# Patient Record
Sex: FEMALE | Race: WHITE | NOT HISPANIC OR LATINO | Employment: OTHER | URBAN - METROPOLITAN AREA
[De-identification: names, ages, dates, MRNs, and addresses within clinical notes are randomized per-mention and may not be internally consistent; named-entity substitution may affect disease eponyms.]

---

## 2018-01-13 NOTE — RESULT NOTES
Message   Recorded as Task   Date: 01/18/2016 09:42 AM, Created By: Major Guzman   Task Name: Follow Up   Assigned To: [facility] procedure,Team   Regarding Patient: Vincent Michelle, Status:  In Progress   Comment:    Lakia Holder - 18 Jan 2016 9:42 AM     TASK CREATED  S/p LESI on 1/11/16 w/Dr Irma Patel in [place]  No f/u scheduled   Lakia Holder - 20 Jan 2016 1:07 PM     TASK EDITED  S/w pt for f/u after injection  -pain is mostly gone, very happy with results  -she did mention that her blood pressure was up a little the day of & day after, also she had some swelling in her ankles, but she states that her  also had his ankles swelling?? So, she doesn't think it is related  -no f/u scheduled but will call if pain returns or if she has questions   Carlos Mckeon - 20 Jan 2016 1:44 PM     TASK REPLIED TO: Previously Assigned To Carlos Mckeon  [place]

## 2018-01-17 NOTE — RESULT NOTES
Message   Recorded as Task   Date: 01/18/2016 09:42 AM, Created By: Luis Pittman   Task Name: Follow Up   Assigned To: [facility] procedure,Team   Regarding Patient: Richmond Gregory, Status:  In Progress   Comment:    Lakia Holder - 18 Jan 2016 9:42 AM     TASK CREATED  S/p LESI on 1/11/16 w/Dr Patricio Rodriguez in [facility]  No f/u scheduled   Lakia Holder - 20 Jan 2016 1:07 PM     TASK EDITED  S/w pt for f/u after injection  -pain is mostly gone, very happy with results  -she did mention that her blood pressure was up a little the day of & day after, also she had some swelling in her ankles, but she states that her  also had his ankles swelling?? So, she doesn't think it is related  -no f/u scheduled but will call if pain returns or if she has questions   Carlos Mckeon - 20 Jan 2016 1:44 PM     TASK REPLIED TO: Previously Assigned To Carlos Mckeon  [place]

## 2019-02-15 ENCOUNTER — TELEPHONE (OUTPATIENT)
Dept: PAIN MEDICINE | Facility: CLINIC | Age: 84
End: 2019-02-15

## 2019-02-15 NOTE — TELEPHONE ENCOUNTER
S/w pt states she is a pt of [facility] and rec'd injections in the past; has herniated discs and is in pain  Pt was last seen 1/2016 and requests to seen [facility]  Pt has not seen any pm doctors other than [facility] in 2016

## 2019-02-26 ENCOUNTER — OFFICE VISIT (OUTPATIENT)
Dept: PAIN MEDICINE | Facility: CLINIC | Age: 84
End: 2019-02-26
Payer: MEDICARE

## 2019-02-26 VITALS
WEIGHT: 169 LBS | BODY MASS INDEX: 28.16 KG/M2 | HEIGHT: 65 IN | HEART RATE: 76 BPM | DIASTOLIC BLOOD PRESSURE: 70 MMHG | SYSTOLIC BLOOD PRESSURE: 140 MMHG

## 2019-02-26 DIAGNOSIS — M48.062 SPINAL STENOSIS OF LUMBAR REGION WITH NEUROGENIC CLAUDICATION: Primary | ICD-10-CM

## 2019-02-26 DIAGNOSIS — M54.16 LUMBAR RADICULOPATHY: ICD-10-CM

## 2019-02-26 PROCEDURE — 99204 OFFICE O/P NEW MOD 45 MIN: CPT | Performed by: ANESTHESIOLOGY

## 2019-02-26 RX ORDER — IBUPROFEN 200 MG
TABLET ORAL
COMMUNITY
End: 2019-09-25 | Stop reason: HOSPADM

## 2019-02-26 RX ORDER — SIMVASTATIN 10 MG
10 TABLET ORAL
COMMUNITY

## 2019-02-26 RX ORDER — AMITRIPTYLINE HYDROCHLORIDE 25 MG/1
25 TABLET, FILM COATED ORAL
COMMUNITY

## 2019-02-26 RX ORDER — AMLODIPINE BESYLATE 2.5 MG/1
2.5 TABLET ORAL DAILY
Refills: 3 | COMMUNITY
Start: 2019-02-19

## 2019-02-26 NOTE — PROGRESS NOTES
Assessment:  1  Spinal stenosis of lumbar region with neurogenic claudication    2  Lumbar radiculopathy        Plan:  The patient's pain persists despite time, relative rest, activity modification and therapy  I believe that she would benefit from a lumbar epidural steroid injection to diminish any inflammatory component of her pain  I will initially use an translaminar approach  If the patient does not receive significant pain relief following the initial injection, I may need to repeat using a transforaminal approach that may allow for better concentrate of the steroid along the affected nerve root  In the office today, we reviewed the nature of the patient's pathology in depth using  diagrams and models  I discussed the approach I would use for the epidural steroid injection and provided literature for home review  The patient understands the risks associated with the procedure including but not limited to bleeding, infection, tissue injury, exacerbation of symptoms, allergic reaction, spinal headache, and paralysis and provided written and verbal consent  today  My impressions and treatment recommendations were discussed in detail with the patient who verbalized understanding and had no further questions  Discharge instructions were provided  I personally saw and examined the patient and I agree with the above discussed plan of care  Orders Placed This Encounter   Procedures    [place] spine and pain procedure     Standing Status:   Future     Standing Expiration Date:   2/26/2023     Order Specific Question:   Reason for Exam:     Answer:   LESI #1     Order Specific Question:   Anticoagulant hold needed? Answer:   no    [place] spine and pain procedure     Standing Status:   Future     Standing Expiration Date:   2/26/2023     Order Specific Question:   Reason for Exam:     Answer:   LESI #2     Order Specific Question:   Anticoagulant hold needed?      Answer:   no     New Medications Ordered This Visit   Medications    ibuprofen (ADVIL) 200 mg tablet     Sig: Take by mouth    amLODIPine (NORVASC) 2 5 mg tablet     Refill:  3    simvastatin (ZOCOR) 10 mg tablet     Sig: Take 10 mg by mouth daily at bedtime    amitriptyline (ELAVIL) 25 mg tablet     Sig: Take 25 mg by mouth daily at bedtime       History of Present Illness:    Justin Bernabe is a 80 y o  female muscle over three years ago  At that point she underwent a lumbar epidural steroid injection with significant relief her pain returned approximately six months ago which described as moderate rates as 5/10 on the visual analog scale but significant interferes with daily living activities  Her pain is nearly constant worse in the morning describes dull achy noting that standing and walking aggravate her symptoms rest relaxation relieves them  She has undergone chiropractic treatment utilize heat nice with moderate relief  I have personally reviewed and/or updated the patient's past medical history, past surgical history, family history, social history, current medications, allergies, and vital signs today  Review of Systems:    Review of Systems   Constitutional: Negative for fever and unexpected weight change  HENT: Negative for trouble swallowing  Eyes: Negative for visual disturbance  Respiratory: Negative for shortness of breath and wheezing  Cardiovascular: Positive for palpitations  Negative for chest pain  Gastrointestinal: Positive for constipation  Negative for diarrhea, nausea and vomiting  Endocrine: Negative for cold intolerance, heat intolerance and polydipsia  Genitourinary: Negative for difficulty urinating and frequency  Musculoskeletal: Positive for gait problem (difficulty walking, Decreased ROM) and joint swelling (Joint stiffness)  Negative for arthralgias and myalgias  Skin: Positive for rash  Neurological: Positive for dizziness and weakness  Negative for seizures, syncope and headaches  Hematological: Bruises/bleeds easily  Psychiatric/Behavioral: Negative for dysphoric mood  All other systems reviewed and are negative  Patient Active Problem List   Diagnosis    Chronic low back pain    Chronic lumbar radiculopathy    Idiopathic peripheral neuropathy    Lumbar herniated disc    PTTD (posterior tibial tendon dysfunction)    Spinal stenosis of lumbar region with neurogenic claudication       Past Medical History:   Diagnosis Date    Asthma     Atrial fibrillation (HCC)     Fibromyalgia     GERD (gastroesophageal reflux disease)     H/O emphysema     Hypercholesterolemia        Past Surgical History:   Procedure Laterality Date    CHOLECYSTECTOMY      HYSTERECTOMY         Family History   Problem Relation Age of Onset    Neuropathy Other        Social History     Occupational History    Not on file   Tobacco Use    Smoking status: Never Smoker    Smokeless tobacco: Never Used   Substance and Sexual Activity    Alcohol use: Not on file    Drug use: Not on file    Sexual activity: Not on file     Physical Exam:    /70   Pulse 76   Ht 5' 5" (1 651 m)   Wt 76 7 kg (169 lb)   BMI 28 12 kg/m²     Constitutional: normal, well developed, well nourished, alert, in no distress and non-toxic and no overt pain behavior    Eyes: anicteric  HEENT: grossly intact  Neck: supple, symmetric, trachea midline and no masses   Pulmonary:even and unlabored  Cardiovascular:No edema or pitting edema present  Skin:Normal without rashes or lesions and well hydrated  Psychiatric:Mood and affect appropriate  Neurologic:Cranial Nerves II-XII grossly intact  Musculoskeletal:normal, difficulty going from sitting to standing sitting position no obvious skin lesions or erythema lumbar sacral spine no tenderness to palpation lumbar sacral spine spinous process sacroiliac joint or greater trochanter bilateral, deep tendon reflexes diminished but symmetrical bilateral patella Achilles no focal motor deficit appreciated lower limbs no sensory deficit appreciated lower limbs    Imaging  CT LUMBAR SP W/O IV CONTRAST Date of Service: 01/23/14   Category: Cat Scan     REPORT STATUS: Signed       CT lumbar spine     History: Sciatica     Multi-helical imaging in the axial plane with coronal and sagittal reformations  Compared with   April 17, 2008 plain film examination  There is chronically stable mild superior endplate compression deformity of T12  There are no destructive bony lesions  L1-L2 reveals minimal disc bulge and is otherwise unremarkable  No canal stenosis  No focal disc   herniation  L2-L3 reveals moderate disc bulge with suggested small superimposed broad-based right lateral   herniation with no significant mass effect  There is mild facet and ligamentum hypertrophy  Overall   canal dimensions are preserved without canal stenosis  L3-L4 reveals mild disc bulge with suggested small broad-based right lateral herniation without   significant mass effect  No canal stenosis  L4-L5 reveals mild disc bulge facet and ligamentum hypertrophy with mild central canal stenosis  There is suggested small right lateral broad-based herniation without significant mass effect  L5-S1 reveals no disc bulge or herniation  No canal stenosis  Impression:   1  Small broad-based right lateral disc herniations are suggested at L2-L3 and L3-L4 and L4-L5   without significant mass effect  2  No canal stenosis  No significant foraminal stenosis  3  Chronic stable mild T12 compression  I have personally reviewed pertinent films in PACS

## 2019-02-28 ENCOUNTER — HOSPITAL ENCOUNTER (OUTPATIENT)
Dept: RADIOLOGY | Facility: CLINIC | Age: 84
Discharge: HOME | End: 2019-02-28
Attending: ANESTHESIOLOGY | Admitting: ANESTHESIOLOGY
Payer: MEDICARE

## 2019-02-28 VITALS
DIASTOLIC BLOOD PRESSURE: 63 MMHG | RESPIRATION RATE: 18 BRPM | HEART RATE: 60 BPM | SYSTOLIC BLOOD PRESSURE: 175 MMHG | TEMPERATURE: 98.5 F | OXYGEN SATURATION: 96 %

## 2019-02-28 DIAGNOSIS — M48.062 SPINAL STENOSIS OF LUMBAR REGION WITH NEUROGENIC CLAUDICATION: ICD-10-CM

## 2019-02-28 DIAGNOSIS — M54.16 LUMBAR RADICULOPATHY: ICD-10-CM

## 2019-02-28 PROCEDURE — 62323 NJX INTERLAMINAR LMBR/SAC: CPT | Performed by: ANESTHESIOLOGY

## 2019-02-28 RX ORDER — METHYLPREDNISOLONE ACETATE 80 MG/ML
160 INJECTION, SUSPENSION INTRA-ARTICULAR; INTRALESIONAL; INTRAMUSCULAR; PARENTERAL; SOFT TISSUE ONCE
Status: COMPLETED | OUTPATIENT
Start: 2019-02-28 | End: 2019-02-28

## 2019-02-28 RX ORDER — LIDOCAINE HYDROCHLORIDE 10 MG/ML
5 INJECTION, SOLUTION EPIDURAL; INFILTRATION; INTRACAUDAL; PERINEURAL ONCE
Status: COMPLETED | OUTPATIENT
Start: 2019-02-28 | End: 2019-02-28

## 2019-02-28 RX ADMIN — LIDOCAINE HYDROCHLORIDE 3 ML: 10 INJECTION, SOLUTION EPIDURAL; INFILTRATION; INTRACAUDAL; PERINEURAL at 10:20

## 2019-02-28 RX ADMIN — METHYLPREDNISOLONE ACETATE 160 MG: 80 INJECTION, SUSPENSION INTRA-ARTICULAR; INTRALESIONAL; INTRAMUSCULAR; SOFT TISSUE at 10:20

## 2019-02-28 RX ADMIN — IOHEXOL 1 ML: 300 INJECTION, SOLUTION INTRAVENOUS at 10:20

## 2019-02-28 NOTE — DISCHARGE INSTRUCTIONS
Epidural Steroid Injection   WHAT YOU NEED TO KNOW:   An epidural steroid injection (MELANI) is a procedure to inject steroid medicine into the epidural space  The epidural space is between your spinal cord and vertebrae  Steroids reduce inflammation and fluid buildup in your spine that may be causing pain  You may be given pain medicine along with the steroids  ACTIVITY  · Do not drive or operate machinery today  · No strenuous activity today - bending, lifting, etc   · You may resume normal activites starting tomorrow - start slowly and as tolerated  · You may shower today, but no tub baths or hot tubs  · You may have numbness for several hours from the local anesthetic  Please use caution and common sense, especially with weight-bearing activities  CARE OF THE INJECTION SITE  · If you have soreness or pain, apply ice to the area today (20 minutes on/20 minutes off)  · Starting tomorrow, you may use warm, moist heat or ice if needed  · You may have an increase or change in your discomfort for 36-48 hours after your treatment  · Apply ice and continue with any pain medication you have been prescribed  · Notify [facility] if you have any of the following: redness, drainage, swelling, headache, stiff neck or fever above 100°F     SPECIAL INSTRUCTIONS  · Our office will contact you in approximately 7 days for a progress report  MEDICATIONS  · Continue to take all routine medications  · Our office may have instructed you to hold some medications  · Ok to resume ibuprofen    If you have a problem specifically related to your procedure, please call our office at [phone]  Problems not related to your procedure should be directed to your primary care physician

## 2019-02-28 NOTE — H&P
History of Present Illness: The patient is a 80 y o  female who presents with complaints of low back and lower extremity pain  Patient Active Problem List   Diagnosis    Chronic low back pain    Chronic lumbar radiculopathy    Idiopathic peripheral neuropathy    Lumbar herniated disc    PTTD (posterior tibial tendon dysfunction)    Spinal stenosis of lumbar region with neurogenic claudication       Past Medical History:   Diagnosis Date    Asthma     Atrial fibrillation (HCC)     Fibromyalgia     GERD (gastroesophageal reflux disease)     H/O emphysema     Hypercholesterolemia        Past Surgical History:   Procedure Laterality Date    CHOLECYSTECTOMY      HYSTERECTOMY           Current Outpatient Medications:     amitriptyline (ELAVIL) 25 mg tablet, Take 25 mg by mouth daily at bedtime, Disp: , Rfl:     amLODIPine (NORVASC) 2 5 mg tablet, , Disp: , Rfl: 3    ibuprofen (ADVIL) 200 mg tablet, Take by mouth, Disp: , Rfl:     simvastatin (ZOCOR) 10 mg tablet, Take 10 mg by mouth daily at bedtime, Disp: , Rfl:     Current Facility-Administered Medications:     iohexol (OMNIPAQUE) 300 mg/mL injection 50 mL, 50 mL, Epidural, Once, Carlos Mckeon DO    lidocaine (PF) (XYLOCAINE-MPF) 1 % injection 5 mL, 5 mL, Other, Once, Carlos Mckeon DO    methylPREDNISolone acetate (DEPO-MEDROL) injection 160 mg, 160 mg, Epidural, Once, Darol Medin, DO    Allergies   Allergen Reactions    Ciprofloxacin     Penicillins      Other reaction(s): Itching       Physical Exam:   Vitals:    02/28/19 1011   BP: 163/83   Pulse: 63   Resp: 18   Temp: 98 5 °F (36 9 °C)   SpO2: 95%     General: Awake, Alert, Oriented x 3, Mood and affect appropriate  Respiratory: Respirations even and unlabored  Cardiovascular: Peripheral pulses intact; no edema  Musculoskeletal Exam:  Decreased range of motion lumbar spine    ASA Score: II    Patient/Chart Verification  Patient ID Verified: Verbal  ID Band Applied: No  Consents Confirmed:  To be obtained in the Pre-Procedure area  H&P( within 30 days) Verified: To be obtained in the Pre-Procedure area  Interval H&P(within 24 hr) Complete (required for Outpatients and Surgery Admit only): To be obtained in the Pre-Procedure area  Allergies Reviewed: Yes  Anticoag/NSAID held?: Yes  Currently on antibiotics?: No  Pregnancy denied?: NA  Beta Blocker given : N/A    Assessment:   1  Spinal stenosis of lumbar region with neurogenic claudication    2   Lumbar radiculopathy        Plan: PENELOPE #1

## 2019-03-07 ENCOUNTER — TELEPHONE (OUTPATIENT)
Dept: PAIN MEDICINE | Facility: CLINIC | Age: 84
End: 2019-03-07

## 2019-03-14 ENCOUNTER — TELEPHONE (OUTPATIENT)
Dept: PAIN MEDICINE | Facility: MEDICAL CENTER | Age: 84
End: 2019-03-14

## 2019-09-19 ENCOUNTER — APPOINTMENT (OUTPATIENT)
Dept: RADIOLOGY | Facility: HOSPITAL | Age: 84
DRG: 023 | End: 2019-09-19
Payer: MEDICARE

## 2019-09-19 ENCOUNTER — APPOINTMENT (INPATIENT)
Dept: NON INVASIVE DIAGNOSTICS | Facility: HOSPITAL | Age: 84
DRG: 023 | End: 2019-09-19
Payer: MEDICARE

## 2019-09-19 ENCOUNTER — HOSPITAL ENCOUNTER (OUTPATIENT)
Dept: RADIOLOGY | Facility: HOSPITAL | Age: 84
Discharge: HOME | End: 2019-09-19

## 2019-09-19 ENCOUNTER — DOCUMENTATION (OUTPATIENT)
Dept: OTHER | Facility: HOSPITAL | Age: 84
End: 2019-09-19

## 2019-09-19 ENCOUNTER — ANESTHESIA EVENT (OUTPATIENT)
Dept: RADIOLOGY | Facility: HOSPITAL | Age: 84
DRG: 023 | End: 2019-09-19
Payer: MEDICARE

## 2019-09-19 ENCOUNTER — ANESTHESIA (OUTPATIENT)
Dept: RADIOLOGY | Facility: HOSPITAL | Age: 84
DRG: 023 | End: 2019-09-19
Payer: MEDICARE

## 2019-09-19 ENCOUNTER — HOSPITAL ENCOUNTER (INPATIENT)
Dept: RADIOLOGY | Facility: HOSPITAL | Age: 84
LOS: 6 days | DRG: 023 | End: 2019-09-25
Attending: RADIOLOGY | Admitting: INTERNAL MEDICINE
Payer: MEDICARE

## 2019-09-19 DIAGNOSIS — K59.00 CONSTIPATION: ICD-10-CM

## 2019-09-19 DIAGNOSIS — I48.91 ATRIAL FIBRILLATION (HCC): Chronic | ICD-10-CM

## 2019-09-19 DIAGNOSIS — B37.9 CANDIDIASIS: ICD-10-CM

## 2019-09-19 DIAGNOSIS — M54.16 CHRONIC LUMBAR RADICULOPATHY: ICD-10-CM

## 2019-09-19 DIAGNOSIS — I63.429 CEREBROVASCULAR ACCIDENT (CVA) DUE TO EMBOLISM OF ANTERIOR CEREBRAL ARTERY, UNSPECIFIED BLOOD VESSEL LATERALITY (HCC): ICD-10-CM

## 2019-09-19 DIAGNOSIS — I48.91 ATRIAL FIBRILLATION WITH RVR (HCC): Primary | ICD-10-CM

## 2019-09-19 DIAGNOSIS — I63.531 ACUTE RIGHT PCA STROKE (HCC): ICD-10-CM

## 2019-09-19 LAB
ANION GAP SERPL CALCULATED.3IONS-SCNC: 6 MMOL/L (ref 4–13)
BASE EXCESS BLDA CALC-SCNC: -8 MMOL/L (ref -2–3)
BASOPHILS # BLD AUTO: 0.05 THOUSANDS/ΜL (ref 0–0.1)
BASOPHILS NFR BLD AUTO: 0 % (ref 0–1)
BUN SERPL-MCNC: 17 MG/DL (ref 5–25)
CA-I BLD-SCNC: 1.01 MMOL/L (ref 1.12–1.32)
CA-I BLD-SCNC: 1.06 MMOL/L (ref 1.12–1.32)
CALCIUM SERPL-MCNC: 7.1 MG/DL (ref 8.3–10.1)
CHLORIDE SERPL-SCNC: 114 MMOL/L (ref 100–108)
CO2 SERPL-SCNC: 19 MMOL/L (ref 21–32)
CREAT SERPL-MCNC: 0.61 MG/DL (ref 0.6–1.3)
EOSINOPHIL # BLD AUTO: 0 THOUSAND/ΜL (ref 0–0.61)
EOSINOPHIL NFR BLD AUTO: 0 % (ref 0–6)
ERYTHROCYTE [DISTWIDTH] IN BLOOD BY AUTOMATED COUNT: 14.5 % (ref 11.6–15.1)
GFR SERPL CREATININE-BSD FRML MDRD: 83 ML/MIN/1.73SQ M
GLUCOSE SERPL-MCNC: 124 MG/DL (ref 65–140)
GLUCOSE SERPL-MCNC: 136 MG/DL (ref 65–140)
HCO3 BLDA-SCNC: 18.2 MMOL/L (ref 22–28)
HCT VFR BLD AUTO: 36 % (ref 34.8–46.1)
HCT VFR BLD CALC: 32 % (ref 34.8–46.1)
HGB BLD-MCNC: 11.4 G/DL (ref 11.5–15.4)
HGB BLDA-MCNC: 10.9 G/DL (ref 11.5–15.4)
IMM GRANULOCYTES # BLD AUTO: 0.07 THOUSAND/UL (ref 0–0.2)
IMM GRANULOCYTES NFR BLD AUTO: 1 % (ref 0–2)
LYMPHOCYTES # BLD AUTO: 1.22 THOUSANDS/ΜL (ref 0.6–4.47)
LYMPHOCYTES NFR BLD AUTO: 9 % (ref 14–44)
MAGNESIUM SERPL-MCNC: 1.8 MG/DL (ref 1.6–2.6)
MCH RBC QN AUTO: 27.4 PG (ref 26.8–34.3)
MCHC RBC AUTO-ENTMCNC: 31.7 G/DL (ref 31.4–37.4)
MCV RBC AUTO: 87 FL (ref 82–98)
MONOCYTES # BLD AUTO: 1.01 THOUSAND/ΜL (ref 0.17–1.22)
MONOCYTES NFR BLD AUTO: 8 % (ref 4–12)
NEUTROPHILS # BLD AUTO: 11.07 THOUSANDS/ΜL (ref 1.85–7.62)
NEUTS SEG NFR BLD AUTO: 82 % (ref 43–75)
NRBC BLD AUTO-RTO: 0 /100 WBCS
PCO2 BLD: 19 MMOL/L (ref 21–32)
PCO2 BLD: 37.8 MM HG (ref 36–44)
PH BLD: 7.29 [PH] (ref 7.35–7.45)
PHOSPHATE SERPL-MCNC: 2.6 MG/DL (ref 2.3–4.1)
PLATELET # BLD AUTO: 340 THOUSANDS/UL (ref 149–390)
PMV BLD AUTO: 10.6 FL (ref 8.9–12.7)
PO2 BLD: 366 MM HG (ref 75–129)
POTASSIUM BLD-SCNC: 3.5 MMOL/L (ref 3.5–5.3)
POTASSIUM SERPL-SCNC: 3.4 MMOL/L (ref 3.5–5.3)
RBC # BLD AUTO: 4.16 MILLION/UL (ref 3.81–5.12)
SAO2 % BLD FROM PO2: 100 % (ref 95–98)
SODIUM BLD-SCNC: 140 MMOL/L (ref 136–145)
SODIUM SERPL-SCNC: 139 MMOL/L (ref 136–145)
SPECIMEN SOURCE: ABNORMAL
WBC # BLD AUTO: 13.42 THOUSAND/UL (ref 4.31–10.16)

## 2019-09-19 PROCEDURE — 03CG3Z7 EXTIRPATION OF MATTER FROM INTRACRANIAL ARTERY USING STENT RETRIEVER, PERCUTANEOUS APPROACH: ICD-10-PCS | Performed by: RADIOLOGY

## 2019-09-19 PROCEDURE — C1769 GUIDE WIRE: HCPCS

## 2019-09-19 PROCEDURE — C1894 INTRO/SHEATH, NON-LASER: HCPCS

## 2019-09-19 PROCEDURE — 94760 N-INVAS EAR/PLS OXIMETRY 1: CPT

## 2019-09-19 PROCEDURE — 85014 HEMATOCRIT: CPT

## 2019-09-19 PROCEDURE — 0042T HB CT PERFUSION W/CONTRAST CBF: CPT

## 2019-09-19 PROCEDURE — 85025 COMPLETE CBC W/AUTO DIFF WBC: CPT | Performed by: FAMILY MEDICINE

## 2019-09-19 PROCEDURE — 99223 1ST HOSP IP/OBS HIGH 75: CPT | Performed by: INTERNAL MEDICINE

## 2019-09-19 PROCEDURE — 93306 TTE W/DOPPLER COMPLETE: CPT | Performed by: INTERNAL MEDICINE

## 2019-09-19 PROCEDURE — 82330 ASSAY OF CALCIUM: CPT

## 2019-09-19 PROCEDURE — 5A1935Z RESPIRATORY VENTILATION, LESS THAN 24 CONSECUTIVE HOURS: ICD-10-PCS | Performed by: INTERNAL MEDICINE

## 2019-09-19 PROCEDURE — 80048 BASIC METABOLIC PNL TOTAL CA: CPT | Performed by: FAMILY MEDICINE

## 2019-09-19 PROCEDURE — 82947 ASSAY GLUCOSE BLOOD QUANT: CPT

## 2019-09-19 PROCEDURE — 83735 ASSAY OF MAGNESIUM: CPT | Performed by: FAMILY MEDICINE

## 2019-09-19 PROCEDURE — 61645 PERQ ART M-THROMBECT &/NFS: CPT

## 2019-09-19 PROCEDURE — 99223 1ST HOSP IP/OBS HIGH 75: CPT | Performed by: RADIOLOGY

## 2019-09-19 PROCEDURE — 93005 ELECTROCARDIOGRAM TRACING: CPT

## 2019-09-19 PROCEDURE — 61645 PERQ ART M-THROMBECT &/NFS: CPT | Performed by: RADIOLOGY

## 2019-09-19 PROCEDURE — C1757 CATH, THROMBECTOMY/EMBOLECT: HCPCS

## 2019-09-19 PROCEDURE — 94002 VENT MGMT INPAT INIT DAY: CPT

## 2019-09-19 PROCEDURE — 84132 ASSAY OF SERUM POTASSIUM: CPT

## 2019-09-19 PROCEDURE — C1760 CLOSURE DEV, VASC: HCPCS

## 2019-09-19 PROCEDURE — 82803 BLOOD GASES ANY COMBINATION: CPT

## 2019-09-19 PROCEDURE — 99223 1ST HOSP IP/OBS HIGH 75: CPT | Performed by: PSYCHIATRY & NEUROLOGY

## 2019-09-19 PROCEDURE — 99024 POSTOP FOLLOW-UP VISIT: CPT | Performed by: RADIOLOGY

## 2019-09-19 PROCEDURE — 84295 ASSAY OF SERUM SODIUM: CPT

## 2019-09-19 PROCEDURE — 1123F ACP DISCUSS/DSCN MKR DOCD: CPT | Performed by: INTERNAL MEDICINE

## 2019-09-19 PROCEDURE — 93306 TTE W/DOPPLER COMPLETE: CPT

## 2019-09-19 PROCEDURE — C1887 CATHETER, GUIDING: HCPCS

## 2019-09-19 PROCEDURE — 82330 ASSAY OF CALCIUM: CPT | Performed by: FAMILY MEDICINE

## 2019-09-19 PROCEDURE — 70450 CT HEAD/BRAIN W/O DYE: CPT

## 2019-09-19 PROCEDURE — NC001 PR NO CHARGE: Performed by: RADIOLOGY

## 2019-09-19 PROCEDURE — 99223 1ST HOSP IP/OBS HIGH 75: CPT | Performed by: PHYSICAL MEDICINE & REHABILITATION

## 2019-09-19 PROCEDURE — 84100 ASSAY OF PHOSPHORUS: CPT | Performed by: FAMILY MEDICINE

## 2019-09-19 RX ORDER — ATORVASTATIN CALCIUM 40 MG/1
40 TABLET, FILM COATED ORAL DAILY
COMMUNITY
End: 2019-09-25 | Stop reason: HOSPADM

## 2019-09-19 RX ORDER — LEVOFLOXACIN 750 MG/1
750 TABLET ORAL EVERY 24 HOURS
COMMUNITY
End: 2019-09-25 | Stop reason: HOSPADM

## 2019-09-19 RX ORDER — CHLORHEXIDINE GLUCONATE 0.12 MG/ML
15 RINSE ORAL EVERY 12 HOURS SCHEDULED
Status: DISCONTINUED | OUTPATIENT
Start: 2019-09-19 | End: 2019-09-19

## 2019-09-19 RX ORDER — ALBUTEROL SULFATE 2.5 MG/3ML
2.5 SOLUTION RESPIRATORY (INHALATION) EVERY 6 HOURS PRN
Status: DISCONTINUED | OUTPATIENT
Start: 2019-09-19 | End: 2019-09-25 | Stop reason: HOSPADM

## 2019-09-19 RX ORDER — PROPOFOL 10 MG/ML
INJECTION, EMULSION INTRAVENOUS CONTINUOUS PRN
Status: DISCONTINUED | OUTPATIENT
Start: 2019-09-19 | End: 2019-09-19 | Stop reason: SURG

## 2019-09-19 RX ORDER — HEPARIN SODIUM 1000 [USP'U]/ML
INJECTION, SOLUTION INTRAVENOUS; SUBCUTANEOUS CODE/TRAUMA/SEDATION MEDICATION
Status: DISCONTINUED | OUTPATIENT
Start: 2019-09-19 | End: 2019-09-25 | Stop reason: HOSPADM

## 2019-09-19 RX ORDER — SUCCINYLCHOLINE/SOD CL,ISO/PF 100 MG/5ML
SYRINGE (ML) INTRAVENOUS AS NEEDED
Status: DISCONTINUED | OUTPATIENT
Start: 2019-09-19 | End: 2019-09-19 | Stop reason: SURG

## 2019-09-19 RX ORDER — SODIUM CHLORIDE 9 MG/ML
INJECTION, SOLUTION INTRAVENOUS CONTINUOUS PRN
Status: DISCONTINUED | OUTPATIENT
Start: 2019-09-19 | End: 2019-09-19 | Stop reason: SURG

## 2019-09-19 RX ORDER — METOPROLOL TARTRATE 5 MG/5ML
5 INJECTION INTRAVENOUS ONCE
Status: COMPLETED | OUTPATIENT
Start: 2019-09-19 | End: 2019-09-19

## 2019-09-19 RX ORDER — NITROGLYCERIN 20 MG/100ML
INJECTION INTRAVENOUS CODE/TRAUMA/SEDATION MEDICATION
Status: DISCONTINUED | OUTPATIENT
Start: 2019-09-19 | End: 2019-09-25 | Stop reason: HOSPADM

## 2019-09-19 RX ORDER — ATORVASTATIN CALCIUM 40 MG/1
40 TABLET, FILM COATED ORAL EVERY EVENING
Status: DISCONTINUED | OUTPATIENT
Start: 2019-09-19 | End: 2019-09-25 | Stop reason: HOSPADM

## 2019-09-19 RX ORDER — PROPOFOL 10 MG/ML
5-50 INJECTION, EMULSION INTRAVENOUS
Status: DISCONTINUED | OUTPATIENT
Start: 2019-09-19 | End: 2019-09-19

## 2019-09-19 RX ORDER — AMLODIPINE BESYLATE 2.5 MG/1
2.5 TABLET ORAL DAILY
Status: DISCONTINUED | OUTPATIENT
Start: 2019-09-20 | End: 2019-09-25 | Stop reason: HOSPADM

## 2019-09-19 RX ORDER — ATORVASTATIN CALCIUM 40 MG/1
40 TABLET, FILM COATED ORAL EVERY EVENING
Status: DISCONTINUED | OUTPATIENT
Start: 2019-09-19 | End: 2019-09-19

## 2019-09-19 RX ORDER — ACETAMINOPHEN AND CODEINE PHOSPHATE 300; 30 MG/1; MG/1
1 TABLET ORAL EVERY 6 HOURS PRN
COMMUNITY
End: 2019-09-25 | Stop reason: HOSPADM

## 2019-09-19 RX ORDER — ASPIRIN 325 MG
325 TABLET ORAL DAILY
COMMUNITY
End: 2019-09-25 | Stop reason: HOSPADM

## 2019-09-19 RX ORDER — ACETAMINOPHEN 325 MG/1
650 TABLET ORAL EVERY 6 HOURS PRN
Status: DISCONTINUED | OUTPATIENT
Start: 2019-09-19 | End: 2019-09-25 | Stop reason: HOSPADM

## 2019-09-19 RX ORDER — CEPHALEXIN 500 MG/1
500 CAPSULE ORAL 3 TIMES DAILY
COMMUNITY
End: 2019-09-25 | Stop reason: HOSPADM

## 2019-09-19 RX ORDER — SODIUM CHLORIDE 9 MG/ML
50 INJECTION, SOLUTION INTRAVENOUS CONTINUOUS
Status: DISCONTINUED | OUTPATIENT
Start: 2019-09-19 | End: 2019-09-19

## 2019-09-19 RX ORDER — CLOPIDOGREL BISULFATE 75 MG/1
75 TABLET ORAL DAILY
COMMUNITY
End: 2019-09-25 | Stop reason: HOSPADM

## 2019-09-19 RX ORDER — METOPROLOL SUCCINATE 25 MG/1
25 TABLET, EXTENDED RELEASE ORAL DAILY
COMMUNITY
End: 2019-09-25 | Stop reason: HOSPADM

## 2019-09-19 RX ORDER — PAROXETINE 10 MG/1
10 TABLET, FILM COATED ORAL DAILY
COMMUNITY

## 2019-09-19 RX ORDER — PROPOFOL 10 MG/ML
INJECTION, EMULSION INTRAVENOUS AS NEEDED
Status: DISCONTINUED | OUTPATIENT
Start: 2019-09-19 | End: 2019-09-19 | Stop reason: SURG

## 2019-09-19 RX ORDER — VERAPAMIL HYDROCHLORIDE 2.5 MG/ML
INJECTION, SOLUTION INTRAVENOUS CODE/TRAUMA/SEDATION MEDICATION
Status: DISCONTINUED | OUTPATIENT
Start: 2019-09-19 | End: 2019-09-25 | Stop reason: HOSPADM

## 2019-09-19 RX ORDER — ASPIRIN 81 MG/1
81 TABLET, CHEWABLE ORAL DAILY
Status: DISCONTINUED | OUTPATIENT
Start: 2019-09-19 | End: 2019-09-23

## 2019-09-19 RX ORDER — HEPARIN SODIUM 5000 [USP'U]/ML
5000 INJECTION, SOLUTION INTRAVENOUS; SUBCUTANEOUS EVERY 8 HOURS SCHEDULED
Status: DISCONTINUED | OUTPATIENT
Start: 2019-09-19 | End: 2019-09-20

## 2019-09-19 RX ORDER — DILTIAZEM HYDROCHLORIDE 5 MG/ML
10 INJECTION INTRAVENOUS ONCE
Status: COMPLETED | OUTPATIENT
Start: 2019-09-19 | End: 2019-09-19

## 2019-09-19 RX ORDER — SODIUM CHLORIDE 9 MG/ML
50 INJECTION, SOLUTION INTRAVENOUS CONTINUOUS
Status: DISCONTINUED | OUTPATIENT
Start: 2019-09-19 | End: 2019-09-20

## 2019-09-19 RX ORDER — PAROXETINE HYDROCHLORIDE 20 MG/1
10 TABLET, FILM COATED ORAL DAILY
Status: DISCONTINUED | OUTPATIENT
Start: 2019-09-19 | End: 2019-09-25 | Stop reason: HOSPADM

## 2019-09-19 RX ORDER — METOPROLOL SUCCINATE 25 MG/1
25 TABLET, EXTENDED RELEASE ORAL DAILY
Status: DISCONTINUED | OUTPATIENT
Start: 2019-09-19 | End: 2019-09-19

## 2019-09-19 RX ADMIN — PHENYLEPHRINE HYDROCHLORIDE 200 MCG: 10 INJECTION INTRAVENOUS at 07:23

## 2019-09-19 RX ADMIN — PROPOFOL 200 MG: 10 INJECTION, EMULSION INTRAVENOUS at 07:23

## 2019-09-19 RX ADMIN — ATORVASTATIN CALCIUM 40 MG: 40 TABLET, FILM COATED ORAL at 17:47

## 2019-09-19 RX ADMIN — SODIUM CHLORIDE 5 MG/HR: 0.9 INJECTION, SOLUTION INTRAVENOUS at 09:26

## 2019-09-19 RX ADMIN — IOHEXOL 80 ML: 350 INJECTION, SOLUTION INTRAVENOUS at 07:10

## 2019-09-19 RX ADMIN — SODIUM CHLORIDE 50 ML/HR: 0.9 INJECTION, SOLUTION INTRAVENOUS at 22:50

## 2019-09-19 RX ADMIN — HEPARIN SODIUM 5000 UNITS: 5000 INJECTION INTRAVENOUS; SUBCUTANEOUS at 22:46

## 2019-09-19 RX ADMIN — NITROGLYCERIN 150 MCG: 20 INJECTION INTRAVENOUS at 07:47

## 2019-09-19 RX ADMIN — PAROXETINE 10 MG: 20 TABLET, FILM COATED ORAL at 17:46

## 2019-09-19 RX ADMIN — PROPOFOL 50 MCG/KG/MIN: 10 INJECTION, EMULSION INTRAVENOUS at 09:26

## 2019-09-19 RX ADMIN — HEPARIN SODIUM 5000 UNITS: 5000 INJECTION INTRAVENOUS; SUBCUTANEOUS at 14:57

## 2019-09-19 RX ADMIN — Medication 100 MG: at 07:23

## 2019-09-19 RX ADMIN — VERAPAMIL HYDROCHLORIDE 5 MG: 2.5 INJECTION INTRAVENOUS at 07:49

## 2019-09-19 RX ADMIN — METRONIDAZOLE 500 MG: 500 INJECTION, SOLUTION INTRAVENOUS at 12:56

## 2019-09-19 RX ADMIN — PROPOFOL 35 MCG/KG/MIN: 10 INJECTION, EMULSION INTRAVENOUS at 14:58

## 2019-09-19 RX ADMIN — ASPIRIN 81 MG 81 MG: 81 TABLET ORAL at 14:57

## 2019-09-19 RX ADMIN — CHLORHEXIDINE GLUCONATE 0.12% ORAL RINSE 15 ML: 1.2 LIQUID ORAL at 12:54

## 2019-09-19 RX ADMIN — CEFTRIAXONE 1000 MG: 1 INJECTION, POWDER, FOR SOLUTION INTRAMUSCULAR; INTRAVENOUS at 14:32

## 2019-09-19 RX ADMIN — HEPARIN SODIUM 2000 UNITS: 1000 INJECTION INTRAVENOUS; SUBCUTANEOUS at 07:49

## 2019-09-19 RX ADMIN — NOREPINEPHRINE BITARTRATE 2 MCG/MIN: 1 INJECTION INTRAVENOUS at 07:19

## 2019-09-19 RX ADMIN — NOREPINEPHRINE BITARTRATE 2 MCG/MIN: 1 INJECTION INTRAVENOUS at 07:50

## 2019-09-19 RX ADMIN — IODIXANOL 70 ML: 320 INJECTION, SOLUTION INTRAVASCULAR at 09:31

## 2019-09-19 RX ADMIN — DILTIAZEM HYDROCHLORIDE 10 MG: 5 INJECTION INTRAVENOUS at 17:47

## 2019-09-19 RX ADMIN — PHENYLEPHRINE HYDROCHLORIDE 50 MCG/MIN: 10 INJECTION INTRAVENOUS at 07:23

## 2019-09-19 RX ADMIN — METOPROLOL TARTRATE 5 MG: 5 INJECTION INTRAVENOUS at 16:58

## 2019-09-19 RX ADMIN — SODIUM CHLORIDE: 0.9 INJECTION, SOLUTION INTRAVENOUS at 07:19

## 2019-09-19 RX ADMIN — METRONIDAZOLE 500 MG: 500 INJECTION, SOLUTION INTRAVENOUS at 20:24

## 2019-09-19 RX ADMIN — SODIUM CHLORIDE 50 ML/HR: 0.9 INJECTION, SOLUTION INTRAVENOUS at 12:57

## 2019-09-19 RX ADMIN — METOPROLOL TARTRATE 25 MG: 25 TABLET, FILM COATED ORAL at 20:24

## 2019-09-19 NOTE — ANESTHESIA POSTPROCEDURE EVALUATION
Post-Op Assessment Note    CV Status:  Stable  Pain Score: 0    Pain management: adequate     Mental Status:  Unresponsive   Hydration Status:  Stable   PONV Controlled:  None   Airway Patency:  Patent  Airway: intubated    Staff: CRNA   Comments: Transported to CT for follow-up scan, ICU staff assumed care in CT          BP      Temp      Pulse     Resp      SpO2

## 2019-09-19 NOTE — PROGRESS NOTES
Pt had episode of tachycardia, stated "my heart is racing"   MD's here, EKG done, one dose metoprolol 5mg given

## 2019-09-19 NOTE — H&P
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 and AF off A/C two weeks ago due to hemoptysis who was found with L-sided hemiparesis and right gaze  Last known normal was 9:30 PM yesterday  The patient was transferred from [facility] for mechanical thrombectomy  During transfer patient aspirated 4x per transport  Patient has allergies to PCN and ciprofloxacin for which she gets a rash  History obtained from spouse and chart review  PMH:   Past Medical History:   Diagnosis Date    Asthma     Atrial fibrillation (HCC)     Fibromyalgia     GERD (gastroesophageal reflux disease)     H/O emphysema     Hypercholesterolemia        PSH:   Past Surgical History:   Procedure Laterality Date    CHOLECYSTECTOMY      HYSTERECTOMY         Family History:   Family History   Problem Relation Age of Onset    Neuropathy Other        Social History:   Social History     Tobacco Use   Smoking Status Never Smoker   Smokeless Tobacco Never Used      Social History     Substance and Sexual Activity   Alcohol Use Not on file      Marital Status: /Civil Union    ROS: 14 point ROS is unable to be obatined seconday to patient status  Patient intubated    Allergies: Allergies   Allergen Reactions    Ciprofloxacin     Penicillins      Other reaction(s): Itching       Home Medications:   Prior to Admission medications    Medication Sig Start Date End Date Taking?  Authorizing Provider   amitriptyline (ELAVIL) 25 mg tablet Take 25 mg by mouth daily at bedtime    Historical Provider, MD   amLODIPine (NORVASC) 2 5 mg tablet  2/19/19   Historical Provider, MD   ibuprofen (ADVIL) 200 mg tablet Take by mouth    Historical Provider, MD   simvastatin (ZOCOR) 10 mg tablet Take 10 mg by mouth daily at bedtime    Historical Provider, MD       Vitals:   Vitals:    19 0930 19 1055   SpO2: 100% 100%             Respiratory:  SpO2: SpO2: 100 %, SpO2 Activity:  , SpO2 Device:  , Capnography:         Temperature: No data recorded  Current:      Weights: There is no height or weight on file to calculate BMI  Physical Exam:    General Appearance:  Intubated and ill    HENT: Normocephalic and atraumatic    Neck: Right sided IJ CVC  Eyes: No scleral icterus  Cardiac: regular rate and regular rhythm, No murmur, No rub  Pulmonary: crackles heard in upper and lower lungs on left and lower right lung fields  bilious secretions  Gastrointestinal: soft, non-tender and no distention  : Shore present: yes   Musculoskeletal: No edema bilaterally  Neuro:  GCS 8t  Corneal, pupillary, and gag reflex intact    Psych: Mood and affect unable to assess    Skin: Right sided IV infiltrated and right forearm bruises  Labs:   Results from last 7 days   Lab Units 19  0812   I STAT HEMOGLOBIN g/dl 10 9*   HEMATOCRIT, ISTAT % 32*     Results from last 7 days   Lab Units 19  0812   CO2, I-STAT mmol/L 19*   GLUCOSE, ISTAT mg/dl 136        Imagin19 CXR: I have personally reviewed pertinent reports  and I have personally reviewed pertinent films in PACS       19 CT:  I have personally reviewed pertinent reports  1   New small regions of patchy hyperdensity in the right occipital lobe and posterior medial right temporal lobe, possibly related to contrast stasis and staining associated with regions of ischemia/infarction  Alternatively, small petechial   hemorrhages in this region are not excluded but felt to be less likely  Continued clinical and imaging surveillance recommended  3   Moderate, chronic microangiopathy      Micro:  Blood Culture: No results found for: BLOODCX  Urine Culture: No results found for: URINECX  Sputum Culture: No components found for: SPUTUMCX  Wound Culure: No results found for: WOUNDCULT    Impression:  Active Problems:    * No active hospital problems  *      Plan:  Admit to ICU, anticipate greater than 2 midnight stay      Neuro:   R-PCA stroke s/p thrombectomy  · TTE  · CTH tomorrow in AM  · MRI  Today  · Neurosurgery consult  · Neuroconsult  · Sedation plan: propofol at 35 mcg/min/kg  · RASS goal: -1 Drowsy and 0 Alert and Calm  · Pain controlled with: acetaminophen 650 q4h   · Regulate sleep/wake cycle  · Delirium precautions  · CAM-ICU daily  · Trend neuro exam: q1h neurp checks  · Lipid Panel:   · Atorvastatin 40mg   CV:   pAF off A/C  · Nicardipine Drip at 2 mcg/hr/kg  · Wean anti-hypertensive as able  · MAP goal > 65, systolic BP goal of [phone]  · Rhythm: NSR  · Follow rhythm on telemetry  · Will Discuss resuming AC and AP   · Daily weight  Lung:   CAP w/ acute aspiration  · AC 14 VC  at PEEP 5 on 50%  · Wean FiO2 as able  · SBT: daily   · Chlorhexidine ordered  · SpO2 goal >92%  · Pulmonary toileting with mechanical venitlation  GI:   · Stress ulcer prophylaxis: Protonix IV  · Bowel regimen: PRN  FEN:   · Fluid/Diuretic plan: 50 ml/hr NS  · Goal 24 hour fluid balance: euvolemic   · Nutrition/diet plan: NPO for emesis  · Replete electrolytes with goals: K >4 0, Mag >2 0, and Phos >3 0  :   · Indwelling Shore: yes   · Trend UOP and BUN/creat  · Strict I and O  ID:   CAP  · Trend temps and WBC count  · Maintain normothermia  · Infection source: PNA  · Abx ordered: ceftriaxone/flagyl day 1 of 7   Heme:   Hx of hemoptysis   · Trend hgb and plts  · Transfuse as needed for goal hgb >7  Endo:   · Glycemic control plan: q6h accuchecks while NPO  · Goal sugars of 140-180  MSK/Skin:   · Frequent turning and pressure off-loading  · Local wound care as needed  · PT/OT consults  · PMR consult    Disposition: ICU admission  Given critical illness, patient length of stay will require greater than two midnights  VTE Pharmacologic Prophylaxis: Holding until discussed with neurosurg  VTE Mechanical Prophylaxis: sequential compression device    Invasive lines and devices: Invasive Devices     Central Venous Catheter Line            CVC Central Lines 09/19/19 Triple less than 1 day          Peripheral Intravenous Line            Peripheral IV 09/19/19 Left Antecubital less than 1 day    Peripheral IV 09/19/19 Left Hand less than 1 day          Arterial Line            Arterial Line 09/19/19 Left Radial less than 1 day          Drain            Urethral Catheter Non-latex less than 1 day          Airway            ETT  Cuffed;Oral 7 mm less than 1 day                Code Status: Level 1 - Full Code    Counseling / Coordination of Care  Total Critical Care time spent 30 minutes excluding procedures, teaching and family updates          SIGNATURE: Emiliano Boucher  DATE: September 19, 2019  TIME: 11:28 AM

## 2019-09-19 NOTE — CONSULTS
PHYSICAL MEDICINE AND REHABILITATION CONSULT NOTE  Lulu Paulain 80 y o  female MRN: [id number]  Unit/Bed#: ICU 04 Encounter: [id number]    Requested by (Physician/Service): Oswaldo Sunshine MD  Reason for Consultation:  Assessment of rehabilitation needs    Assessment:  Rehabilitation Diagnosis: CVA      Recommendations:  Rehabilitation Plan:  Continue PT/OT while on acute care  The patient will likely require inpatient rehabilitation given deficits on exam   Will continue to follow to determine level that the patient will tolerate once medically stable  Bowel plan: monitor off of medications  Bladder plan:  Recommend d/c owusu once more mobile, likely will be done today  Follow-up in outpatient physiatry clinic - added to AVS    Medical Co-morbidities:  - Right PCA CVA s/p thrombectomy (ASA/statin)  - Left sided weakness  - Left visual field cut  - P1 occlusion s/p thrombectomy  - Afib currently on nicardipine drip and lopressor  - Hemoptysis now off of anti-coagulation    DVT ppx: SQ heparin and SCD    Thank you for this consultation  Do not hesitate to contact service with further questions  JANAK Barrios      History of Present Illness:  Lulu Baxter is a 80 y o  female with a PMH of HLD, Asthma, GERD, fibromyalgia, afib off of AC due to hemoptysis in September, and CVA in March who [facility] to [facility] on 9/19/2019 from [facility] with left sided hemiparesis, right gaze preference  She was found to have an acute right PCA P1 occlusion  She was not a tPA candidate and was transferred to [place] [address] for thrombectomy which she underwent with successful reperfusion  SBP goal is 100-140  She was admitted to ICU  The patient was seen in the ICU working with therapy  She reports pain in her neck where her central line is  She also has pain in her groin when lifting her right leg  She denies all other complaints      Review of Systems: 10 point ROS negative except for what is noted in HPI    Function:  Prior level of function and living situation:  The patient reports that she lives with her spouse in a single story home with a few steps to enter  Current level of function:  Physical Therapy:  Pending  Occupational Therapy:  Pending  Speech Therapy:  Pending      Physical Exam:  BP (!) 149/45   Pulse 77   Temp 98 2 °F (36 8 °C)   Resp 16   SpO2 99%        Intake/Output Summary (Last 24 hours) at 9/19/2019 1612  Last data filed at 9/19/2019 1501  Gross per 24 hour   Intake 473 68 ml   Output 1925 ml   Net -1451 32 ml       There is no height or weight on file to calculate BMI  Gen: No acute distress, Well-nourished, well-appearing  HEENT: Moist mucus membranes, Normocephalic/Atraumatic  Cardiovascular: Regular rate, rhythm  Heme/Extr: No edema/clubbing/cyanosis  Pulmonary: Non-labored breathing  : [place]  GI: Soft, non-tender, non-distended  MSK: ROM limited RLE  Left sided weakness  Integumentary: Skin is warm, dry  No rashes or ulcers  Neuro: The patient was oriented to person and place, she was not oriented to date or time  She has a left sided visual field cut  Motor:   RUE:  4/5 throughout  LUE:  3/5 shoulder abduction, elbow flexion and extension, 4/5 finger flexors  RLE:  Hip flexors 3/5, knee extension and flexion 4/5  LLE: 3/5 throughout  Psych: flat affect         Social History:    Social History     Socioeconomic History    Marital status: /Civil Union     Spouse name: None    Number of children: None    Years of education: None    Highest education level: None   Occupational History    None   Social Needs    Financial resource strain: None    Food insecurity:     Worry: None     Inability: None    Transportation needs:     Medical: None     Non-medical: None   Tobacco Use    Smoking status: Never Smoker    Smokeless tobacco: Never Used   Substance and Sexual Activity    Alcohol use: None    Drug use: None    Sexual activity: None   Lifestyle    Physical activity:     Days per week: None     Minutes per session: None    Stress: None   Relationships    Social connections:     Talks on phone: None     Gets together: None     Attends Rastafari service: None     Active member of club or organization: None     Attends meetings of clubs or organizations: None     Relationship status: None    Intimate partner violence:     Fear of current or ex partner: None     Emotionally abused: None     Physically abused: None     Forced sexual activity: None   Other Topics Concern    None   Social History Narrative    None        Family History:    Family History   Problem Relation Age of Onset    Neuropathy Other          Medications:     Current Facility-Administered Medications:     aspirin chewable tablet 81 mg, 81 mg, Per NG Tube, Daily, Derrell Barriga MD, 81 mg at 09/19/19 1457    atorvastatin (LIPITOR) tablet 40 mg, 40 mg, Per NG Tube, QPM, Sagar Kruger PA-C    cefTRIAXone (ROCEPHIN) 1,000 mg in dextrose 5 % 50 mL IVPB, 1,000 mg, Intravenous, Q24H, Britney Wilson DO, Stopped at 09/19/19 1501    chlorhexidine (PERIDEX) 0 12 % oral rinse 15 mL, 15 mL, Swish & Spit, Q12H [address], Sagar Kruger PA-C, 15 mL at 09/19/19 1254    heparin (porcine) injection, , , Code/Trauma/Sedation Med, Oswaldo Sunshine MD, 2,000 Units at 09/19/19 0749    heparin (porcine) subcutaneous injection 5,000 Units, 5,000 Units, Subcutaneous, Q8H [address], Derrell Barriga MD, 5,000 Units at 09/19/19 1457    metoprolol tartrate (LOPRESSOR) tablet 25 mg, 25 mg, Oral, Q12H [address], Zuleika Garcia PA-C    metroNIDAZOLE (FLAGYL) IVPB (premix) 500 mg, 500 mg, Intravenous, Q8H, Britney Wilson DO, Stopped at 09/19/19 1501    niCARdipine (CARDENE) 25 mg (STANDARD CONCENTRATION) in sodium chloride 0 9% 250 mL, 1-15 mg/hr, Intravenous, Titrated, Angelia Jeans Cross, PA-C, Last Rate: 20 mL/hr at 09/19/19 1533, 2 mg/hr at 09/19/19 1533    nitroGLYcerin (TRIDIL) 50 mg in 250 mL infusion (premix), , Intra-arterial, Code/Trauma/Sedation Med, Marcos Triplett MD, 150 mcg at 09/19/19 0747    PARoxetine (PAXIL) tablet 10 mg, 10 mg, Oral, Daily, Sagar Kruger PA-C    propofol (DIPRIVAN) 1000 mg in 100 mL infusion (premix), 5-50 mcg/kg/min, Intravenous, Titrated, Eric Kruger PA-C, Last Rate: 16 1 mL/hr at 09/19/19 1458, 35 mcg/kg/min at 09/19/19 1458    sodium chloride 0 9 % infusion, 50 mL/hr, Intravenous, Continuous, Geno Jackman DO, Last Rate: 50 mL/hr at 09/19/19 1257, 50 mL/hr at 09/19/19 1257    verapamil (ISOPTIN) injection, , Intra-arterial, Code/Trauma/Sedation Med, Marcos Triplett MD, 5 mg at 09/19/19 0749    Past Medical History:     Past Medical History:   Diagnosis Date    Asthma     Atrial fibrillation (HCC)     Fibromyalgia     GERD (gastroesophageal reflux disease)     H/O emphysema     Hypercholesterolemia         Past Surgical History:     Past Surgical History:   Procedure Laterality Date    CHOLECYSTECTOMY      HYSTERECTOMY           Allergies: Allergies   Allergen Reactions    Ciprofloxacin     Penicillins      Other reaction(s): Itching           LABORATORY RESULTS:      Lab Results   Component Value Date    HGB 11 4 (L) 09/19/2019    HCT 36 0 09/19/2019    WBC 13 42 (H) 09/19/2019     Lab Results   Component Value Date    BUN 17 09/19/2019    K 3 4 (L) 09/19/2019     (H) 09/19/2019    GLUCOSE 136 09/19/2019    CREATININE 0 61 09/19/2019     No results found for: PROTIME, INR     DIAGNOSTIC STUDIES: Reviewed  Xr Chest Portable    Result Date: 9/19/2019  Impression: Right jugular central line tip in satisfactory position  No pneumothorax  Cardiomegaly without vascular congestion  Scattered opacities in both lungs presumably atelectatic although infiltrates are not excluded by this x-ray  Follow-up as clinically appropriate  Workstation performed: [id number]     Ct Head Wo Contrast    Result Date: 9/19/2019  Impression: 1    New small regions of patchy hyperdensity in the right occipital lobe and posterior medial right temporal lobe, possibly related to contrast stasis and staining associated with regions of ischemia/infarction  Alternatively, small petechial hemorrhages in this region are not excluded but felt to be less likely  Continued clinical and imaging surveillance recommended  3   Moderate, chronic microangiopathy  Workstation performed: [id number]     Ct Cerebral Perfusion    Result Date: 9/19/2019  Impression: CT perfusion performed  Data available on PACS   Workstation performed: [id number]

## 2019-09-19 NOTE — PROGRESS NOTES
Stroke Alert Response Note    Alert Called 4:45 AM to my cell phone  Phone response: 4:45  NIHSS per Ed - 16  tPA - no, wake up stroke, out of window, unable to obtain stat MRI in [facility]  Patient with A fib, recent strokes, off A/C due to hemoptysis since early September with left sided hemiparesis, right gaze  Last normal 9:30 PM  No LVO or blood on CT per radiology at [address] [place] [address] (personally discussed)  No interventional target and unfortunately out of tPA window  SBP on low end (120's)    Recommended BP goal 140-185  Continue aspirin/plavix  Atorvastatin 80  TTE this morning  DVT chemoprophylaxis  Left my cell phone number with Dr Shona Hearn in the ER - please call with questions  Aniceto Burnette MD    Addendum - Got called back by Dr Mari Aguirre - PCA occlusion on the right - new  Spoke to night akanksha from [facility] - Dr Germaine Platt who confirmed new PCA occlusion compared to August CTA h/n on the right  Dr Mari Aguirre also confirmed that there was a left VF cut, corresponding to this lesion  Called Dr Dayron Milian who reviewed case and imaging and agreed for transfer for consideration for thrombectomy  (iRAPID and then thrombectomy if appropriate)  Called back Dr Rebecca Broussard to discuss - given LVO recommended -190, patient still in 120's despite bolus of fluids, recommended immediate pressors be started  Dr Mari Aguirre expressed understanding and will optimize her BP  Dr Dayron Milian is setting up transfer with PACS       Aniceto Burnette MD

## 2019-09-19 NOTE — CONSULTS
Consultation - Neurosurgery   Uri Garrison 80 y o  female MRN: [id number]  Unit/Bed#:  Encounter: [id number]      Assessment/Plan     Assessment:  Right PCA P1 occlusion  Plan:  Ms Rocio Dudley presents with an acute right PCA P1 occlusion  Plan is for mechanical thrombectomy  Due to her inability to provide consent and no next of kin available at this time, procedure will be performed on an emergency consent basis  History of Present Illness   HPI: Uri Garrison is a 80y o  year old female who presents with acute CVA  She went to bed at approximately 9:30 pm in her usual state of health  Awoke at approximately 3:30 am with inability to  her left side and awoke her  asking for help  Brought to [facility] where an acute right PCA P1 occlusio was found  Vomiting  Left plegia and right gaze preference  She is not a tPA candidate  NIHSS 16  History of afib not on AC  Recent pneumonia diagnosis      Consults    Review of Systems   Unable to perform ROS: Acuity of condition       Historical Information   Past Medical History:   Diagnosis Date    Asthma     Atrial fibrillation ([address] )     Fibromyalgia     GERD (gastroesophageal reflux disease)     H/O emphysema     Hypercholesterolemia      Past Surgical History:   Procedure Laterality Date    CHOLECYSTECTOMY      HYSTERECTOMY       Social History     Substance and Sexual Activity   Alcohol Use Not on file     Social History     Substance and Sexual Activity   Drug Use Not on file     Social History     Tobacco Use   Smoking Status Never Smoker   Smokeless Tobacco Never Used     Family History   Problem Relation Age of Onset    Neuropathy Other        Meds/Allergies   all current active meds have been reviewed  Allergies   Allergen Reactions    Ciprofloxacin     Penicillins      Other reaction(s): Itching       Objective   No intake or output data in the 24 hours ending 09/19/19 0720    Physical Exam   Constitutional: She appears well-developed and well-nourished  She appears distressed  HENT:   Head: Normocephalic and atraumatic  Cardiovascular: Intact distal pulses  Neurological:   Left visual field cut    4-/5 LUE and LLE  A bit somnolent but arousable  Intermittently answers questions appropriately  Skin: Skin is warm and dry  Neurologic Exam    Vitals: There were no vitals taken for this visit  ,There is no height or weight on file to calculate BMI  Lab Results: I have personally reviewed pertinent results  Imaging Studies: I have personally reviewed pertinent reports  EKG, Pathology, and Other Studies: I have personally reviewed pertinent reports        VTE Prophylaxis: Sequential compression device Stan Cohen)     Code Status: No Order  Advance Directive and Living Will:      Power of :    POLST:      Counseling / Coordination of Care  None

## 2019-09-19 NOTE — PROGRESS NOTES
POD0 from right PCA thrombectomy  TICI 3 reperfusion  Keep -140  May start DVT ppx and/or ASA   Obtain head CT in the am

## 2019-09-19 NOTE — SEDATION DOCUMENTATION
2103: Puncture - Right Radial  0846: First Pass  0853: Recan  Pre TICI -0  Post ANNA -3 Billing Type: Third-Party Bill

## 2019-09-19 NOTE — ANESTHESIA PROCEDURE NOTES
Arterial Line Insertion  Performed by: Rizwan Kenney CRNA  Authorized by: Leonor Phan MD   Consent: The procedure was performed in an emergent situation  Required items: required blood products, implants, devices, and special equipment available  Patient identity confirmed: arm band  Time out: Immediately prior to procedure a "time out" was called to verify the correct patient, procedure, equipment, support staff and site/side marked as required  Preparation: Patient was prepped and draped in the usual sterile fashion    Indications: multiple ABGs, respiratory failure and hemodynamic monitoring  Orientation:  Left  Location: radial artery  Procedure Details:  Tobin's test normal: yes  Needle gauge: 20  Seldinger technique: Seldinger technique used  Number of attempts: 3    Post-procedure:  Post-procedure: dressing applied  Waveform: good waveform and waveform confirmed  Post-procedure CNS: normal and unchanged  Patient tolerance: Patient tolerated the procedure well with no immediate complications  Comments: Difficult access, Dynamic ultrasound guidance used successfully

## 2019-09-19 NOTE — CONSULTS
Consultation - Neurology   Alex Marti 80 y o  female MRN: [id number]  Unit/Bed#: ICU 04 Encounter: [id number]      Physician Requesting Consult: Johanny Smith MD  Inpatient consult to Neurology  Consult performed by: [address] ordered by: Maribeth Ortega PA-C        Reason for Consult / Principal Problem: Acute R PCA Stroke     Hx and PE limited by: recent extubation, labile HR (ranging from 50's-140's)    Assessment:  Ms Cynthia Bennett is a 79 yo F w PMHx sig for afib off anticoagulation for 2 weeks due to hemoptysis, prior CVA in 3/19 with residual R sided weakness, HLD, CAP who presented with L sided hemiparesis and R gaze preference found to have R PCA P1 occlusion and is s/p thrombectomy this AM      Plan:  #Acute R PCA stroke - acute, resolving (s/p embolectomy this AM, likely cardioembolic in nature pt w known hx of afib off anticoagulation for 2 weeks 2/2 hemoptysis)  -TTE 9/19/19: EF 65%  -post op CT head: new small regions of patchy hyperdensity in the right occipital lobe and posterior medial right temporal lobe, possibly related to contrast stasis and staining associated with regions of ischemia/infarction  alternatively, small petechial hemorrhages in this region are not excluded but felt to be less likely  -f/u CT head in the AM  -obtain MRI    -SBP goal 100-140  -continue aspirin, statin   -need for long term anticoagulation plan     HPI: Alex Marti is a 80 y o  female w PMHx sig for afib off anticoagulation for 2 weeks 2/2 hemoptysis, prior CVA in 3/19 with residual R sided weakness who presented with L sided hemiparesis and fixed R gaze  Last known normal 9:30PM out of TPA window  Found to have R PCA P1 occlusion and is now s/p thrombectomy this AM  Patient also now extubated able to respond to questions and follow commands       ROS: not done     Historical Information   Past Medical History:   Diagnosis Date    Asthma     Atrial fibrillation (HCC)     Fibromyalgia     GERD (gastroesophageal reflux disease)     H/O emphysema     Hypercholesterolemia      Past Surgical History:   Procedure Laterality Date    CHOLECYSTECTOMY      HYSTERECTOMY       Social History   Social History     Tobacco Use   Smoking Status Never Smoker   Smokeless Tobacco Never Used     Social History     Substance and Sexual Activity   Alcohol Use Not on file     Social History     Substance and Sexual Activity   Drug Use Not on file       Family History: deferred     Meds/Allergies     Allergies   Allergen Reactions    Ciprofloxacin     Penicillins      Other reaction(s): Itching       all current active meds have been reviewed    Objective   Vitals:Blood pressure (!) 149/45, pulse 84, temperature 98 6 °F (37 °C), resp  rate (!) 23, SpO2 100 %  ,There is no height or weight on file to calculate BMI      Intake/Output Summary (Last 24 hours) at 9/19/2019 1718  Last data filed at 9/19/2019 1601  Gross per 24 hour   Intake 473 68 ml   Output 2125 ml   Net -1651 32 ml       Physical Exam:   Physical Exam General appearance: alert, appears stated age and cooperative  Head: Normocephalic, without obvious abnormality, atraumatic  Throat: lips, mucosa, and tongue normal; teeth and gums normal  Neck: no adenopathy, no carotid bruit and no JVD  Lungs: clear to auscultation bilaterally  Heart: S1, S2 normal, no murmurs, clicks, rubs or gallops  Abdomen: soft, non-distended   Extremities: extremities normal, atraumatic, no cyanosis or edema, LE swelling bilaterally +2 DP pulses bilaterally     Neurologic:  Neurologic Exam   Mental Status:  Easily arousable to voice, opens eyes  Speech fluent, comprehensible   Follows simple commands   Cranial Nerves:  Pupils equal, round, and reactive to light bilaterally; right gaze preference noted, visual fields intact on right, diminished peripherally on left (chronic per patient), blink to threat present on right, variable on left   Facial expressions symmetric   Sensation to light touch equal bilaterally   Uvula midline  Tongue midline   Motor Exam:  Muscle bulk: normal  Overall muscle tone: normal  Strength: +3/5 able to move all extremities against gravity on command   Unable to assess fine movements, normal finger to nose, dysdiadochokinesia  Sensory Exam:  Responsive to light touch in all four extremities   Gait, Coordination, and Reflexes   Reflexes:   Right brachioradialis: 0  Left brachioradialis: 0  Right biceps: +2  Left biceps: +2  Right triceps: 0  Left triceps: 0  Right patellar: +2  Left patellar: +2  Right achilles: 0  Left achilles:  0  Babinski: downward  Gait: unable to assess     Lab Results: I have personally reviewed pertinent reports  Imaging Studies: I have personally reviewed pertinent reports  EKG, Pathology, and Other Studies: I have personally reviewed pertinent reports        [address] [facility], MS-3

## 2019-09-19 NOTE — BRIEF OP NOTE (RAD/CATH)
IR STROKE ALERT  Procedure Note    PATIENT NAME: Maria L Romeo  : 1934  MRN: [id number]     Pre-op Diagnosis:   1  Cerebrovascular accident (CVA) due to embolism of anterior cerebral artery, unspecified blood vessel laterality (HCC)      Post-op Diagnosis:   1  Cerebrovascular accident (CVA) due to embolism of anterior cerebral artery, unspecified blood vessel laterality [facility])        Surgeon:   Mariah Davalos MD  Assistants:   No qualified resident was available, Resident is only observing    Estimated Blood Loss: 200 cc  Findings:     Right PCA P1 occlusion, TICI 0     PUNCTURE: 07:46  FIRST PASS: 08:46 , [place]  RECAN: 08:53 , TICI 3    Right radial access closed by TR band  Right femoral accessclosure device deployed       Specimens: none    Complications:  none    Anesthesia: General    Mariah Davalos MD     Date: 2019  Time: 9:25 AM

## 2019-09-19 NOTE — RESPIRATORY THERAPY NOTE
RT Protocol Note  Loraine Jeffries 80 y o  female MRN: [id number]  Unit/Bed#: ICU 04 Encounter: [id number]    Assessment    Principal Problem:    Acute right PCA stroke ([facility] )      Home Pulmonary Medications:  brero       Past Medical History:   Diagnosis Date    Asthma     Atrial fibrillation ([facility] )     Fibromyalgia     GERD (gastroesophageal reflux disease)     H/O emphysema     Hypercholesterolemia      Social History     Socioeconomic History    Marital status: /Civil Union     Spouse name: None    Number of children: None    Years of education: None    Highest education level: None   Occupational History    None   Social Needs    Financial resource strain: None    Food insecurity:     Worry: None     Inability: None    Transportation needs:     Medical: None     Non-medical: None   Tobacco Use    Smoking status: Never Smoker    Smokeless tobacco: Never Used   Substance and Sexual Activity    Alcohol use: None    Drug use: None    Sexual activity: None   Lifestyle    Physical activity:     Days per week: None     Minutes per session: None    Stress: None   Relationships    Social connections:     Talks on phone: None     Gets together: None     Attends Yazdanism service: None     Active member of club or organization: None     Attends meetings of clubs or organizations: None     Relationship status: None    Intimate partner violence:     Fear of current or ex partner: None     Emotionally abused: None     Physically abused: None     Forced sexual activity: None   Other Topics Concern    None   Social History Narrative    None       Subjective         Objective    Physical Exam:   Assessment Type: Assess only  General Appearance: Awake  Respiratory Pattern: Normal  Chest Assessment: Chest expansion symmetrical  Bilateral Breath Sounds: Diminished, Clear  Cough: Productive  Suction: ET Tube    Vitals:  Blood pressure (!) 149/45, pulse 72, temperature 99 °F (37 2 °C), resp   rate 21, SpO2 98 %           Imaging and other studies: I have personally reviewed pertinent reports              Plan    Respiratory Plan: (P) Vent/NIV/HFNC        Resp Comments: pt extubated without incisdence; placed on NC at 2 lpm; c/o sore throat; no stridor

## 2019-09-19 NOTE — ANESTHESIA PREPROCEDURE EVALUATION
Review of Systems/Medical History  Patient summary reviewed  Chart reviewed  No history of anesthetic complications     Cardiovascular  Hyperlipidemia, Dysrhythmias , atrial fibrillation,    Pulmonary  COPD , Asthma ,        GI/Hepatic    GERD ,        Negative  ROS        Endo/Other  Negative endo/other ROS      GYN  Negative gynecology ROS          Hematology    Coagulation disorder currently taking oral anticoagulants,    Musculoskeletal  Back pain , lumbar pain and spinal stenosis, Sciatica,        Neurology    CVA , acute, Fibromyalgia   Psychology     Chronic opioid dependence Chronic pain,            Physical Exam    Airway    Mallampati score: II  TM Distance: >3 FB  Neck ROM: full     Dental   No notable dental hx     Cardiovascular  Rhythm: regular, Rate: normal, Cardiovascular exam normal    Pulmonary  Pulmonary exam normal Breath sounds clear to auscultation,     Other Findings        Anesthesia Plan  ASA Score- 4 Emergent    Anesthesia Type- general with ASA Monitors  Additional Monitors: arterial line  Airway Plan: ETT  Plan Factors-    Induction- intravenous and rapid sequence induction  Postoperative Plan- Plan for postoperative opioid use  Planned trial extubation    Informed Consent- Anesthetic plan and risks discussed with patient  I personally reviewed this patient with the CRNA  Discussed and agreed on the Anesthesia Plan with the CRNA  Genaro Wolf MD, have personally seen and evaluated the patient prior to anesthetic care  I have reviewed the pre-anesthetic record, and other medical records if appropriate to the anesthetic care  If a CRNA is involved in the case, I have reviewed the CRNA assessment, if present, and agree  Risks/benefits and alternatives discussed with patient including possible PONV, sore throat, and possibility of rare anesthetic and surgical emergencies

## 2019-09-19 NOTE — ANESTHESIA PROCEDURE NOTES
Central Line Insertion  Performed by: Aye Vega CRNA  Authorized by: Nestor Cantu MD   Date/Time: 9/19/2019 9:25 AM  Catheter Type:  triple lumen  Consent: The procedure was performed in an emergent situation  Required items: required blood products, implants, devices, and special equipment available  Patient identity confirmed: arm band  Time out: Immediately prior to procedure a "time out" was called to verify the correct patient, procedure, equipment, support staff and site/side marked as required  Indications: vascular access  Location details: right internal jugular  Catheter size: 7 Fr  Patient position: flat  Assessment: blood return through all ports and free fluid flow  Preparation: Chloraprep and skin prepped with 2% chlorhexidine  Skin prep agent dried: skin prep agent completely dried prior to procedure  Sterile barriers: all five maximum sterile barriers used - cap, mask, sterile gown, sterile gloves, and large sterile sheet  Hand hygiene: hand hygiene performed prior to central venous catheter insertion  Ultrasound guidance: yes  sterile gel and probe cover used in ultrasound-guided central venous catheter insertionReason All Sterile Barriers Not Used:   All elements of maximal sterile barrier technique not followed for medical reasons  Pre-procedure: landmarks identified  Number of attempts: 1  Successful placement: yes  Post-procedure: chlorhexidine patch applied,  dressing applied and line sutured  Patient tolerance: Patient tolerated the procedure well with no immediate complications

## 2019-09-20 ENCOUNTER — APPOINTMENT (INPATIENT)
Dept: RADIOLOGY | Facility: HOSPITAL | Age: 84
DRG: 023 | End: 2019-09-20
Payer: MEDICARE

## 2019-09-20 LAB
ANION GAP SERPL CALCULATED.3IONS-SCNC: 7 MMOL/L (ref 4–13)
APTT PPP: 28 SECONDS (ref 23–37)
APTT PPP: 58 SECONDS (ref 23–37)
BASOPHILS # BLD AUTO: 0.09 THOUSANDS/ΜL (ref 0–0.1)
BASOPHILS NFR BLD AUTO: 1 % (ref 0–1)
BUN SERPL-MCNC: 12 MG/DL (ref 5–25)
CA-I BLD-SCNC: 1.02 MMOL/L (ref 1.12–1.32)
CA-I BLD-SCNC: 1.08 MMOL/L (ref 1.12–1.32)
CALCIUM SERPL-MCNC: 8 MG/DL (ref 8.3–10.1)
CHLORIDE SERPL-SCNC: 116 MMOL/L (ref 100–108)
CHOLEST SERPL-MCNC: 115 MG/DL (ref 50–200)
CO2 SERPL-SCNC: 21 MMOL/L (ref 21–32)
CREAT SERPL-MCNC: 0.6 MG/DL (ref 0.6–1.3)
EOSINOPHIL # BLD AUTO: 0.05 THOUSAND/ΜL (ref 0–0.61)
EOSINOPHIL NFR BLD AUTO: 0 % (ref 0–6)
ERYTHROCYTE [DISTWIDTH] IN BLOOD BY AUTOMATED COUNT: 14.5 % (ref 11.6–15.1)
EST. AVERAGE GLUCOSE BLD GHB EST-MCNC: 111 MG/DL
GFR SERPL CREATININE-BSD FRML MDRD: 83 ML/MIN/1.73SQ M
GLUCOSE SERPL-MCNC: 94 MG/DL (ref 65–140)
HBA1C MFR BLD: 5.5 % (ref 4.2–6.3)
HCT VFR BLD AUTO: 34.9 % (ref 34.8–46.1)
HDLC SERPL-MCNC: 54 MG/DL (ref 40–60)
HGB BLD-MCNC: 11 G/DL (ref 11.5–15.4)
IMM GRANULOCYTES # BLD AUTO: 0.06 THOUSAND/UL (ref 0–0.2)
IMM GRANULOCYTES NFR BLD AUTO: 0 % (ref 0–2)
INR PPP: 1.16 (ref 0.84–1.19)
LDLC SERPL CALC-MCNC: 47 MG/DL (ref 0–100)
LYMPHOCYTES # BLD AUTO: 2.41 THOUSANDS/ΜL (ref 0.6–4.47)
LYMPHOCYTES NFR BLD AUTO: 17 % (ref 14–44)
MAGNESIUM SERPL-MCNC: 1.8 MG/DL (ref 1.6–2.6)
MAGNESIUM SERPL-MCNC: 2.4 MG/DL (ref 1.6–2.6)
MCH RBC QN AUTO: 27.2 PG (ref 26.8–34.3)
MCHC RBC AUTO-ENTMCNC: 31.5 G/DL (ref 31.4–37.4)
MCV RBC AUTO: 86 FL (ref 82–98)
MONOCYTES # BLD AUTO: 1.09 THOUSAND/ΜL (ref 0.17–1.22)
MONOCYTES NFR BLD AUTO: 8 % (ref 4–12)
NEUTROPHILS # BLD AUTO: 10.49 THOUSANDS/ΜL (ref 1.85–7.62)
NEUTS SEG NFR BLD AUTO: 74 % (ref 43–75)
NRBC BLD AUTO-RTO: 0 /100 WBCS
PHOSPHATE SERPL-MCNC: 2.2 MG/DL (ref 2.3–4.1)
PLATELET # BLD AUTO: 372 THOUSANDS/UL (ref 149–390)
PMV BLD AUTO: 10.4 FL (ref 8.9–12.7)
POTASSIUM SERPL-SCNC: 3.2 MMOL/L (ref 3.5–5.3)
POTASSIUM SERPL-SCNC: 4.7 MMOL/L (ref 3.5–5.3)
PROTHROMBIN TIME: 14.4 SECONDS (ref 11.6–14.5)
RBC # BLD AUTO: 4.05 MILLION/UL (ref 3.81–5.12)
SODIUM SERPL-SCNC: 144 MMOL/L (ref 136–145)
TRIGL SERPL-MCNC: 70 MG/DL
WBC # BLD AUTO: 14.19 THOUSAND/UL (ref 4.31–10.16)

## 2019-09-20 PROCEDURE — 99232 SBSQ HOSP IP/OBS MODERATE 35: CPT | Performed by: PHYSICIAN ASSISTANT

## 2019-09-20 PROCEDURE — 84100 ASSAY OF PHOSPHORUS: CPT | Performed by: EMERGENCY MEDICINE

## 2019-09-20 PROCEDURE — 83735 ASSAY OF MAGNESIUM: CPT | Performed by: PHYSICIAN ASSISTANT

## 2019-09-20 PROCEDURE — 85730 THROMBOPLASTIN TIME PARTIAL: CPT | Performed by: PHYSICIAN ASSISTANT

## 2019-09-20 PROCEDURE — G8979 MOBILITY GOAL STATUS: HCPCS

## 2019-09-20 PROCEDURE — 83036 HEMOGLOBIN GLYCOSYLATED A1C: CPT | Performed by: PHYSICIAN ASSISTANT

## 2019-09-20 PROCEDURE — 82330 ASSAY OF CALCIUM: CPT | Performed by: PHYSICIAN ASSISTANT

## 2019-09-20 PROCEDURE — 94760 N-INVAS EAR/PLS OXIMETRY 1: CPT

## 2019-09-20 PROCEDURE — 80061 LIPID PANEL: CPT | Performed by: PHYSICIAN ASSISTANT

## 2019-09-20 PROCEDURE — 99232 SBSQ HOSP IP/OBS MODERATE 35: CPT | Performed by: PSYCHIATRY & NEUROLOGY

## 2019-09-20 PROCEDURE — 85610 PROTHROMBIN TIME: CPT | Performed by: PHYSICIAN ASSISTANT

## 2019-09-20 PROCEDURE — 97167 OT EVAL HIGH COMPLEX 60 MIN: CPT

## 2019-09-20 PROCEDURE — 82330 ASSAY OF CALCIUM: CPT | Performed by: EMERGENCY MEDICINE

## 2019-09-20 PROCEDURE — 84132 ASSAY OF SERUM POTASSIUM: CPT | Performed by: EMERGENCY MEDICINE

## 2019-09-20 PROCEDURE — 70450 CT HEAD/BRAIN W/O DYE: CPT

## 2019-09-20 PROCEDURE — 83735 ASSAY OF MAGNESIUM: CPT | Performed by: EMERGENCY MEDICINE

## 2019-09-20 PROCEDURE — G8988 SELF CARE GOAL STATUS: HCPCS

## 2019-09-20 PROCEDURE — G8987 SELF CARE CURRENT STATUS: HCPCS

## 2019-09-20 PROCEDURE — 97163 PT EVAL HIGH COMPLEX 45 MIN: CPT

## 2019-09-20 PROCEDURE — NC001 PR NO CHARGE: Performed by: PHYSICIAN ASSISTANT

## 2019-09-20 PROCEDURE — 99233 SBSQ HOSP IP/OBS HIGH 50: CPT | Performed by: INTERNAL MEDICINE

## 2019-09-20 PROCEDURE — 85730 THROMBOPLASTIN TIME PARTIAL: CPT | Performed by: INTERNAL MEDICINE

## 2019-09-20 PROCEDURE — 85025 COMPLETE CBC W/AUTO DIFF WBC: CPT | Performed by: PHYSICIAN ASSISTANT

## 2019-09-20 PROCEDURE — 92610 EVALUATE SWALLOWING FUNCTION: CPT

## 2019-09-20 PROCEDURE — 80048 BASIC METABOLIC PNL TOTAL CA: CPT | Performed by: PHYSICIAN ASSISTANT

## 2019-09-20 PROCEDURE — G8978 MOBILITY CURRENT STATUS: HCPCS

## 2019-09-20 RX ORDER — POTASSIUM CHLORIDE 29.8 MG/ML
40 INJECTION INTRAVENOUS ONCE
Status: COMPLETED | OUTPATIENT
Start: 2019-09-20 | End: 2019-09-20

## 2019-09-20 RX ORDER — AMITRIPTYLINE HYDROCHLORIDE 25 MG/1
25 TABLET, FILM COATED ORAL
Status: DISCONTINUED | OUTPATIENT
Start: 2019-09-20 | End: 2019-09-25 | Stop reason: HOSPADM

## 2019-09-20 RX ORDER — HEPARIN SODIUM 10000 [USP'U]/100ML
3-20 INJECTION, SOLUTION INTRAVENOUS
Status: DISCONTINUED | OUTPATIENT
Start: 2019-09-20 | End: 2019-09-24

## 2019-09-20 RX ORDER — MAGNESIUM SULFATE HEPTAHYDRATE 40 MG/ML
2 INJECTION, SOLUTION INTRAVENOUS ONCE
Status: COMPLETED | OUTPATIENT
Start: 2019-09-20 | End: 2019-09-20

## 2019-09-20 RX ADMIN — METRONIDAZOLE 500 MG: 500 INJECTION, SOLUTION INTRAVENOUS at 03:30

## 2019-09-20 RX ADMIN — HEPARIN SODIUM AND DEXTROSE 12 UNITS/KG/HR: 10000; 5 INJECTION INTRAVENOUS at 13:49

## 2019-09-20 RX ADMIN — PAROXETINE 10 MG: 20 TABLET, FILM COATED ORAL at 08:53

## 2019-09-20 RX ADMIN — METOPROLOL TARTRATE 25 MG: 25 TABLET, FILM COATED ORAL at 21:26

## 2019-09-20 RX ADMIN — METRONIDAZOLE 500 MG: 500 INJECTION, SOLUTION INTRAVENOUS at 21:17

## 2019-09-20 RX ADMIN — CEFTRIAXONE 1000 MG: 1 INJECTION, POWDER, FOR SOLUTION INTRAMUSCULAR; INTRAVENOUS at 12:26

## 2019-09-20 RX ADMIN — CALCIUM GLUCONATE 1 G: 98 INJECTION, SOLUTION INTRAVENOUS at 02:49

## 2019-09-20 RX ADMIN — METOPROLOL TARTRATE 25 MG: 25 TABLET, FILM COATED ORAL at 08:53

## 2019-09-20 RX ADMIN — MAGNESIUM SULFATE HEPTAHYDRATE 2 G: 40 INJECTION, SOLUTION INTRAVENOUS at 01:54

## 2019-09-20 RX ADMIN — AMLODIPINE BESYLATE 2.5 MG: 2.5 TABLET ORAL at 08:54

## 2019-09-20 RX ADMIN — METRONIDAZOLE 500 MG: 500 INJECTION, SOLUTION INTRAVENOUS at 11:53

## 2019-09-20 RX ADMIN — AMITRIPTYLINE HYDROCHLORIDE 25 MG: 25 TABLET, FILM COATED ORAL at 21:26

## 2019-09-20 RX ADMIN — ATORVASTATIN CALCIUM 40 MG: 40 TABLET, FILM COATED ORAL at 18:21

## 2019-09-20 RX ADMIN — ASPIRIN 81 MG 81 MG: 81 TABLET ORAL at 08:54

## 2019-09-20 RX ADMIN — POTASSIUM CHLORIDE 40 MEQ: 29.8 INJECTION, SOLUTION INTRAVENOUS at 01:54

## 2019-09-20 RX ADMIN — POTASSIUM CHLORIDE 40 MEQ: 29.8 INJECTION, SOLUTION INTRAVENOUS at 04:30

## 2019-09-20 RX ADMIN — HEPARIN SODIUM 5000 UNITS: 5000 INJECTION INTRAVENOUS; SUBCUTANEOUS at 06:06

## 2019-09-20 NOTE — PROGRESS NOTES
Pastoral Care Progress Note    2019  Patient: Marcus Goodwin : 1934  Admission Date & Time: 2019 0732  MRN: [id number] [facility]: [id number]                     Chaplaincy Interventions Utilized:   Exploration: Explored hope, Explored spiritual needs & resources and Facilitated story telling   Pt's , Carlos Agarwal, did the majority of talking which the pt agreed with when asked   related to the  the core beliefs of their Latter-day and how their sandra supports them during times of illness/difficulty  He related how they try to live faithfully according to the Bible with the hope that they will be "resurrected" at the end of time  Relationship Building: Cultivated a relationship of care and support and Listened empathically     Ritual: Read sacred text   Read Psalm 139 from the Bible      Chaplaincy Outcomes Achieved:  Expressed ultimate hope, Identified meaningful connections, Progressed toward purpose and Spiritual resources utilized  Pt's  commented that their ultimate hope is to be "resurrected" at the end of time  This life is intended to be about living faithfully according to the Bible  Spiritual Coping Strategies Utilized:   Spiritual practices, Spiritual comfort, Spiritual meaning, Spiritual community and Surrender   Pt and her  stated that they frequently rely upon frequent prayer and Bible reading   They both also stated that God is at the center of everything and that it is up to them to submit to God in sandra        19 [facility] Involvement Patient active with Oriental orth[employer]   Denominational Leader Aware/Contacted Leader/[employer]odox aware of patient's status   Spiritual Beliefs/Perceptions   Concept of God Accepting;Punishing   God's Role in Disease Work of Devil   Relationship with God Close  (Expressed need to live faithfully so as not to be punished)   Spiritual Strengths Highly devoted to Rastafarian/Spiritism   Support Systems Holiness/sandra community; Spouse/significant other  Abdiel Dewitt ())   Coping Responses   Patient Coping Accepting   Family Coping Accepting;Open/discussion   Patient Spiritual Assessment   Fear of Death and Unknown Pt commented that she's not afraid of dying; dying means "it's one's time" to die   Family Spiritual Assessment   Fear of Death/Unknown Commented that death is part of God's will

## 2019-09-20 NOTE — PROGRESS NOTES
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, HLD, CAP who presented with L sided hemiparesis and R gaze preference found to have R PCA P1 occlusion and is POD 1 from thrombectomy  Principal Problem:    Acute right PCA stroke (HCC)    Plan:  #Acute R PCA stroke - acute, resolving (POD1 thrombectomy, likely cardioembolic in nature pt w known hx of afib off anticoagulation for 2 weeks 2/2 hemoptysis)  -TTE 9/19: EF 65%  -CT head 9/19: new small regions of patchy hyperdensity in the right occipital lobe and posterior medial right temporal lobe, possibly related to contrast stasis and staining associated with regions of ischemia/infarction  alternatively, small petechial hemorrhages in this region are not excluded but felt to be less likely  -CT head 9/20: 1  Evolving right PCA territory infarction, status post embolectomy post procedure day #1  Vague foci of increased density along the cortical margin, likely representing petechial hemorrhage  2   Cerebral atrophy with chronic small vessel ischemic change  3   Age-indeterminate infarction anterior aspect right hemipelvis    -obtain MRI: holter monitor can be removed    -continue plavix   -need for long term anticoagulation plan: patient and  unclear on what anticoagulation regimen patient was on prior to development of hemoptysis and subsequent discontinuation - called pharmacy in [place] and they only have eliquis on file last filled 8/22/19 at another unknown location  -PT/OT/SL recs     Current Length of Stay: 1 day(s)    Current Patient Status: Inpatient     Discharge Plan: per primary team   Code Status: Level 1 - Full Code    Subjective:   Overnight patient was in and out of afib with -50's, given metoprolol and cardizem doses, HR now 60-70's  Patient denies headache, n/v, focal visual deficits  Asking for , not able to remember events of past 24 hours but oriented to person and place and situation  Overall feels fatigued and weak but no focal deficits  Objective:     Vitals:   Temp (24hrs), Av 7 °F (36 5 °C), Min:94 3 °F (34 6 °C), Max:100 °F (37 8 °C)    Temp:  [94 3 °F (34 6 °C)-100 °F (37 8 °C)] 99 °F (37 2 °C)  HR:  [] 74  Resp:  [0-42] 42  BP: (149)/(45) 149/45  SpO2:  [89 %-100 %] 94 %  Body mass index is 28 14 kg/m²  Input and Output Summary (last 24 hours):        Intake/Output Summary (Last 24 hours) at 2019 0735  Last data filed at 2019 1636  Gross per 24 hour   Intake 1601 9 ml   Output 4385 ml   Net -2783 1 ml       Physical Exam:     Physical Exam    Neurologic Exam   Mental Status:  Oriented to person, place, and situation, able to name president, thought it was 1995, able to name birth year and month   Follows simple commands, some complex commands   Attention and concentration (able to spell WORLD but not backwards, told me there were 15 nickels in a dollar)  Fluent speech  Cranial Nerves:  CN 2: visual fields intact on right diminished peripherally on left (chronic blurriness of vision after cataract surgery in left eye unable to tell me if worse than baseline), blink to threat intact bilaterally, PERRL   CN 3,4,6: right gaze preference, EOM limited to the left and convergence, otherwise full ROM  CN 5: sensation to touch intact bilateral face  CN 7: facial movements equal bilaterally  CN 8: hearing diminished on left   CN 10: uvula movement midline  CN 11: shoulder shrug equal bilaterally   CN 12: tongue midline, no fasciculations   Motor Exam:  Muscle bulk: normal  Overall muscle tone: normal  Strength: 4/5 left arm, 5/5 right arm, 4/5 left leg (able to hold for 5 sec), right leg unable to assess (pain from femoral surgical access site)  Motor: fine movements, normal finger to nose, no dysdiadochokinesia  Sensory Exam:  Light touch intact bilaterally able to identify sidedness   Gait, Coordination, and Reflexes   Reflexes:  Right brachioradialis: 0  Left brachioradialis: 0  Right biceps: +2  Left biceps: +2   Right triceps: 0  Left triceps: 0  Right patellar: +2  Left patellar: +2  Right achilles: 0  Left achilles: 0  Babinski: downward  Gait: unable to assess   Coordination: heel to shin unable to assess     CV: RRR, S1/S2, hear sounds diminished, +2 pulses throughout  Resp: no WOB noted on RA   GI: soft, nondistended     Additional Data:     Labs:    Results from last 7 days   Lab Units 09/20/19  0610   WBC Thousand/uL 14 19*   HEMOGLOBIN g/dL 11 0*   HEMATOCRIT % 34 9   PLATELETS Thousands/uL 372   NEUTROS PCT % 74   LYMPHS PCT % 17   MONOS PCT % 8   EOS PCT % 0     Results from last 7 days   Lab Units 09/20/19  0610  09/19/19  0812   POTASSIUM mmol/L 4 7   < >  --    CHLORIDE mmol/L 116*   < >  --    CO2 mmol/L 21   < >  --    CO2, I-STAT mmol/L  --   --  19*   BUN mg/dL 12   < >  --    CREATININE mg/dL 0 60   < >  --    CALCIUM mg/dL 8 0*   < >  --    GLUCOSE, ISTAT mg/dl  --   --  136    < > = values in this interval not displayed  * I Have Reviewed All Lab Data Listed Above  * Additional Pertinent Lab Tests Reviewed: All Labs Within Last 24 Hours Reviewed    Imaging:    Imaging Reports Reviewed Today Include:   -CT head 9/19: new small regions of patchy hyperdensity in the right occipital lobe and posterior medial right temporal lobe, possibly related to contrast stasis and staining associated with regions of ischemia/infarction  alternatively, small petechial hemorrhages in this region are not excluded but felt to be less likely  -CT head 9/20: 1  Evolving right PCA territory infarction, status post embolectomy post procedure day #1    Vague foci of increased density along the cortical margin, likely representing petechial hemorrhage  2   Cerebral atrophy with chronic small vessel ischemic change  3   Age-indeterminate infarction anterior aspect right hemipelvis  Recent Cultures (last 7 days):           Last 24 Hours Medication List:     Current Facility-Administered Medications:  acetaminophen 650 mg Oral Q6H PRN ADARSH Castillo-C    albuterol 2 5 mg Nebulization Q6H PRN Brionna John MD    amLODIPine 2 5 mg Oral Daily Zuleika Garcia PA-C    aspirin 81 mg Per NG Tube Daily Zakia Lopez MD    atorvastatin 40 mg Oral QPM ADARSH Castillo-ROZINA    cefTRIAXone 1,000 mg Intravenous Q24H Orestes Conway DO Last Rate: Stopped (09/19/19 1501)   heparin (porcine)   Code/Trauma/Sedation Geovani John MD    heparin (porcine) 5,000 Units Subcutaneous [facility] Zakia Lopez MD    metoprolol tartrate 25 mg Oral Q12H [facility] Zuleika Garcia PA-C    metroNIDAZOLE 500 mg Intravenous Q8H Orestes Conway DO Last Rate: Stopped (09/20/19 0400)   niCARdipine 1-15 mg/hr Intravenous Titrated Radha Valenzuela PA-C Last Rate: Stopped (09/19/19 1645)   nitroGLYcerin  Intra-arterial Code/Trauma/Sedation Geovani John MD    PARoxetine 10 mg Oral Daily Sagar Kruger PA-C    sodium chloride 50 mL/hr Intravenous Continuous ADARSH Castillo-ROZINA Last Rate: 50 mL/hr (09/19/19 2250)   verapamil  Intra-arterial Code/Trauma/Sedation Geovani John MD         Today, Patient Was Seen By: Edis Cline    ** Please Note: Dragon 360 Dictation voice to text software may have been used in the creation of this document   **

## 2019-09-20 NOTE — SOCIAL WORK
CM met with pt and her , Vero Laureano, at bedside  CM introduced self/role with dcp  Pt resides with her  in a double wide trailer with 2 YOSVANY  Pt  assists minimally with ADLs otherwise pt independent  Pt uses RW at baseline  Pt does not drive and  transports  Pt is open to [facility] PTA  No hx of STR, MH, or drug/alcohol abuse  Pharmacy is [facility]  Veor Laureano reports he is pt POA and their daughter, Santo Aleman, is secondary  Pt  reports pt has LW  CM reviewed PT/OT recommendations for STR  Pt  agreeable  CM provided both SNF and Acute rehab lists   reports pt does NOT have a secondary insurance  A post acute care recommendation was made by your care team for STR  Discussed Freedom of Choice with both patient and caregiver  List of facilities given to both patient and caregiver via in person  both patient and caregiver aware the list is custom filtered for them by zip code location and that [facility] post acute providers are designated  CM reviewed d/c planning process including the following: identifying help at home, patient preference for d/c planning needs, Discharge Lounge, [facility] Meds to Bed program, availability of treatment team to discuss questions or concerns patient and/or family may have regarding understanding medications and recognizing signs and symptoms once discharged  CM also encouraged patient to follow up with all recommended appointments after discharge  Patient advised of importance for patient and family to participate in managing patients medical well being

## 2019-09-20 NOTE — PHYSICAL THERAPY NOTE
Physical Therapy Evaluation     Patient's Name: Quiana Ndiaye    Admitting Diagnosis  Cerebrovascular accident (CVA) due to embolism of anterior cerebral artery, [facility] [I63 [id number]]    Problem List  Patient Active Problem List   Diagnosis    Chronic low back pain    Chronic lumbar radiculopathy    Idiopathic peripheral neuropathy    Lumbar herniated disc    PTTD (posterior tibial tendon dysfunction)    Spinal stenosis of lumbar region with neurogenic claudication    Acute right PCA stroke [facility])       Past Medical History  Past Medical History:   Diagnosis Date    Asthma     [facility]rial fibrillation ([facility] )     Fibromyalgia     GERD (gastroesophageal reflux disease)     H/O emphysema     Hypercholesterolemia        Past Surgical History  Past Surgical History:   Procedure Laterality Date    CHOLECYSTECTOMY      HYSTERECTOMY            09/20/19 1300   Ambulation/Elevation   Gait pattern   (Unable to initiate side steps)   Balance   Static Sitting Poor +   Dynamic Sitting Poor +   Static Standing Poor   Dynamic Standing Poor -   Ambulatory Poor -   Endurance Deficit   Endurance Deficit Yes   Endurance Deficit Description Fatigue, cognition   Activity Tolerance   Activity Tolerance Patient limited by fatigue   Medical Staff Made Aware OT, CM   Nurse Made Aware Yes Hayder   Assessment   Prognosis Good   Problem List Decreased strength;Decreased range of motion;Decreased endurance; Impaired balance;Decreased mobility; Decreased coordination;Decreased cognition; Impaired judgement;Decreased safety awareness;Pain   Assessment Pt is an 79 yo female presenting [facility]19 with L-sided hemiparesis and right gaze  Imaging demonstrated acute R PCA CVA  Pt is s/p right P1 thrombectomy on 9/19/19  PMH significant for: HLD, Asthma, GERD, Fibromyalgia, Prior CVA in March with residual R-sided weakness and AF  Pt is supine at start of session and agreeable to therapy   Pt presents with impaired cognition requiring increased time for simple tasks  Pt is additionally limited by L sided inattention and visual deficits as well as history of impaired hearing  Pt transferred supine to sit with modA and use of bed rails  Pt presents with L sided lean while seated EOB, requiring verbal cues for correction, unable to self correct  Pt transferred sit <> stand with modA x2 and hand held assist  Pt is retropulsive in standing requiring cues for anterior weight shift and upright posture  Attempted to initiate side steps, pt unable to take steps despite maximal cues for weight shifting  Pt transferred to bedside chair with stand pivot and maxA x2 and hand held assist  Pt remained seated in bedside chair with all needs within reach at end of session  PT recommend inpt rehab to maximize safety and independence with functional mobility  Recommend PMR consult for acute rehab placement  PT to follow   Barriers to Discharge Decreased caregiver support   Goals   Patient Goals To get better   STG Expiration Date 10/04/19   Short Term Goal #1 In 10 sessions pt will: 1  Transfer supine <> sit with Carmencita  2  Transfer sit <> stand with Carmencita and LRAD  3  Sit EOB >20 minutes with supervision for static/dynamic balance  4  Ambulate >20 ft with modA and LRAD  5  Transfer to bedside chair with [address] and [address]  6  Perform LE exercise program   Treatment Day 0   Plan   Treatment/Interventions Functional transfer training;LE strengthening/ROM; Therapeutic exercise; Endurance training;Bed mobility;Gait training;Spoke to nursing;Spoke to case management;Spoke to advanced practitioner;OT;Family   PT Frequency   (3-6x/wk)   Recommendation   Recommendation Post acute IP rehab  (PMR consult for acute rehab placement)   PT - OK to Discharge Yes  (To inpt rehab when medically stable)   Modified Dunbarton Scale   Modified Dunbarton Scale 4   Barthel Index   Feeding 5   Bathing 0   Grooming Score 0   Dressing Score 5   Bladder Score 0   Bowels Score 10   Toilet Use Score 5   Transfers (Bed/Chair) Score 5   Mobility (Level Surface) Score 0   Stairs Score 0   Barthel Index Score 30         Kathrynn Counts, PT, DPT

## 2019-09-20 NOTE — OCCUPATIONAL THERAPY NOTE
[address]  Evaluation     Patient Name: Lele Vigil  UFZNV'S Date: 9/20/2019  Problem List  Principal Problem:    Acute right PCA stroke [facility])    Past Medical History  Past Medical History:   Diagnosis Date    Asthma     Atrial fibrillation ([address] )     Fibromyalgia     GERD (gastroesophageal reflux disease)     H/O emphysema     Hypercholesterolemia      Past Surgical History  Past Surgical History:   Procedure Laterality Date    CHOLECYSTECTOMY      HYSTERECTOMY               09/20/19 1106   Note Type   Note type Eval/Treat   Restrictions/Precautions   Weight Bearing Precautions Per Order No   Other Precautions Cognitive; Chair Alarm; Bed Alarm;Multiple lines;Telemetry; Fall Risk;Pain;Hard of hearing;Visual impairment   Pain Assessment   Pain Assessment 0-10   Pain Score Worst Possible Pain   Pain Type Acute pain   Pain Location Groin   Pain Orientation Right   Pain Descriptors Aching;Discomfort   Pain Frequency Constant/continuous   Pain Onset Ongoing   Clinical Progression Not changed   Patient's Stated Pain Goal No pain   Hospital Pain Intervention(s) Repositioned; Ambulation/increased activity; Emotional support   Response to Interventions tolerated   Home Living   Type of [address] [place] [address] One level; Able to live on main level with bedroom/bathroom; Performs ADLs on one level; Other (Comment)  (2 [address]ANY)   Bathroom Shower/Tub Tub/shower unit   Bathroom Toilet Standard   Bathroom Equipment Grab bars in [facility]   Additional Comments Pt reports living in 1 , 2 [address], 1st floor setup   Prior Function   Level of [place] Needs assistance with ADLs and functional mobility; Needs assistance with IADLs   Lives With Spouse   Receives Help From Family   ADL Assistance Needs assistance   IADLs Needs assistance   Falls in the last 6 months 5 to 10  (6)   Vocational Retired   Comments Pt reports  needing assist w/ ADLS (is mainly independent but reports occasional assist w/ LB dressing/bathing and UB bathing), has assist w/ IADLS (reports helping w/ laundry but spouse assists w/ cooking and cleaning), pt is Mod I w/ transfers and functional mobility w/ use of RW   Lifestyle   Autonomy Assist w/ ADLS, IADLS, Mod I w/ transfers and functional mobility    Reciprocal Relationships Pt lives w/ spouse; also has 2 dghts who are normally busy and not always to assist   Service to Others Pt does not work   Intrinsic Gratification Pt reports she enjoys doing housework   Psychosocial   Psychosocial (WDL) WDL   Length of Time/Family Visitation 16-30 min  (spouse)   ADL   Eating Assistance 5  Supervision/Setup   Grooming Assistance 3  Moderate Assistance   UB Bathing Assistance 3  Moderate Assistance   LB [address] 2  Maximal Assistance   [address] 3  Moderate Assistance    [facility] [address] 2  Maximal [address]  2  Maximal Assistance   Functional Assistance 3  Moderate Assistance   Functional Deficit Steadying;Verbal cueing;Supervision/safety; Increased time to complete   Bed Mobility   Supine to Sit Unable to assess   Sit to Supine Unable to assess   Additional Comments Pt seated up OOB in chair prior to and end of session   Transfers   Sit to Stand 3  Moderate assistance   Additional items Assist x 1; Increased time required;Verbal cues   Stand to Sit 3  Moderate assistance   Additional items Assist x 1; Increased time required;Verbal cues   Additional Comments pt performed sit-stand from chair w/ Mod Ax1 for moderate force production into standing and +VC for safety/proper technique   SBA 2nd for line management, +VC to initiate task   Functional Mobility   Additional Comments Attempted to engage in functional mobility w/ Mod A X2 , HHA provided however pt unable to initiate B/L LE advancement    Balance   Static Sitting Poor +   Dynamic Sitting Poor   Static Standing Poor   Dynamic Standing Poor   Ambulatory Zero   Activity Tolerance   Activity Tolerance Patient limited by fatigue;Patient limited by pain   Medical Staff Made Aware PT    Nurse Made Aware yes, Shmuel Meres Assessment   RUE Assessment X  (grossly 4-/5; impaired dysdiadokinesia; slowed dysmetria)   LUE Assessment   LUE Assessment X  (grossly 3+/5; impaired dysdiadokinesia; slowed dysmetria)   Hand Function   Gross Motor Coordination Impaired   Fine Motor Coordination Functional   Sensation   Light Touch No apparent deficits   Sharp/Dull Not tested   Proprioception   Proprioception No apparent deficits   Vision-Basic Assessment   Current Vision Wears glasses only for reading   Vision - Complex Assessment   Ocular Range of Motion Restricted on the left   Head Position WDL   Tracking Decreased smoothness of horizontal tracking;Decreased smoothness of vertical tracking; Requires cues, head turns, or add eye shifts to track   Saccades Decreased speed of saccadic movement;Decreased speed of pursuit between targets   Visual Fields Difficulty detecting stimulus with OD LUQ; Difficulty detecting stimulus with OD LLQ; Difficulty detecting stimulus with OS LUQ; Difficulty detecting stimulus with OS LLQ   Acuity Able to read clock/calendar on wall without difficulty; Able to read employee name badge without difficulty   Visual Screen Results Left homonymous hemianopsia   Perception   Inattention/Neglect Cues to maintain midline in standing;Cues to maintain midline in sitting   Cognition   Overall Cognitive Status Impaired   Arousal/Participation Responsive; Cooperative   Attention Attends with cues to redirect   Orientation Level Oriented to person;Oriented to place; Disoriented to time;Oriented to situation   Memory Decreased recall of precautions;Decreased recall of recent events;Decreased short term memory   Following Commands Follows one step commands with increased time or repetition   Comments Pt is pleasant and cooperative, requires cues for safety and re-direction to task; has decreased safety awareness and understanding of deficits  Assessment   Limitation Decreased ADL status; Decreased UE ROM; Decreased UE strength;Decreased Safe judgement during ADL;Decreased endurance;Decreased cognition;Visual deficit; Decreased fine motor control;Decreased self-care trans;Decreased high-level ADLs   Prognosis Fair   Assessment Pt is a 81 y/o female seen for OT eval s/p adm to [facility] w/ L sided hemiparesis and R gaze  Pt transferred from [facility] for mechanical thrombectomy  Pt is dx'd w/ acute R PCA stroke  Pt  has a past medical history of Asthma, Atrial fibrillation ([address] ), Fibromyalgia, GERD (gastroesophageal reflux disease), H/O emphysema, and Hypercholesterolemia  Pt with active OT orders and up with assistance  orders  Pt lives with spouse in 1 , 2 [facility]  Pt has some assist w/ ADLS (for LB dressing and UB backside bathing) and assist w/ IADLS (likes to assist as able though), hasn't driven since March (which was pt's last stroke), & required use of DME PTA including a RW and grab bars in the bathroom  Pt is currently demonstrating the following occupational deficits: Mod A UB ADLS, Max A LB ADLS, Mod A x1 for transfers w/ SBA 2nd for safety/line management  Unable to initiate functional mobility at this time  These deficits that are impacting pt's baseline areas of occupation are a result of the following impairments: pain, endurance, activity tolerance, functional mobility, forward functional reach, balance, trunk control, functional standing tolerance, unsupportive home environment, decreased I w/ ADLS/IADLS, strength, ROM, visual deficits, L sided hemiplegia, cognitive impairments, decreased safety awareness, decreased insight into deficits, impaired fine motor skills and coordination deficits  The following Occupational Performance Areas to address include: eating, grooming, bathing/shower, toilet hygiene, dressing, medication management, socialization, health maintenance, functional mobility, community mobility, clothing management, cleaning and household maintenance  Pt scored overall 30/100 on the Barthel Index  Based on the aforementioned OT evaluation, functional performance deficits, and assessments, pt has been identified as a high complexity evaluation  Recommend STR upon D/C, when medically stable  Pt to continue to benefit from acute immediate OT services to address the following goals 3-5x/week to  w/in 10-14 days:    Goals   Patient Goals to get back to helping w/ housework   LTG Time Frame 10-   Long Term Goal #1 see below listed goals   Plan   Treatment Interventions ADL retraining;Functional transfer training;UE strengthening/ROM; Endurance training;Visual perceptual retraining;Cognitive reorientation;Patient/family training;Equipment evaluation/education; Fine motor coordination activities; Compensatory technique education; Neuromuscular reeducation;Continued evaluation; Energy conservation; Activityengagement   Goal Expiration Date 10/04/19   OT Frequency 3-5x/wk   Recommendation   OT Discharge Recommendation Short Term Rehab   OT - OK to Discharge Yes  (when medically stable)   Barthel Index   Feeding 5   Bathing 0   Grooming Score 0   Dressing Score 5   Bladder Score 0   Bowels Score 10   Toilet Use Score 5   Transfers (Bed/Chair) Score 5   Mobility (Level Surface) Score 0   Stairs Score 0   Barthel Index Score 30   Modified Canadian Scale   Modified Canadian Scale 4     GOALS  1) Pt will improve activity tolerance to G for min 30 min txment sessions  2) Pt will complete ADLs/self care w/ Min A w/ use of AD/DME as needed to increase independence in functional tasks  3) Pt will complete toileting w/ Min A w/ G hygiene/thoroughness using DME PRN  4) Pt will improve fx'l tfers on/off all surfaces using DME PRN w/ G balance/safety including toileting w/ S  5) Pt will engage be attentive 100% of the time during ongoing cognitive assessment w/ G participation to A w/ safe d/c planning/recommendations  6) Pt will demonstrate G carryover of pt/caregiver education and training as appropriate w/o cues w/ G tolerance to increase safety during functional tasks  7) Pt will engage in depression screen/leisure interest checklist w/ G participation to monitor s/s depression and ID 3 positive coping strategies to A w/ emotional regulation and management  8) Pt will improve UB fx'l use/ROM to [facility] and strength 1/2 MMT via AROM/AAROM/PROM in all planes as tolerated s/p skilled education of HEP w/o cues for carryover  9) Pt will tolerate mod manual NDT facilitation t/o hemibody during fx'l I/ADL/leisure tasks w/ mod I to improve fx'l engagement increase neuroplastic recovery  10) Pt will engage in ongoing  assessments, screens, and activities t/o fx'l I/ADL/leisure tasks w/ G participation to A w/ adaptation and accomodations or rule out visual perceptual impairments      Do Dennis MS, OTR/L

## 2019-09-20 NOTE — PROGRESS NOTES
Progress Note - Critical Care   Claudean Gaunt 80 y o  female MRN: [id number]  Unit/Bed#: ICU 04 Encounter: [id number]    Assessment:   Principal Problem:    Acute right PCA stroke [facility])  Resolved Problems:    * No resolved hospital problems  *      Plan  Neuro:   1) Acute right PCA CVA s/p R   PCA thrombectomy, previous stroke with residual deficits, anxiety  · GCS 14 : E4, V4, M6  · Neurology and nsgy following  · MRI pending  · Space out neuro checks to q4 (from q1)  · Pain controlled with:  · PRN: tylenol  · Delirium Precautions  · CAM ICU per protocol  · Regulate sleep/wake cycle+  · Trend neuro exam  · Continue home paxil    CV:   1) HTN, A-fib  · Continue norvasc 2 5mg  · Continue statin  · Continue lopressor 25mg BID  · Continue daily ASA  · Has been off AC x2 weeks 2/2 hemoptysis  · Hemodynamic infusions: None  · MAP goal > 65  · SBP goal <692  · Systolic (85BHB), HFA:627 , Min:149 , Max:149   ·   · Rhythm: NSR in 70s  · Follow rhythm on telemetry    Lung:   · Pulmonary hygiene  · Encourage deep breathing and coughing    GI:   · Stress ulcer prophylaxis: No prophylaxis needed  · Bowel regimen: prn    FEN:   · Fluid/Diuretic plan: IVF for gently hydration- will d/c when ok for diet  · Nutrition/diet plan: pending SLP eval  · Replete electrolytes with goals: K >4 0, Mag >2 0, and Phos >3 0    :   · Indwelling [place] present: no   · Trend UOP and BUN/creat  · Strict I and O    Intake/Output Summary (Last 24 hours) at 2019 0625  Last data filed at 2019 8862  Gross per 24 hour   Intake 1601 9 ml   Output 4385 ml   Net -2783 1 ml   ·     ID:   1) Aspiration pneumonia  · Abx ordered: ceftriaxone and metronidazole (Day 2/)  · Trend temps and WBC count  · Temp (24hrs), Av 5 °F (36 4 °C), Min:94 3 °F (34 6 °C), Max:100 °F (37 8 °C)  ·     Heme:   · Trend hgb and plts  · Transfuse as needed for goal hgb >7 0    Endo:   · Glycemic control plan: Blood glucose controlled on current regimen   · Q6 accucheks while NPO    MSK/Skin:  · PMR consult  · Mobility goal: early mobility as able, OOB, ambulate  · PT consult: yes  · OT consult: yes  · Frequent turning and pressure off-loading    Lines:  · Central venous access: triple lumen catheter - 16 cm  · No longer needed  Will place order to discontinue  · Arterial line: Assessed  No longer needed  Order will be discontinued  VTE Prophylaxis:  · Pharmacologic Prophylaxis:Heparin  · Mechanical Prophylaxis: sequential compression device    Disposition: Transfer to telemetry      ______________________________________________________________________  Chief Complaint: "I am having some ear pain where they put that thing"      HPI/24hr events: Overnight, patient in and out of rapid a-fib with HR in 130s-150s  Received her home dose of Toprol XL in the afternoon  Electrolytes checked and repleted  Pt maintained in NSR in 70s-80s for rest of night  Review of Systems   Constitutional: Negative for chills, diaphoresis, fatigue and fever  HENT: Positive for ear pain  Eyes: Negative for pain and visual disturbance  Respiratory: Negative for cough, shortness of breath and wheezing  Cardiovascular: Positive for palpitations  Negative for chest pain  Gastrointestinal: Negative for abdominal distention, abdominal pain, constipation, diarrhea, nausea and vomiting  Musculoskeletal: Negative for back pain, neck pain and neck stiffness     Neurological: Negative for dizziness, facial asymmetry, weakness, light-headedness, numbness and headaches      ______________________________________________________________________  Temperature:   Temp (24hrs), Av 5 °F (36 4 °C), Min:94 3 °F (34 6 °C), Max:100 °F (37 8 °C)    Current Temperature: 99 3 °F (37 4 °C)    Vitals:    19 0201 19 0206 19 0217 19 0600   BP:       Pulse: 72 72 68    Resp: (!) 25 (!) 31 (!) 28    Temp: 99 7 °F (37 6 °C) 99 7 °F (37 6 °C) 99 3 °F (37 4 °C)    TempSrc:       SpO2: 97% 97% 97%    Weight:    76 7 kg (169 lb 1 5 oz)     Arterial Line BP: 120/40  Arterial Line MAP (mmHg): 68 mmHg     Weights:   IBW: -92 5 kg    Body mass index is 28 14 kg/m²  Weight (last 2 days)     Date/Time   Weight    09/20/19 0600   76 7 (169 09)               Hemodynamic Monitoring:  N/A     Noninvasive/Ventilator Settings:  Respiratory    Lab Data (Last 4 hours)    None         O2/Vent Data (Last 4 hours)    None              No results found for: PHART, KXF1RAM, PO2ART, TEM6LXM, V0VBSIZJ, BEART, SOURCE  SpO2: SpO2: 94 %, SpO2 Activity: SpO2 Activity: At Rest, SpO2 Device: O2 Device: Nasal cannula  ______________________________________________________________________  Physical Exam:     Physical Exam   Constitutional: She appears well-developed and well-nourished  HENT:   Head: Normocephalic and atraumatic  Mouth/Throat: Oropharynx is clear and moist    Eyes: Pupils are equal, round, and reactive to light  Conjunctivae and EOM are normal    Possible right gaze preference   Neck: Normal range of motion  Neck supple  Cardiovascular: Normal rate, regular rhythm, normal heart sounds and intact distal pulses  Pulmonary/Chest: Effort normal and breath sounds normal    Abdominal: Soft  Bowel sounds are normal  She exhibits no distension  There is no tenderness  There is no guarding  Musculoskeletal:   RUE 5/5, LUE 4/5, RLE 3/5, LLE 4/5  Neurological: She is alert  Patient immediately oriented to person and place  Oriented to year/season with some additional cueing  Skin: Skin is warm and dry  Capillary refill takes less than 2 seconds  She is not diaphoretic      ______________________________________________________________________  Intake and Outputs:  I/O       09/18 0701 - 09/19 0700 09/19 0701 - 09/20 0700    P  O   200    I V  (mL/kg)  1251 9 (16 3)    NG/GT  0    IV Piggyback  150    Total Intake(mL/kg)  1601 9 (20 9)    Urine (mL/kg/hr)  3985    Emesis/NG output  200    Blood  200    Total Output  4385    Net  -7615  1               Nutrition:        Diet Orders   (From admission, onward)             Start     Ordered    09/19/19 1041  Diet NPO  Diet effective now     Question Answer Comment   Diet Type NPO    RD to adjust diet per protocol? No        09/19/19 1048              Labs:   Results from last 7 days   Lab Units 09/19/19  1137 09/19/19  0812   WBC Thousand/uL 13 42*  --    HEMOGLOBIN g/dL 11 4*  --    I STAT HEMOGLOBIN g/dl  --  10 9*   HEMATOCRIT % 36 0  --    HEMATOCRIT, ISTAT %  --  32*   PLATELETS Thousands/uL 340  --    NEUTROS PCT % 82*  --    MONOS PCT % 8  --     Results from last 7 days   Lab Units 09/20/19  0022 09/19/19  1137 09/19/19  0812   SODIUM mmol/L  --  139  --    POTASSIUM mmol/L 3 2* 3 4*  --    CHLORIDE mmol/L  --  114*  --    CO2 mmol/L  --  19*  --    CO2, I-STAT mmol/L  --   --  19*   BUN mg/dL  --  17  --    CREATININE mg/dL  --  0 61  --    CALCIUM mg/dL  --  7 1*  --    GLUCOSE, ISTAT mg/dl  --   --  136     Results from last 7 days   Lab Units 09/20/19  0022 09/19/19  1137   MAGNESIUM mg/dL 1 8 1 8     Results from last 7 days   Lab Units 09/20/19  0022 09/19/19  1137   PHOSPHORUS mg/dL 2 2* 2 6              No results found for: TROPONINI  Imaging:  I have personally reviewed pertinent reports  and I have personally reviewed pertinent films in PACS  Micro:  No results found for: Kd Reyes SPUTUMCULTUR  Allergies:    Allergies   Allergen Reactions    Ciprofloxacin     Penicillins      Other reaction(s): Itching     Medications:   Scheduled Meds:  Current Facility-Administered Medications:  acetaminophen 650 mg Oral Q6H PRN Woo Arthur PA-C    albuterol 2 5 mg Nebulization Q6H PRN Gertrude Zimmerman MD    amLODIPine 2 5 mg Oral Daily Zuleika Garcia PA-C    aspirin 81 mg Per NG Tube Daily Dania Hernandez MD    atorvastatin 40 mg Oral QPM Woo Arthur PA-C    cefTRIAXone 1,000 mg Intravenous Q24H DO Renato Ward Rate: Stopped (09/19/19 1501)   heparin (porcine)   Code/Trauma/Sedation Geovani Burden MD    heparin (porcine) 5,000 Units Subcutaneous [facility] Luther Chan MD    metoprolol tartrate 25 mg Oral Q12H [address] Zuleika Garcia PA-C    metroNIDAZOLE 500 mg Intravenous Q8H Pennie Ocampo DO Last Rate: Stopped (09/19/19 2054)   niCARdipine 1-15 mg/hr Intravenous Titrated Ingrid Scott PA-C Last Rate: Stopped (09/19/19 1645)   nitroGLYcerin  Intra-arterial Code/Trauma/Sedation Geovani Burden MD    PARoxetine 10 mg Oral Daily Sagar Kruger PA-C    potassium chloride 40 mEq Intravenous Once Rhys Boykin MD Last Rate: 40 mEq (09/20/19 0430)   sodium chloride 50 mL/hr Intravenous Continuous Melissa Reyes PA-C Last Rate: 50 mL/hr (09/19/19 2250)   verapamil  Intra-arterial Code/Trauma/Sedation Geovani Burden MD      Continuous Infusions:  niCARdipine 1-15 mg/hr Last Rate: Stopped (09/19/19 1645)   sodium chloride 50 mL/hr Last Rate: 50 mL/hr (09/19/19 2250)     PRN Meds:    acetaminophen 650 mg Q6H PRN   albuterol 2 5 mg Q6H PRN   heparin (porcine)  Code/Trauma/Sedation Med   nitroGLYcerin  Code/Trauma/Sedation Med   verapamil  Code/Trauma/Sedation Med       Invasive lines and devices: Invasive Devices     Central Venous Catheter Line            CVC Central Lines 09/19/19 Triple less than 1 day          Peripheral Intravenous Line            Peripheral IV 09/19/19 Left Hand less than 1 day          Arterial Line            Arterial Line 09/19/19 Left Radial less than 1 day          Drain            NG/OG Tube Orogastric Center mouth less than 1 day    Urethral Catheter Non-latex less than 1 day                   Counseling / Coordination of Care  Total Critical Care time spent 37 minutes excluding procedures, teaching and family updates  Code Status: Level 1 - Full Code    Portions of the record may have been created with voice recognition software    Occasional wrong word or "sound a like" substitutions may have occurred due to the inherent limitations of voice recognition software  Read the chart carefully and recognize, using context, where substitutions have occurred      Jeffrey Dixon PA-C

## 2019-09-20 NOTE — PROGRESS NOTES
09/20/19 1500   Clinical Encounter Type   Visited With Patient and family together   Routine Visit Introduction   Referral From Physician   Referral To [facility] Text   Family Spiritual Encounters   Family Coping Accepting;Open/discussion

## 2019-09-20 NOTE — PLAN OF CARE
Problem: PHYSICAL THERAPY ADULT  Goal: Performs mobility at highest level of function for planned discharge setting  See evaluation for individualized goals  Description  Treatment/Interventions: Functional transfer training, LE strengthening/ROM, Therapeutic exercise, Endurance training, Bed mobility, Gait training, Spoke to nursing, Spoke to case management, Spoke to advanced practitioner, OT, Family          See flowsheet documentation for full assessment, interventions and recommendations  Note:   Prognosis: Good  Problem List: Decreased strength, Decreased range of motion, Decreased endurance, Impaired balance, Decreased mobility, Decreased coordination, Decreased cognition, Impaired judgement, Decreased safety awareness, Pain  Assessment: Pt is an 79 yo female [facility] [place] [facility] with L-sided hemiparesis and right gaze  Imaging demonstrated acute R PCA CVA  Pt is s/p right P1 thrombectomy on 9/19/19  PMH significant for: HLD, Asthma, GERD, Fibromyalgia, Prior CVA in March with residual R-sided weakness and AF  Pt is supine at start of session and agreeable to therapy  Pt presents with impaired cognition requiring increased time for simple tasks  Pt is additionally limited by L sided inattention and visual deficits as well as history of impaired hearing  Pt transferred supine to sit with modA and use of bed rails  Pt presents with L sided lean while seated EOB, requiring verbal cues for correction, unable to self correct  Pt transferred sit <> stand with modA x2 and hand held assist  Pt is retropulsive in standing requiring cues for anterior weight shift and upright posture  Attempted to initiate side steps, pt unable to take steps despite maximal cues for weight shifting  Pt transferred to bedside chair with stand pivot and maxA x2 and hand held assist  Pt remained seated in bedside chair with all needs within reach at end of session   PT recommend inpt rehab to maximize safety and independence with functional mobility  Recommend PMR consult for acute rehab placement  PT to follow  Barriers to Discharge: Decreased caregiver support     Recommendation: (S) Post acute IP rehab(PMR consult for acute rehab placement)     PT - OK to Discharge: (S) Yes(To inpt rehab when medically stable)    See flowsheet documentation for full assessment        Niyah Wolff, PT, DPT

## 2019-09-20 NOTE — ASSESSMENT & PLAN NOTE
· POD 1 from right P1 thrombectomy  · Imaging reviewed personally and with attending neurosurgeon  Interpretations follows:  · CT head 09/19/2019:  New small regions of Pap she hyperdensity in the right occipital lobe and posterior medial right temporal lobe related to contrast   Moderate chronic microangiopathy  · CT head 09/20/2019:  Evolving right PCA territory infarct, status post embolectomy  No evidence of acute hemorrhage at this time  Cerebral atrophy  Age indeterminate hypodensity anterior aspect right hemipelvis  · Distal pulses intact, groin and radial dressing removed  Some ecchymosis overlying right groin  No active bleeding at this time  · No further acute neurosurgical intervention at this time  · Recommend Neurology consultation management for stroke workup  · Would continue to recommend MRI brain  · Per discussion with Dr Tashi Richardson, radiologic readings of petechial hemorrhage not accurate  No acute intracranial abnormalities  Patient cleared from neurosurgical perspective for DVT prophylaxis, anti-platelet, anticoagulation medication per neurology discretion  · Neurosurgery will sign off and follow up as needed during remainder patient's hospitalization and outpatient setting  Call with any questions or concerns

## 2019-09-20 NOTE — PROGRESS NOTES
Patient was very anxious and stated her heart rate was elevated  She said that noone was coming to help her although various staff had already been in her room and she did not remember  She also state she was being held captive  She asked that her  be called via phone   drove over an hour and  [facility] to see his wife post extubation   was in wheelchair from ER due to his lumbar stenosis  Emotionally helpful at bedside with his wife  Patient told me her name and would like to be addressed as Mrs Dannielle Cline  Patient appeared very confused, slightly demanding and stated the year as 1969 and thought the place was [facility] [place]  Right groin site is soft, nontender  >MRI was contacted for time slot however they were very busy and had to prioritize preop patients requiring MRI prior to surgery  No MRI scheduling availability tonight as per MRI tech

## 2019-09-20 NOTE — PROGRESS NOTES
Progress Note - ICU Transfer to SD/MS tele   Loraine Jeffries 80 y o  female MRN: [id number]  [address]   Unit/Bed#: ICU 04 Encounter: [id number]    Code Status: Level 1 - Full Code  POA:    POLST:      Reason for ICU admission: R  PCA stroke s/p endovascular intervention    Active problems:   Principal Problem:    Acute right PCA stroke ([address] )  Resolved Problems:    * No resolved hospital problems  *      Consultants: Neurosurgery, neurology, PMR    History of Present Illness: "Loraine Jeffries is a 80 y o  female with HLD, Asthma, GERD, Fibromyalgia,  Prior CVA in March with residual R-sided weakness and AF off A/C two weeks ago due to hemoptysis who was found with L-sided hemiparesis and right gaze  Last known normal was 9:30 PM yesterday  The patient was transferred from [facility] for mechanical thrombectomy  During transfer patient aspirated 4x per transport  "    Summary of clinical course: Patient underwent P1 mechanical thrombectomy and was transferred to ICU for close neurological monitoring  Patient extubated POD 0 in the afternoon without issue  Patient did have episodes of A-fib with RVR overnight from 9/19-9/20  Electrolyte repleted and home metoprolol restarted and patient has been NSR since  This AM, improvement in L  Sided strength 4/5  Still a R  Gaze preference  Oriented to person and place, but required some cueing for time  Repeat CT this AM stable with some evolution of stroke  No petechial hemorrhage identified by neurosurgery or ICU team  Kept on abx for suspected aspiration pneumonia  Stable for transfer to floor  Recent or scheduled procedures: R  P1 thrombectomy    Outstanding/pending diagnostics: MRI brain    Cultures:              Mobilization Plan: ambulate and OOB as able, PT/OT    Nutrition Plan:          Diet Orders   (From admission, onward)             Start     Ordered    09/20/19 1055  Diet Dysphagia/Modified Consistency;  Dysphagia 3-Dental Soft; Thin Liquid  Diet effective now     Question Answer Comment   Diet Type Dysphagia/Modified Consistency    Dysphagia/Modified Consistency Dysphagia 3-Dental Soft    Liquid Modifier Thin Liquid    RD to adjust diet per protocol? No        09/20/19 1054                Discharge Plan:   Patient should be ready for discharge to [facility] following diagnostics and medical stabilization  Initial Physical Therapy Recommendations: pending  Initial Occupational Therapy Recommendations: pending  Initial /Plan: pending    Home medications that are not reordered and reason why: plavix (can restart at discretion of neurology)     Specific Diagnosis Plan:    R  PCA stroke s/p endovascular intervention  - Neurosurgery and neurology following  - Continue on stroke pathway  - Q4hr neuro checks  - MRI pending  - Continue home paxil  - Continue statin and aspirin  - PT/OT    HTN, A-fib  - Continue home norvasc  - continue lopressor 25mg BID  - Started on heparin gtt (ACS low) for anticoagulation with A-fib (ok per neuro and neurosurgery)    Aspiration pneumonia/pneumonitis  - 5 day course total for ceftriaxone and flagyl      Spoke with Dr Jeanette Buck  regarding transfer  Please call [phone] with any questions or concerns  Portions of the record may have been created with voice recognition software  Occasional wrong word or "sound a like" substitutions may have occurred due to the inherent limitations of voice recognition software  Read the chart carefully and recognize, using context, where substitutions have occurred      Santana Brown PA-C

## 2019-09-20 NOTE — PLAN OF CARE
Problem: OCCUPATIONAL THERAPY ADULT  Goal: Performs self-care activities at highest level of function for planned discharge setting  See evaluation for individualized goals  Description  Treatment Interventions: ADL retraining, Functional transfer training, UE strengthening/ROM, Endurance training, Visual perceptual retraining, Cognitive reorientation, Patient/family training, Equipment evaluation/education, Fine motor coordination activities, Compensatory technique education, Neuromuscular reeducation, Continued evaluation, Energy conservation, Activityengagement          See flowsheet documentation for full assessment, interventions and recommendations  Note:   Limitation: Decreased ADL status, Decreased UE ROM, Decreased UE strength, Decreased Safe judgement during ADL, Decreased endurance, Decreased cognition, Visual deficit, Decreased fine motor control, Decreased self-care trans, Decreased high-level ADLs  Prognosis: Fair  Assessment: Pt is a 81 y/o female seen for OT eval s/p adm to [facility] w/ L sided hemiparesis and R gaze  Pt transferred from [address] [place] [address] for mechanical thrombectomy  Pt is dx'd w/ acute R PCA stroke  Pt  has a past medical history of Asthma, Atrial fibrillation ([address] ), Fibromyalgia, GERD (gastroesophageal reflux disease), H/O emphysema, and Hypercholesterolemia  Pt with active OT orders and up with assistance  orders  Pt lives with spouse in 1 , [facility]  Pt has some assist w/ ADLS (for LB dressing and UB backside bathing) and assist w/ IADLS (likes to assist as able though), hasn't driven since March (which was pt's last stroke), & required use of DME PTA including a RW and grab bars in the bathroom  Pt is currently demonstrating the following occupational deficits: Mod A UB ADLS, Max A LB ADLS, Mod A x1 for transfers w/ SBA 2nd for safety/line management  Unable to initiate functional mobility at this time   These deficits that are impacting pt's baseline areas of occupation are a result of the following impairments: pain, endurance, activity tolerance, functional mobility, forward functional reach, balance, trunk control, functional standing tolerance, unsupportive home environment, decreased I w/ ADLS/IADLS, strength, ROM, visual deficits, L sided hemiplegia, cognitive impairments, decreased safety awareness, decreased insight into deficits, impaired fine motor skills and coordination deficits  The following Occupational Performance Areas to address include: eating, grooming, bathing/shower, toilet hygiene, dressing, medication management, socialization, health maintenance, functional mobility, community mobility, clothing management, cleaning and household maintenance  Pt scored overall 30/100 on the Barthel Index  Based on the aforementioned OT evaluation, functional performance deficits, and assessments, pt has been identified as a high complexity evaluation  Recommend STR upon D/C, when medically stable   Pt to continue to benefit from acute immediate OT services to address the following goals 3-5x/week to  w/in 10-14 days:      OT Discharge Recommendation: Short Term Rehab  OT - OK to Discharge: Yes(when medically stable)  Abel Bhakta MS, OTR/L

## 2019-09-20 NOTE — PROGRESS NOTES
Progress Note - Della Cabrera 1934, 80 y o  female MRN: [id number]    Unit/Bed#: ICU 04 Encounter: [id number]    Primary Care Provider: Martha Mcardle, MD   Date and time admitted to hospital: 9/19/2019  7:32 AM    * Acute right PCA stroke ([facility] [address] )  Assessment & Plan  · POD 1 from right P1 thrombectomy  · Imaging reviewed personally and with attending neurosurgeon  Interpretations follows:  · CT head 09/19/2019:  New small regions of Pap she hyperdensity in the right occipital lobe and posterior medial right temporal lobe related to contrast   Moderate chronic microangiopathy  · CT head 09/20/2019:  Evolving right PCA territory infarct, status post embolectomy  No evidence of acute hemorrhage at this time  Cerebral atrophy  Age indeterminate hypodensity anterior aspect right hemipelvis  · Distal pulses intact, groin and radial dressing removed  Some ecchymosis overlying right groin  No active bleeding at this time  · No further acute neurosurgical intervention at this time  · Recommend Neurology consultation management for stroke workup  · Would continue to recommend MRI brain  · Per discussion with Dr Brenda Myers, radiologic readings of petechial hemorrhage not accurate  No acute intracranial abnormalities  Patient cleared from neurosurgical perspective for DVT prophylaxis, anti-platelet, anticoagulation medication per neurology discretion  · Neurosurgery will sign off and follow up as needed during remainder patient's hospitalization and outpatient setting  Call with any questions or concerns  Subjective/Objective   Chief Complaint: "I dont know"     Subjective: Patient states she does not know how she is doing today because she has not gotten up yet  Patient still laying in bed, but in no acute distress  Patient denies any headaches, trouble with speech, dizziness  Patient does appear to have some left temporal visual difficulties  No sensation changes or new weakness in legs  Objective: laying in bed in NAD  I/O       09/18 0701 - 09/19 0700 09/19 0701 - 09/20 0700 09/20 0701 - 09/21 0700    P  O   200     I V  (mL/kg)  1251 9 (16 3)     NG/GT  0     IV Piggyback  150     Total Intake(mL/kg)  1601 9 (20 9)     Urine (mL/kg/hr)  3985     Emesis/NG output  200     Blood  200     Total Output  4385     Net  -2783  1                  Invasive Devices     Central Venous Catheter Line            CVC Central Lines 09/19/19 Triple 1 day          Peripheral Intravenous Line            Peripheral IV 09/19/19 Left Hand 1 day          Arterial Line            Arterial Line 09/19/19 Left Radial 1 day          Drain            NG/OG Tube Orogastric Center mouth 1 day    Urethral Catheter Non-latex 1 day                Physical Exam:  Vitals: Blood pressure (!) 149/45, pulse 78, temperature 99 °F (37 2 °C), resp  rate (!) 30, weight 76 7 kg (169 lb 1 5 oz), SpO2 93 %  ,Body mass index is 28 14 kg/m²  Hemodynamic Monitoring: MAP: Arterial Line MAP (mmHg): 88 mmHg     General appearance: alert, appears stated age, cooperative and no distress  Head: Normocephalic, without obvious abnormality, atraumatic  Eyes: EOMI on exam with following finger, PERRL  Neck: supple, symmetrical, trachea midline and NT  Lungs: non labored breathing  Heart: regular heart rate  Ext: right groin and right wrist dressings removed  Some groin ecchymosis and pain inhibition with movement, but no evidence of active bleeding at either site  Distal pulses intact  LLE warm to touch  Neurologic:   Mental status: Alert, oriented name and place  Patient unable to state year of birth, or date, but was oriented to year  Cranial nerves: grossly intact (Cranial nerves II-XII) except slight decreased vision in patients left temporal field  Sensory: normal to light touch   Motor: moving all extremities spontaneously  Good strength throughout except with HF on right 2/2 to pain  Coordination: very slight left drift   Slight abnormality with finger to nose on left  Lab Results:  Results from last 7 days   Lab Units 09/20/19  0610 09/19/19  1137 09/19/19  0812   WBC Thousand/uL 14 19* 13 42*  --    HEMOGLOBIN g/dL 11 0* 11 4*  --    I STAT HEMOGLOBIN g/dl  --   --  10 9*   HEMATOCRIT % 34 9 36 0  --    HEMATOCRIT, ISTAT %  --   --  32*   PLATELETS Thousands/uL 372 340  --    NEUTROS PCT % 74 82*  --    MONOS PCT % 8 8  --      Results from last 7 days   Lab Units 09/20/19  0610 09/20/19  0022 09/19/19  1137 09/19/19  0812   POTASSIUM mmol/L 4 7 3 2* 3 4*  --    CHLORIDE mmol/L 116*  --  114*  --    CO2 mmol/L 21  --  19*  --    CO2, I-STAT mmol/L  --   --   --  19*   BUN mg/dL 12  --  17  --    CREATININE mg/dL 0 60  --  0 61  --    CALCIUM mg/dL 8 0*  --  7 1*  --    GLUCOSE, ISTAT mg/dl  --   --   --  136     Results from last 7 days   Lab Units 09/20/19  0610 09/20/19  0022 09/19/19  1137   MAGNESIUM mg/dL 2 4 1 8 1 8     Results from last 7 days   Lab Units 09/20/19  0022 09/19/19  1137   PHOSPHORUS mg/dL 2 2* 2 6         No results found for: TROPONINT  ABG:No results found for: PHART, EMX5MAV, PO2ART, LTO7ATC, F1ZBGUBA, BEART, SOURCE    Imaging Studies: I have personally reviewed pertinent reports  and I have personally reviewed pertinent films in PACS  Xr Chest Portable    Result Date: 9/19/2019  Impression: Right jugular central line tip in satisfactory position  No pneumothorax  Cardiomegaly without vascular congestion  Scattered opacities in both lungs presumably atelectatic although infiltrates are not excluded by this x-ray  Follow-up as clinically appropriate  Workstation performed: [id number]     Ct Head Wo Contrast    Result Date: 9/20/2019  Impression: 1  Evolving right PCA territory infarction, status post embolectomy post procedure day #1  Vague foci of increased density along the cortical margin, likely representing petechial hemorrhage  2   Cerebral atrophy with chronic small vessel ischemic change   3  Age-indeterminate infarction anterior aspect right hemipelvis  Follow-up MRI of the brain is recommended for further evaluation  On the preprocedure chest radiograph, however there was an electronic device overlying the superior mediastinum  There are no leads which appear to be attached to the underlying heart to  suggest a pacing device  This does not have a typical appearance for a spinal stimulator orotracheal nerve stimulator  Recommend follow-up chest radiograph prior to MRI  The study was marked in [facility] for immediate notification  Workstation performed: [id number]     Ct Head Wo Contrast    Result Date: 9/19/2019  Impression: 1  New small regions of patchy hyperdensity in the right occipital lobe and posterior medial right temporal lobe, possibly related to contrast stasis and staining associated with regions of ischemia/infarction  Alternatively, small petechial hemorrhages in this region are not excluded but felt to be less likely  Continued clinical and imaging surveillance recommended  3   Moderate, chronic microangiopathy  Workstation performed: [id number]     Ct Cerebral Perfusion    Result Date: 9/19/2019  Impression: CT perfusion performed  Data available on PACS  Workstation performed: [id number]     Xr Chest Portable Icu    Result Date: 9/20/2019  Impression: 1  Increased diffuse interstitial opacities 2  Likely small left pleural effusion with adjacent basal opacity, atelectasis versus pneumonia Workstation performed: [id number]       EKG, Pathology, and Other Studies: I have personally reviewed pertinent reports  VTE Pharmacologic Prophylaxis: cleared for DVT ppx       VTE Mechanical Prophylaxis: sequential compression device

## 2019-09-20 NOTE — SPEECH THERAPY NOTE
Speech Language/Pathology  Speech/Language Pathology  Assessment    Patient Name: Marcus Goodwin  Today's Date: 9/20/2019     Problem List  Principal Problem:    Acute right PCA stroke [facility])    Past Medical History  Past Medical History:   Diagnosis Date    Asthma     Atrial fibrillation ([address] )     Fibromyalgia     GERD (gastroesophageal reflux disease)     H/O emphysema     Hypercholesterolemia      Past Surgical History  Past Surgical History:   Procedure Laterality Date    CHOLECYSTECTOMY      HYSTERECTOMY     80 y o  female with HLD, Asthma, GERD, Fibromyalgia,  Prior CVA in March with residual R-sided weakness and AF off A/C two weeks ago due to hemoptysis who was found with L-sided hemiparesis and right gaze  Last known normal was 9:30 PM yesterday  The patient was transferred from [facility] for mechanical thrombectomy  During transfer patient aspirated 4x per transport  CXR-1  Increased diffuse interstitial opacities  2  Likely small left pleural effusion with adjacent basal opacity, atelectasis versus pneumonia  CT head-IMPRESSION:  1  Evolving right PCA territory infarction, status post embolectomy post procedure day #1  Vague foci of increased density along the cortical margin, likely representing petechial hemorrhage  2   Cerebral atrophy with chronic small vessel ischemic change  3   Age-indeterminate infarction anterior aspect right hemipelvis  Bedside Swallow Evaluation:    Summary:  Pt presents w/ mild oral dysphagia w/ reduced manipulation of the harder solids, prolonged transfers  Pt w/ need for liquids to clear, no overt s/s aspiration otherwise noted  Pt states she has severe heartburn at home & takes tums & baking soda for it  Recommendations:  Diet: begin level 3  Liquid: thin  Meds: as tolerated  Supervision: please assist  Positioning:Upright  Strategies: Pt to take PO/Meds only when fully alert and upright     Oral care: frequently  Aspiration precautions  Reflux precautions    Therapy Prognosis: good  Prognosis considerations: pt support system, pt willingness  Frequency:3-5 as able to upgrade to regular      Patient's goal: I hope I can go back to walking, I loved it    Reason for consult:  R/o aspiration  Determine safest and least restrictive diet  New neuro event    Current diet:  npo  Premorbid diet[de-identified]  Regular w/ thin  Previous VBS:  -  O2 requirement:  NC  Voice/Speech:  Monotonous, mild hoarseness  Intelligible & conversational   Social:  Home w/   Follows commands: For oral mech, noted L neglect, will look passed midline w/ cues     Cognitive Status:  Alert, oriented to hospital  Not city  Oral mech exam:  Dentition: partial natural, upper partials   Labial strength and ROM: reduced min upper lip on R  Lingual strength and ROM: reduced lateralizations   Mandibular strength and ROM: fair  Secretion management: wfl  Volitional cough: fair  Volitional swallow: present    Items administered:  Puree, soft solid, hard solid,  thin liquids,  Liquids were taken by straw/cup  Oral stage:  Lip closure: wfl  Mastication: prolonged  Bolus formation: reduced at least mildly  Bolus control: fair  Transfer: mildly reduced  Oral residue: mild, needed sips & 2* swallows to clear    Pharyngeal stage:  Swallow promptness: fairly prompt  Laryngeal rise: wfl, min reduced   Wet voice: none  Throat clear: none  Cough: none  Secondary swallows: periodically    Esophageal stage:  H/o GERD  Pt reports needing max tums, baking soday  Aspiration precautions posted    Results d/w:  Pt, nursing, family  Goal(s):  Pt will tolerate least restrictive diet w/out s/s aspiration or oral/pharyngeal difficulties

## 2019-09-20 NOTE — PROGRESS NOTES
Patient in rapid atrial fibrillation on/off since 9/19 @ 1650 with   Patient was given 5 mg IV Lopressor as per daytime RN and and 10 mg Cardizem IV  Patient back to plus 100's 9/19 17:05 with continued 's 18:53 and sustained  Patient was given her 25 mg Lopressor oral dose 9/19 20:24  HR decreased to 66 9/20 to HR 66 and sustained lower heart rate  Urine output today was -3000 cc  Electrolyte differentiation earlier  Rechecking electrolytes as per [facility] Resident Dr Silver Rubio  Patient's electrolytes K 3 2, Mg 1 8, Phos 2 2 and Ionized Calcium 1 02  Potassium, Magnesium and Calcium ordered

## 2019-09-21 ENCOUNTER — APPOINTMENT (INPATIENT)
Dept: RADIOLOGY | Facility: HOSPITAL | Age: 84
DRG: 023 | End: 2019-09-21
Payer: MEDICARE

## 2019-09-21 PROBLEM — I48.91 ATRIAL FIBRILLATION (HCC): Chronic | Status: ACTIVE | Noted: 2019-09-21

## 2019-09-21 PROBLEM — J18.9 CAP (COMMUNITY ACQUIRED PNEUMONIA): Status: ACTIVE | Noted: 2019-09-21

## 2019-09-21 PROBLEM — J69.0 ASPIRATION PNEUMONIA (HCC): Status: ACTIVE | Noted: 2019-09-21

## 2019-09-21 LAB
ANION GAP SERPL CALCULATED.3IONS-SCNC: 6 MMOL/L (ref 4–13)
ANION GAP SERPL CALCULATED.3IONS-SCNC: 7 MMOL/L (ref 4–13)
APTT PPP: 62 SECONDS (ref 23–37)
BASOPHILS # BLD AUTO: 0.12 THOUSANDS/ΜL (ref 0–0.1)
BASOPHILS NFR BLD AUTO: 1 % (ref 0–1)
BUN SERPL-MCNC: 14 MG/DL (ref 5–25)
BUN SERPL-MCNC: 16 MG/DL (ref 5–25)
CA-I BLD-SCNC: 1.08 MMOL/L (ref 1.12–1.32)
CALCIUM SERPL-MCNC: 8.1 MG/DL (ref 8.3–10.1)
CALCIUM SERPL-MCNC: 8.4 MG/DL (ref 8.3–10.1)
CHLORIDE SERPL-SCNC: 110 MMOL/L (ref 100–108)
CHLORIDE SERPL-SCNC: 111 MMOL/L (ref 100–108)
CO2 SERPL-SCNC: 22 MMOL/L (ref 21–32)
CO2 SERPL-SCNC: 23 MMOL/L (ref 21–32)
CREAT SERPL-MCNC: 0.68 MG/DL (ref 0.6–1.3)
CREAT SERPL-MCNC: 0.76 MG/DL (ref 0.6–1.3)
EOSINOPHIL # BLD AUTO: 0.29 THOUSAND/ΜL (ref 0–0.61)
EOSINOPHIL NFR BLD AUTO: 2 % (ref 0–6)
ERYTHROCYTE [DISTWIDTH] IN BLOOD BY AUTOMATED COUNT: 14.5 % (ref 11.6–15.1)
GFR SERPL CREATININE-BSD FRML MDRD: 72 ML/MIN/1.73SQ M
GFR SERPL CREATININE-BSD FRML MDRD: 80 ML/MIN/1.73SQ M
GLUCOSE SERPL-MCNC: 112 MG/DL (ref 65–140)
GLUCOSE SERPL-MCNC: 115 MG/DL (ref 65–140)
HCT VFR BLD AUTO: 36.2 % (ref 34.8–46.1)
HGB BLD-MCNC: 11.6 G/DL (ref 11.5–15.4)
IMM GRANULOCYTES # BLD AUTO: 0.06 THOUSAND/UL (ref 0–0.2)
IMM GRANULOCYTES NFR BLD AUTO: 0 % (ref 0–2)
LYMPHOCYTES # BLD AUTO: 3.42 THOUSANDS/ΜL (ref 0.6–4.47)
LYMPHOCYTES NFR BLD AUTO: 25 % (ref 14–44)
MAGNESIUM SERPL-MCNC: 2 MG/DL (ref 1.6–2.6)
MCH RBC QN AUTO: 27.3 PG (ref 26.8–34.3)
MCHC RBC AUTO-ENTMCNC: 32 G/DL (ref 31.4–37.4)
MCV RBC AUTO: 85 FL (ref 82–98)
MONOCYTES # BLD AUTO: 1.19 THOUSAND/ΜL (ref 0.17–1.22)
MONOCYTES NFR BLD AUTO: 9 % (ref 4–12)
NEUTROPHILS # BLD AUTO: 8.51 THOUSANDS/ΜL (ref 1.85–7.62)
NEUTS SEG NFR BLD AUTO: 63 % (ref 43–75)
NRBC BLD AUTO-RTO: 0 /100 WBCS
PLATELET # BLD AUTO: 395 THOUSANDS/UL (ref 149–390)
PMV BLD AUTO: 10.3 FL (ref 8.9–12.7)
POTASSIUM SERPL-SCNC: 3.4 MMOL/L (ref 3.5–5.3)
POTASSIUM SERPL-SCNC: 3.7 MMOL/L (ref 3.5–5.3)
RBC # BLD AUTO: 4.25 MILLION/UL (ref 3.81–5.12)
SODIUM SERPL-SCNC: 139 MMOL/L (ref 136–145)
SODIUM SERPL-SCNC: 140 MMOL/L (ref 136–145)
WBC # BLD AUTO: 13.59 THOUSAND/UL (ref 4.31–10.16)

## 2019-09-21 PROCEDURE — 80048 BASIC METABOLIC PNL TOTAL CA: CPT | Performed by: NURSE PRACTITIONER

## 2019-09-21 PROCEDURE — 82330 ASSAY OF CALCIUM: CPT | Performed by: PHYSICIAN ASSISTANT

## 2019-09-21 PROCEDURE — 70551 MRI BRAIN STEM W/O DYE: CPT

## 2019-09-21 PROCEDURE — 85025 COMPLETE CBC W/AUTO DIFF WBC: CPT | Performed by: PHYSICIAN ASSISTANT

## 2019-09-21 PROCEDURE — 99232 SBSQ HOSP IP/OBS MODERATE 35: CPT | Performed by: INTERNAL MEDICINE

## 2019-09-21 PROCEDURE — 99222 1ST HOSP IP/OBS MODERATE 55: CPT | Performed by: INTERNAL MEDICINE

## 2019-09-21 PROCEDURE — 93005 ELECTROCARDIOGRAM TRACING: CPT

## 2019-09-21 PROCEDURE — 97116 GAIT TRAINING THERAPY: CPT

## 2019-09-21 PROCEDURE — 80048 BASIC METABOLIC PNL TOTAL CA: CPT | Performed by: PHYSICIAN ASSISTANT

## 2019-09-21 PROCEDURE — 99232 SBSQ HOSP IP/OBS MODERATE 35: CPT | Performed by: PSYCHIATRY & NEUROLOGY

## 2019-09-21 PROCEDURE — 83735 ASSAY OF MAGNESIUM: CPT | Performed by: PHYSICIAN ASSISTANT

## 2019-09-21 PROCEDURE — 85730 THROMBOPLASTIN TIME PARTIAL: CPT | Performed by: INTERNAL MEDICINE

## 2019-09-21 RX ORDER — AMIODARONE HYDROCHLORIDE 200 MG/1
200 TABLET ORAL
Status: DISCONTINUED | OUTPATIENT
Start: 2019-09-21 | End: 2019-09-22

## 2019-09-21 RX ORDER — POLYETHYLENE GLYCOL 3350 17 G/17G
17 POWDER, FOR SOLUTION ORAL DAILY PRN
Status: DISCONTINUED | OUTPATIENT
Start: 2019-09-21 | End: 2019-09-21

## 2019-09-21 RX ORDER — METOPROLOL TARTRATE 5 MG/5ML
5 INJECTION INTRAVENOUS ONCE
Status: COMPLETED | OUTPATIENT
Start: 2019-09-21 | End: 2019-09-21

## 2019-09-21 RX ORDER — BISACODYL 10 MG
10 SUPPOSITORY, RECTAL RECTAL DAILY PRN
Status: DISCONTINUED | OUTPATIENT
Start: 2019-09-21 | End: 2019-09-21

## 2019-09-21 RX ORDER — POLYETHYLENE GLYCOL 3350 17 G/17G
17 POWDER, FOR SOLUTION ORAL DAILY PRN
Status: DISCONTINUED | OUTPATIENT
Start: 2019-09-21 | End: 2019-09-25 | Stop reason: HOSPADM

## 2019-09-21 RX ORDER — BISACODYL 10 MG
10 SUPPOSITORY, RECTAL RECTAL DAILY PRN
Status: DISCONTINUED | OUTPATIENT
Start: 2019-09-21 | End: 2019-09-25 | Stop reason: HOSPADM

## 2019-09-21 RX ORDER — METOPROLOL TARTRATE 5 MG/5ML
5 INJECTION INTRAVENOUS ONCE
Status: DISCONTINUED | OUTPATIENT
Start: 2019-09-21 | End: 2019-09-25 | Stop reason: HOSPADM

## 2019-09-21 RX ORDER — AMIODARONE HYDROCHLORIDE 200 MG/1
200 TABLET ORAL
Status: DISCONTINUED | OUTPATIENT
Start: 2019-09-29 | End: 2019-09-22

## 2019-09-21 RX ORDER — POTASSIUM CHLORIDE 20 MEQ/1
40 TABLET, EXTENDED RELEASE ORAL ONCE
Status: COMPLETED | OUTPATIENT
Start: 2019-09-21 | End: 2019-09-21

## 2019-09-21 RX ORDER — METOPROLOL TARTRATE 5 MG/5ML
5 INJECTION INTRAVENOUS EVERY 6 HOURS PRN
Status: DISCONTINUED | OUTPATIENT
Start: 2019-09-21 | End: 2019-09-25 | Stop reason: HOSPADM

## 2019-09-21 RX ADMIN — PAROXETINE 10 MG: 20 TABLET, FILM COATED ORAL at 08:02

## 2019-09-21 RX ADMIN — HEPARIN SODIUM AND DEXTROSE 12 UNITS/KG/HR: 10000; 5 INJECTION INTRAVENOUS at 14:15

## 2019-09-21 RX ADMIN — METOPROLOL TARTRATE 25 MG: 25 TABLET, FILM COATED ORAL at 20:40

## 2019-09-21 RX ADMIN — AMLODIPINE BESYLATE 2.5 MG: 2.5 TABLET ORAL at 08:02

## 2019-09-21 RX ADMIN — ATORVASTATIN CALCIUM 40 MG: 40 TABLET, FILM COATED ORAL at 17:16

## 2019-09-21 RX ADMIN — ASPIRIN 81 MG 81 MG: 81 TABLET ORAL at 08:02

## 2019-09-21 RX ADMIN — POLYETHYLENE GLYCOL 3350 17 G: 17 POWDER, FOR SOLUTION ORAL at 11:49

## 2019-09-21 RX ADMIN — METOPROLOL TARTRATE 5 MG: 5 INJECTION, SOLUTION INTRAVENOUS at 05:14

## 2019-09-21 RX ADMIN — BISACODYL 10 MG: 10 SUPPOSITORY RECTAL at 12:12

## 2019-09-21 RX ADMIN — METRONIDAZOLE 500 MG: 500 INJECTION, SOLUTION INTRAVENOUS at 19:24

## 2019-09-21 RX ADMIN — METRONIDAZOLE 500 MG: 500 INJECTION, SOLUTION INTRAVENOUS at 04:19

## 2019-09-21 RX ADMIN — SODIUM CHLORIDE 250 ML: 0.9 INJECTION, SOLUTION INTRAVENOUS at 22:36

## 2019-09-21 RX ADMIN — CEFTRIAXONE 1000 MG: 1 INJECTION, POWDER, FOR SOLUTION INTRAMUSCULAR; INTRAVENOUS at 12:50

## 2019-09-21 RX ADMIN — AMIODARONE HYDROCHLORIDE 200 MG: 200 TABLET ORAL at 17:16

## 2019-09-21 RX ADMIN — AMITRIPTYLINE HYDROCHLORIDE 25 MG: 25 TABLET, FILM COATED ORAL at 20:40

## 2019-09-21 RX ADMIN — METRONIDAZOLE 500 MG: 500 INJECTION, SOLUTION INTRAVENOUS at 11:46

## 2019-09-21 RX ADMIN — METOPROLOL TARTRATE 5 MG: 5 INJECTION, SOLUTION INTRAVENOUS at 20:25

## 2019-09-21 RX ADMIN — METOPROLOL TARTRATE 5 MG: 5 INJECTION, SOLUTION INTRAVENOUS at 06:54

## 2019-09-21 RX ADMIN — POTASSIUM CHLORIDE 40 MEQ: 1500 TABLET, EXTENDED RELEASE ORAL at 11:52

## 2019-09-21 RX ADMIN — METOPROLOL TARTRATE 25 MG: 25 TABLET, FILM COATED ORAL at 08:02

## 2019-09-21 NOTE — ASSESSMENT & PLAN NOTE
· POD #2 s/p R P1 thrombectomy with Dr Amber Hayden  · Neurosurgery note from 9/20 reviewed  They have cleared for dvt ppx, anti-platelet and anticoagulation--they did not agree with radiology reading of petechial hemorrhage  They have signed off, f/u PRN as outpatient   · MRI Brain pending per their recommendations   · Appreciate neurology input: recommend DOAC when safe  On IV heparin infusion in interim   · PT/OT input appreciated  Will require STR at discharge   CM to obtain referrals  · Avoid restraints as able, patient with intermittent periods of agitation

## 2019-09-21 NOTE — PLAN OF CARE
Problem: Prexisting or High Potential for Compromised Skin Integrity  Goal: Skin integrity is maintained or improved  Description  INTERVENTIONS:  - Identify patients at risk for skin breakdown  - Assess and monitor skin integrity  - Assess and monitor nutrition and hydration status  - Monitor labs   - Assess for incontinence   - Turn and reposition patient  - Assist with mobility/ambulation  - Relieve pressure over bony prominences  - Avoid friction and shearing  - Provide appropriate hygiene as needed including keeping skin clean and dry  - Evaluate need for skin moisturizer/barrier cream  - Collaborate with interdisciplinary team   - Patient/family teaching  - Consider wound care consult   Outcome: Progressing     Problem: Neurological Deficit  Goal: Neurological status is stable or improving  Description  Interventions:  - Monitor and assess patient's level of consciousness, motor function, sensory function, and level of assistance needed for ADLs  - Monitor and report changes from baseline  Collaborate with interdisciplinary team to initiate plan and implement interventions as ordered  - Provide and maintain a safe environment  - Consider seizure precautions  - Consider fall precautions  - Consider aspiration precautions  - Consider bleeding precautions  Outcome: Progressing     Problem: Activity Intolerance/Impaired Mobility  Goal: Mobility/activity is maintained at optimum level for patient  Description  Interventions:  - Assess and monitor patient  barriers to mobility and need for assistive/adaptive devices  - Assess patient's emotional response to limitations  - Collaborate with interdisciplinary team and initiate plans and interventions as ordered  - Encourage independent activity per ability   - Maintain proper body alignment  - Perform active/passive rom as tolerated/ordered    - Plan activities to conserve energy   - Turn patient as appropriate  Outcome: Progressing     Problem: Nutrition  Goal: Nutrition/Hydration status is improving  Description  Monitor and assess patient's nutrition/hydration status for malnutrition (ex- brittle hair, bruises, dry skin, pale skin and conjunctiva, muscle wasting, smooth red tongue, and disorientation)  Collaborate with interdisciplinary team and initiate plan and interventions as ordered  Monitor patient's weight and dietary intake as ordered or per policy  Utilize nutrition screening tool and intervene per policy  Determine patient's food preferences and provide high-protein, high-caloric foods as appropriate  - Assist patient with eating   - Allow adequate time for meals   - Encourage patient to take dietary supplement as ordered  - Collaborate with clinical nutritionist   - Include patient/family/caregiver in decisions related to nutrition  Outcome: Progressing     Problem: CONFUSION/THOUGHT DISTURBANCE  Goal: Thought disturbances (confusion, delirium, depression, dementia or psychosis) are managed to maintain or return to baseline mental status and functional level  Description  INTERVENTIONS:  - Assess for possible contributors to  thought disturbance, including but not limited to medications, infection, impaired vision or hearing, underlying metabolic abnormalities, dehydration, respiratory compromise,  psychiatric diagnoses and notify attending PHYSICAN/AP  - Monitor and intervene to maintain adequate nutrition, hydration, elimination, sleep and activity  - Decrease environmental stimuli, including noise as appropriate  - Provide frequent contacts to provide refocusing, direction and reassurance as needed  Approach patient calmly with eye contact and at their level    - [place] high risk fall precautions, aspiration precautions and other safety measures, as indicated  - If delirium suspected, notify physician/AP of change in condition and request immediate in-person evaluation  - Pursue consults as appropriate including Geriatric (campus dependent), OT for cognitive evaluation/activity planning, psychiatric, pastoral care, etc   Outcome: Progressing     Problem: Potential for Falls  Goal: Patient will remain free of falls  Description  INTERVENTIONS:  - Assess patient frequently for physical needs  -  Identify cognitive and physical deficits and behaviors that affect risk of falls    -  [place] fall precautions as indicated by assessment   - Educate patient/family on patient safety including physical limitations  - Instruct patient to call for assistance with activity based on assessment  - Modify environment to reduce risk of injury  - Consider OT/PT consult to assist with strengthening/mobility  Outcome: Progressing     Problem: PAIN - ADULT  Goal: Verbalizes/displays adequate comfort level or baseline comfort level  Description  Interventions:  - Encourage patient to monitor pain and request assistance  - Assess pain using appropriate pain scale  - Administer analgesics based on type and severity of pain and evaluate response  - Implement non-pharmacological measures as appropriate and evaluate response  - Notify physician/advanced practitioner if interventions unsuccessful or patient reports new pain   Outcome: Progressing     Problem: INFECTION - ADULT  Goal: Absence or prevention of progression during hospitalization  Description  INTERVENTIONS:  - Assess and monitor for signs and symptoms of infection  - Monitor lab/diagnostic results  - Monitor all insertion sites, i e  indwelling lines, tubes, and drains  - [place] appropriate cooling/warming therapies per order  - Administer medications as ordered  - Instruct and encourage patient and family to use good hand hygiene technique  - Identify and instruct in appropriate isolation precautions for identified infection/condition   Outcome: Progressing     Problem: SAFETY ADULT  Goal: Patient will remain free of falls  Description  INTERVENTIONS:  - Assess patient frequently for physical needs  -  Identify cognitive and physical deficits and behaviors that affect risk of falls  -  [place] fall precautions as indicated by assessment   - Educate patient/family on patient safety including physical limitations  - Instruct patient to call for assistance with activity based on assessment  - Modify environment to reduce risk of injury  - Consider OT/PT consult to assist with strengthening/mobility  Outcome: Progressing     Problem: DISCHARGE PLANNING  Goal: Discharge to home or other facility with appropriate resources  Description  INTERVENTIONS:  - Identify barriers to discharge w/patient and caregiver  - Arrange for needed discharge resources and transportation as appropriate  - Identify discharge learning needs (meds, wound care, etc )  - Refer to Case Management Department for coordinating discharge planning if the patient needs post-hospital services based on physician/advanced practitioner order or complex needs related to functional status, cognitive ability, or social support system   Outcome: Progressing     Problem: Knowledge Deficit  Goal: Patient/family/caregiver demonstrates understanding of disease process, treatment plan, medications, and discharge instructions  Description  Complete learning assessment and assess knowledge base    Interventions:  - Provide teaching at level of understanding  - Provide teaching via preferred learning methods  Outcome: Progressing

## 2019-09-21 NOTE — SOCIAL WORK
Physician asked CM to cost check Pradaxa versus Coumadin  CM entered pt's room and spouse present  Spouse stated that he doesn't want pt on Coumadin due to the frequent lab work  Spouse stated they will use [address] in [facility] ([phone])  Cm called the pharmacy and spoke with Jean who reported that without the scripts, they can do a cash cost comparison  Jean stated that the pharmacist will return call to this CM  Await return call  Physician stated that pt is not ready for discharge  4:30 CM visited pt and and  to check on SNF choices   stated that he hasn't had a chance to look at the list yet and left the list at home   is traveling home tonight and back to hospital tomorrow  He agreed to review the list and give more choices tomorrow   would like CM to send referral to [facility] because it it close to him   CM sent referral

## 2019-09-21 NOTE — CONSULTS
Consultation - Cardiology Team One  Handy Ontiveros 80 y o  female MRN: [id number]  Unit/Bed#: [facility] 727-01 Encounter: [id number]    Inpatient consult to Cardiology  Consult performed by: JANAK Foote  Consult ordered by: Fleet Hodgkin, PA-C          Physician Requesting Consult: Thao Silva MD  Reason for Consult / Principal Problem: Acute CVA, AF with RVR      Assessment/ Plan    1  Acute right PCA CVA, S/P right P1 thrombectomy on 9/19/19  Etiology : likely cardioembolic given history of Atrial fib without oral AC x 2 weeks  No further neurosurgical interventions recommended at this time   Cleared for initiation of anticoagulation medication  Previously on Xarelto according to neuro attending however med student note states Eliquis  Currently maintained on Heparin gtt  ? CT head 09/19/2019:  New small regions of Pap she hyperdensity in the right occipital lobe and posterior medial right temporal lobe related to contrast   Moderate chronic microangiopathy  ? CT head 09/20/2019:  Evolving right PCA territory infarct, status post embolectomy  No evidence of acute hemorrhage at this time  Cerebral atrophy  Age indeterminate hypodensity anterior aspect right hemipelvis  ? MRI pending  2 Chronic atrial fibrillation  Likely cause of #1  Remains hemodynamically stable  On Metoprolol 25 mg q12 hours  Still having intermittent, sustained bouts of RVR in the 150-160 range  Currently rate controlled in SR  Will start on oral Amio load with 200 mg TID x 1 week followed by 200 mg once daily thereafter  On Heparin gtt  Can likely transition to oral NOAC, preferably with reversibility given her recent hemoptysis or Coumadin  Will discuss options with case management    3  HTN  Reasonably controlled on Amlodipine, may need uptitration  4 Aspiration pneumonia   Management per primary team          HPI:  80year old female with PMH of atrial fibrillation, off oral AC since early September, recent CVA in 3/2019, HTN presented to the ED at [facility] ? on 9/19/19 with left sided hemiparesis with right gaze  Had last been seen normal at 9:30 PM that same evening so was unfortunately out of the tPA window  BP was controlled at the time  CT revealed new PCA occlusion with left VF cut  Patient was urgently transferred to the [facility] for mechanical thrombectomy  Patient underwent the  procedure without incident and was admitted to the ICU post recovery  Source thought to be cardioembolic given she was off anticoagulation for two weeks prior to event  Post operative CT showed some evolution of stroke  No petechial hemorrhage identified by neurosurgery or ICU team  She did experiences episodes of atrial fib with RVR the night of 9/19 through 9/20 and her home dose of Metoprolol was restarted  Appears today she had multiple episodes of sustained SVT with rat es in the 150s but appears to have broken after IV Metoprolol  At present patient is in sinus rhythm in the 80s  Appears warm, perfused and hemodynamically stable  Tells me she believes she was initially started on Eliquis with her first CVA in March but was taken off for unknown reasons and started on Xarelto  Did present to the ED two weeks ago at [facility] after starting with hemoptysis but was told to continue to hold her oral AC  EKG reviewed personally:         Telemetry reviewed personally:         Review of Systems   All other systems reviewed and are negative      Historical Information   Past Medical History:   Diagnosis Date    Asthma     Atrial fibrillation (HCC)     Fibromyalgia     GERD (gastroesophageal reflux disease)     H/O emphysema     Hypercholesterolemia      Past Surgical History:   Procedure Laterality Date    CHOLECYSTECTOMY      HYSTERECTOMY       Social History     Substance and Sexual Activity   Alcohol Use Not Currently     Social History     Substance and Sexual Activity   Drug Use Never     Social History     Tobacco Use Smoking Status Never Smoker   Smokeless Tobacco Never Used     Family History: non-contributory    Meds/Allergies   all current active meds have been reviewed    heparin (porcine) 3-20 Units/kg/hr (Order-Specific) Last Rate: 12 Units/kg/hr (09/20/19 1349)       Allergies   Allergen Reactions    Ciprofloxacin     Penicillins      Other reaction(s): Itching       Objective   Vitals: Blood pressure 138/80, pulse (!) 109, temperature 98 6 °F (37 °C), resp  rate 20, weight 79 kg (174 lb 2 6 oz), SpO2 95 %  ,     Body mass index is 28 98 kg/m²  ,     Systolic (30OYI), PRF:443 , Min:115 , WIB:626     Diastolic (77HRU), PSR:44, Min:49, Max:99            Intake/Output Summary (Last 24 hours) at 9/21/2019 1138  Last data filed at 9/21/2019 0800  Gross per 24 hour   Intake 364 17 ml   Output 774 ml   Net -409 83 ml     Weight (last 2 days)     Date/Time   Weight    09/21/19 0536   79 (174 16)    09/20/19 0600   76 7 (169 09)            Invasive Devices     Peripheral Intravenous Line            Peripheral IV 09/19/19 Left Hand 2 days    Peripheral IV 09/20/19 Right Antecubital less than 1 day          Drain            NG/OG Tube Orogastric Center mouth 2 days                  Physical Exam   Constitutional: She is oriented to person, place, and time  She appears well-developed and well-nourished  HENT:   Head: Normocephalic  Eyes: Pupils are equal, round, and reactive to light  Conjunctivae and EOM are normal    Neck: Neck supple  No JVD present  No tracheal deviation present  Cardiovascular: Regular rhythm, normal heart sounds and intact distal pulses  Exam reveals no gallop and no friction rub  No murmur heard  Pulmonary/Chest: Effort normal and breath sounds normal  No stridor  No respiratory distress  She has no wheezes  Abdominal: Soft  Bowel sounds are normal  She exhibits no distension  Musculoskeletal: She exhibits no edema  Left  sided weakness      Neurological: She is oriented to person, place, and time    somnolent   Skin: Skin is warm and dry  Capillary refill takes less than 2 seconds  Psychiatric: She has a normal mood and affect   Her behavior is normal  Judgment and thought content normal          LABORATORY RESULTS:      CBC with diff: Results from last 7 days   Lab Units 09/21/19 0535 09/20/19 0610 09/19/19 1137 09/19/19  0812   WBC Thousand/uL 13 59* 14 19* 13 42*  --    HEMOGLOBIN g/dL 11 6 11 0* 11 4*  --    I STAT HEMOGLOBIN g/dl  --   --   --  10 9*   HEMATOCRIT % 36 2 34 9 36 0  --    HEMATOCRIT, ISTAT %  --   --   --  32*   MCV fL 85 86 87  --    PLATELETS Thousands/uL 395* 372 340  --    MCH pg 27 3 27 2 27 4  --    MCHC g/dL 32 0 31 5 31 7  --    RDW % 14 5 14 5 14 5  --    MPV fL 10 3 10 4 10 6  --    NRBC AUTO /100 WBCs 0 0 0  --        CMP:  Results from last 7 days   Lab Units 09/21/19 0535 09/20/19 0610 09/20/19 0022 09/19/19 1137 09/19/19  0812   POTASSIUM mmol/L 3 4* 4 7 3 2* 3 4*  --    CHLORIDE mmol/L 111* 116*  --  114*  --    CO2 mmol/L 22 21  --  19*  --    CO2, I-STAT mmol/L  --   --   --   --  19*   BUN mg/dL 14 12  --  17  --    CREATININE mg/dL 0 68 0 60  --  0 61  --    GLUCOSE, ISTAT mg/dl  --   --   --   --  136   CALCIUM mg/dL 8 1* 8 0*  --  7 1*  --    EGFR ml/min/1 73sq m 80 83  --  83  --        BMP:  Results from last 7 days   Lab Units 09/21/19 0535 09/20/19 0610 09/20/19 0022 09/19/19 1137 09/19/19  0812   POTASSIUM mmol/L 3 4* 4 7 3 2* 3 4*  --    CHLORIDE mmol/L 111* 116*  --  114*  --    CO2 mmol/L 22 21  --  19*  --    CO2, I-STAT mmol/L  --   --   --   --  19*   BUN mg/dL 14 12  --  17  --    CREATININE mg/dL 0 68 0 60  --  0 61  --    GLUCOSE, ISTAT mg/dl  --   --   --   --  136   CALCIUM mg/dL 8 1* 8 0*  --  7 1*  --           No results found for: NTBNP         Results from last 7 days   Lab Units 09/21/19  0535 09/20/19  0610 09/20/19  0022 09/19/19  1137   MAGNESIUM mg/dL 2 0 2 4 1 8 1 8          Results from last 7 days   Lab Units 19  0610   HEMOGLOBIN A1C % 5 5              Results from last 7 days   Lab Units 19  1350   INR  1 16     Lipid Profile:   No results found for: CHOL  Lab Results   Component Value Date    HDL 54 2019     Lab Results   Component Value Date    LDLCALC 47 2019     Lab Results   Component Value Date    TRIG 70 2019         Cardiac testing:   Results for orders placed during the hospital encounter of 19   Echo complete with contrast if indicated    Narrative [address]  [address] [place] [address]  [phone]    Transthoracic Echocardiogram  2D, M-mode, Doppler, and Color Doppler    Study date:  19-Sep-2019    Patient: Juliet Buitrago  MR number: [id number]  Account number: [de-identified]  : 1934  Age: 80 years  Gender: Female  Status: Inpatient  Location: Bedside  Height: 65 in  Weight: 169 lb  BP: 145/ 43 mmHg    Indications: CVA  Diagnoses: I67 9 - Cerebrovascular disease, unspecified    Sonographer:  Giovany Hernandez RDCS  Primary Physician:  Fany Bagley MD  Referring Physician:  Patricia Davies PA-C  Group:  [facility]  Cardiology Fellow:  Balbir Mancilla MD  Interpreting Physician:  Ramya Leone MD    SUMMARY    LEFT VENTRICLE:  Systolic function was normal  Ejection fraction was estimated to be 65 %  Doppler parameters were consistent with abnormal left ventricular relaxation (grade 1 diastolic dysfunction)  RIGHT VENTRICLE:  The size was normal   Systolic function was normal     LEFT ATRIUM:  The atrium was mildly dilated  AORTIC VALVE:  There was mild regurgitation  TRICUSPID VALVE:  There was trace regurgitation  Pulmonary artery systolic pressure was within the normal range  HISTORY: PRIOR HISTORY: Asthma  Afib  GERD  Hyperlipidemia  PROCEDURE: The procedure was performed at the bedside  This was a routine study  The transthoracic approach was used   The study included complete 2D imaging, M-mode, complete spectral Doppler, and color Doppler  The heart rate was 75 bpm,  at the start of the study  Images were obtained from the parasternal, apical, subcostal, and suprasternal notch acoustic windows  Image quality was adequate  LEFT VENTRICLE: Size was normal  Systolic function was normal  Ejection fraction was estimated to be 65 %  Although no diagnostic regional wall motion abnormality was identified, this possibility cannot be completely excluded on the basis  of this study  Wall thickness was normal  There was no evidence of concentric hypertrophy  DOPPLER: Doppler parameters were consistent with abnormal left ventricular relaxation (grade 1 diastolic dysfunction)  RIGHT VENTRICLE: The size was normal  Systolic function was normal     LEFT ATRIUM: The atrium was mildly dilated  RIGHT ATRIUM: Size was normal     MITRAL VALVE: There was mild annular calcification  There was normal leaflet separation  DOPPLER: The transmitral velocity was within the normal range  There was no evidence for stenosis  There was trace regurgitation  AORTIC VALVE: The valve was trileaflet  Leaflets exhibited mild calcification, normal cuspal separation, and sclerosis  DOPPLER: Transaortic velocity was within the normal range  There was no evidence for stenosis  There was mild  regurgitation  TRICUSPID VALVE: The valve structure was normal  There was normal leaflet separation  DOPPLER: The transtricuspid velocity was within the normal range  There was no evidence for stenosis  There was trace regurgitation  Pulmonary artery  systolic pressure was within the normal range  Estimated peak PA pressure was 33 mmHg  PULMONIC VALVE: Leaflets exhibited normal thickness, no calcification, and normal cuspal separation  DOPPLER: The transpulmonic velocity was within the normal range  There was no evidence for stenosis  There was trace regurgitation      PERICARDIUM: There was no pericardial effusion  The pericardium was normal in appearance  AORTA: The root exhibited normal size  SYSTEMIC VEINS: IVC: The inferior vena cava was normal in size and course  Respirophasic changes were normal     PULMONARY VEINS: DOPPLER: Doppler flow pattern was normal in the pulmonary vein(s)  SYSTEM MEASUREMENT TABLES    2D mode  AV Diam: 2 9 cm  AoR Diam; Mean (2D): 2 9 cm  LA Diam (2D): 4 cm  LA Dimension; Mean (2D): 4 cm  LA/Ao (2D): 1 38  EDV (2D-Cubed): 64 cm3  EF (2D-Cubed): 69 2 %  ESV (2D-Cubed): 19 7 cm3  FS (2D-Cubed): 32 5 %  FS (2D-Teich): 32 5 %  IVS/LVPW (2D): 1 1  IVSd (2D): 1 1 cm  IVSd; Mean chosen (2D): 1 1 cm  LVIDd (2D): 4 cm  LVIDd; Mean (2D): 4 cm  LVIDs (2D): 2 7 cm  LVIDs; Mean (2D): 2 7 cm  LVPWd (2D): 1 cm  LVPWd; Mean (2D): 1 cm  Left Ventricular Ejection Fraction; Teichholz; 2D mode;: 61 4 %  Left Ventricular End Diastolic Volume; Teichholz; 2D mode;: 70 cm3  Left Ventricular End Systolic Volume; Teichholz; 2D mode;: 27 cm3  SV (2D-Cubed): 44 3 cm3  Stroke Volume; Teichholz; 2D mode;: 43 cm3  RVIDd (2D): 3 1 cm  RVIDd; Mean (2D): 3 1 cm  Right and Left Ventricular End Diastolic Diameter Ratio; 2D mode;: 0 78    Apical four chamber  LV MOD Diam; Recent value; End Diastole (A4C): 4 96 cm  LV MOD Diam; Recent value; End Diastole (A4C): 4 57 cm  LV MOD Diam; Recent value; End Diastole (A4C): 4 57 cm  LV MOD Diam; Recent value; End Diastole (A4C): 4 59 cm  LV MOD Diam; Recent value; End Diastole (A4C): 4 67 cm  LV MOD Diam; Recent value; End Diastole (A4C): 4 65 cm  LV MOD Diam; Recent value; End Diastole (A4C): 4 49 cm  LV MOD Diam; Recent value; End Diastole (A4C): 4 23 cm  LV MOD Diam; Recent value; End Diastole (A4C): 3 94 cm  LV MOD Diam; Recent value; End Diastole (A4C): 3 58 cm  LV MOD Diam; Recent value; End Diastole (A4C): 3 26 cm  LV MOD Diam; Recent value; End Diastole (A4C): 2 82 cm  LV MOD Diam; Recent value; End Diastole (A4C): 2 33 cm  LV MOD Diam; Recent value;  End Diastole (A4C): 1 67 cm  LV MOD Diam; Recent value; End Diastole (A4C): 4 88 cm  LV MOD Diam; Recent value; End Diastole (A4C): 4 7 cm  LV MOD Diam; Recent value; End Diastole (A4C): 4 25 cm  LV MOD Diam; Recent value; End Diastole (A4C): 2 19 cm  LV MOD Diam; Recent value; End Diastole (A4C): 4 96 cm  LV MOD Diam; Recent value; End Diastole (A4C): 4 73 cm  LV MOD Diam; Recent value; End Systole (A4C): 2 76 cm  LV MOD Diam; Recent value; End Systole (A4C): 3 48 cm  LV MOD Diam; Recent value; End Systole (A4C): 3 63 cm  LV MOD Diam; Recent value; End Systole (A4C): 3 61 cm  LV MOD Diam; Recent value; End Systole (A4C): 3 43 cm  LV MOD Diam; Recent value; End Systole (A4C): 3 17 cm  LV MOD Diam; Recent value; End Systole (A4C): 2 84 cm  LV MOD Diam; Recent value; End Systole (A4C): 2 66 cm  LV MOD Diam; Recent value; End Systole (A4C): 2 56 cm  LV MOD Diam; Recent value; End Systole (A4C): 2 51 cm  LV MOD Diam; Recent value; End Systole (A4C): 2 48 cm  LV MOD Diam; Recent value; End Systole (A4C): 2 46 cm  LV MOD Diam; Recent value; End Systole (A4C): 2 41 cm  LV MOD Diam; Recent value; End Systole (A4C): 2 28 cm  LV MOD Diam; Recent value; End Systole (A4C): 2 12 cm  LV MOD Diam; Recent value; End Systole (A4C): 1 94 cm  LV MOD Diam; Recent value; End Systole (A4C): 1 74 cm  LV MOD Diam; Recent value; End Systole (A4C): 1 48 cm  LV MOD Diam; Recent value; End Systole (A4C): 1 15 cm  LV MOD Diam; Recent value; End Systole (A4C): 0 82 cm  LVEF MOD A4C: 65 4 %  Left Ventricle diastolic major axis; Most recent value chosen; Method of Disks, Single Plane; 2D mode; Apical four chamber;: 7 44 cm  Left Ventricle systolic major axis; Most recent value chosen; Method of Disks, Single Plane; 2D mode; Apical four chamber;: 6 52 cm  Left Ventricular Diastolic Area; Most recent value chosen; Method of Disks, Single Plane; 2D mode; Apical four chamber;: 2980 mm2  Left Ventricular End Diastolic Volume;  Most recent value chosen; Method of Disks, Single Plane; 2D mode; Apical four chamber;: 99 2 cm3  Left Ventricular End Systolic Volume; Most recent value chosen; Method of Disks, Single Plane; 2D mode; Apical four chamber;: 34 4 cm3  Left Ventricular Systolic Area; Most recent value chosen; Method of Disks, Single Plane; 2D mode; Apical four chamber;: 1580 mm2  SV MOD A4C: 64 9 cm3    M mode  Inferior Vena Cava Diameter; During Expiration; M mode;: 2 93 cm  Inferior Vena Cava Diameter; Mean; Mean value chosen; During Expiration; M mode;: 2 93 cm  Tricuspid Annular Plane Systolic Excursion; Mean; Mean value chosen; Tricuspid Annulus; M mode;: 1 87 cm  Tricuspid Annular Plane Systolic Excursion; Tricuspid Annulus; M mode;: 1 87 cm    Tissue Doppler Imaging  LV Peak Early Luciano Tissue Irvin; Medial MA (TDI): 49 mm/s  Left Ventricular Peak Early Diastolic Tissue Velocity; Mean; Mean value chosen; Medial Mitral Annulus; Tissue Doppler Imaging;: 49 mm/s    Unspecified Scan Mode  Dec [place]; Mean; Regurgitant Flow: 3680 mm/s2  Dec [place]; Regurgitant Flow: 3680 mm/s2  PHT; Mean; Regurgitant Flow: 334 ms  PHT; Regurgitant Flow: 334 ms  Peak Grad; Mean; Regurgitant Flow: 71 mm[Hg]  Vmax; Mean; Regurgitant Flow: 4200 mm/s  Vmax; Regurgitant Flow: 4200 mm/s  MV Peak Irvin/LV Peak Tissue Irvin E-Wave; Medial MA: 22 7  DT; Antegrade Flow: 197 ms  DT; Mean; Antegrade Flow: 197 ms  Dec [place]; Antegrade Flow: 5610 mm/s2  Dec [place]; Mean; Antegrade Flow: 5610 mm/s2  MV A Irvin: 1140 mm/s  MV E Irvin: 1110 mm/s  MV E/A Ratio: 1  MV Peak A Irvin: 1140 mm/s  MV Peak E Irvin; Mean; Antegrade Flow: 1110 mm/s  MVA (PHT): 379 mm2  PHT: 58 ms  PHT;  Mean: 58 ms  Peak Grad; Mean; Regurgitant Flow: 27 mm[Hg]  Vmax; Mean; Regurgitant Flow: 2580 mm/s  Vmax; Regurgitant Flow: 2580 mm/s    [facility]    Prepared and electronically signed by    Philip Fernandez MD  Signed 19-Sep-2019 16:59:12       No results found for this or any previous visit  No procedure found  No results found for this or any previous visit  Imaging: I have personally reviewed pertinent reports  Xr Chest Portable    Result Date: 9/19/2019  Narrative: CHEST INDICATION:   Line placement  COMPARISON:  None available EXAM PERFORMED/VIEWS:  XR CHEST PORTABLE Single image FINDINGS:  Lungs appear adequately aerated  Patchy opacities in left mid lung and left lower lung as well as right upper lobe and right lower lung  Cardiomegaly without vascular congestion  Mild peribronchial thickening  Endotracheal tube tip at thoracic inlet approximately 3 5 cm above celi  Right jugular central line tip at the cavoatrial junction  EKG leads in place  No pneumothorax  NG tube in the stomach, tip not seen  Electronic device projecting over the aortic arch of uncertain type, presumably external to the patient Bony structures grossly intact  Impression: Right jugular central line tip in satisfactory position  No pneumothorax  Cardiomegaly without vascular congestion  Scattered opacities in both lungs presumably atelectatic although infiltrates are not excluded by this x-ray  Follow-up as clinically appropriate  Workstation performed: [id number]     Ct Head Wo Contrast    Result Date: 9/20/2019  Narrative: CT BRAIN - WITHOUT CONTRAST INDICATION:   Stroke  History of left-sided weakness  Status post embolectomy of right PCA thrombus, post procedure day #1  Follow-up  COMPARISON:  CT brain September 19, 2019, CT perfusion study September 19, 2019 and cerebral angiogram and thrombectomy September 19, 2019  TECHNIQUE:  CT examination of the brain was performed  In addition to axial images, coronal 2D reformatted images were created and submitted for interpretation  Radiation dose length product (DLP) for this visit:  946 6 mGy-cm     This examination, like all CT scans performed in the [facility], was performed utilizing techniques to minimize radiation dose exposure, including the use of iterative reconstruction and automated exposure control  IMAGE QUALITY:  Diagnostic  FINDINGS: PARENCHYMA:  No acute intraparenchymal hemorrhage or extra-axial fluid collection  Ill-defined area of decreased attenuation in the posterior medial right temporal lobe, extending into the right occipital lobe  This appears similar to the prior study  There has been near complete resolution of the hyperdensity along the medial right occipital lobe, consistent with contrast staining  Vague hyperdensity along the cortical margins of the left occipital lobe, likely petechial hemorrhage  No intraparenchymal hemorrhage  Decreased attenuation in the periventricular regions and centrum semiovale bilaterally, consistent with chronic small vessel ischemic change  Ill-defined hypodensity in the anterior aspect of the right hemipelvis, that are seen on today's examination  Age-indeterminate infarction not excluded  VENTRICLES AND EXTRA-AXIAL SPACES:  Ventricles and cerebral sulci moderately dilated  VISUALIZED ORBITS AND PARANASAL SINUSES:  Globes are symmetric and intact  Bilateral lens surgery  Mild mucosal thickening in the maxillary sinuses bilaterally and ethmoid air cells bilaterally  Near complete occlusion of the left sphenoid sinus secondary to mucosal thickening and inspissated secretions  Mastoid air cells and middle ear cavities are clear  CALVARIUM AND EXTRACRANIAL SOFT TISSUES:  Normal      Impression: 1  Evolving right PCA territory infarction, status post embolectomy post procedure day #1  Vague foci of increased density along the cortical margin, likely representing petechial hemorrhage  2   Cerebral atrophy with chronic small vessel ischemic change  3   Age-indeterminate infarction anterior aspect right hemipelvis  Follow-up MRI of the brain is recommended for further evaluation    On the preprocedure chest radiograph, however there was an electronic device overlying the superior mediastinum  There are no leads which appear to be attached to the underlying heart to  suggest a pacing device  This does not have a typical appearance for a spinal stimulator orotracheal nerve stimulator  Recommend follow-up chest radiograph prior to MRI  The study was marked in [facility] for immediate notification  Workstation performed: [id number]     Ct Head Wo Contrast    Result Date: 9/19/2019  Narrative: CT BRAIN - WITHOUT CONTRAST INDICATION:   Stroke  Follow-up evaluation  Status post endovascular intervention for a stroke  Status post recanalization of the right posterior cerebral artery occlusion of the P1 segment  COMPARISON:  9/18/2019 TECHNIQUE:  CT examination of the brain was performed  In addition to axial images, coronal 2D reformatted images were created and submitted for interpretation  Radiation dose length product (DLP) for this visit:  926 28 mGy-cm   This examination, like all CT scans performed in the [facility], was performed utilizing techniques to minimize radiation dose exposure, including the use of iterative  reconstruction and automated exposure control  IMAGE QUALITY:  Diagnostic  FINDINGS: PARENCHYMA:  There is a small amount of amorphous patchy hyperdensity in the medial aspect of the right occipital lobe on image 20, series 2  Additionally in the medial aspect of the right temporal lobe, immediately posterior and inferior to the temporal horn of the right lateral ventricle there is a 2nd, similar small region of patchy parenchymal density image 19, series 2  These are new from the previous study  These areas of slightly increased patchy density correlate to the areas of elevated Tmax >6 seconds on the prior perfusion images, image 1 of series 601  There is apparent high density in the intradural vascular circulation and choroid plexus, possibly related to recent contrast administration   Patchy periventricular hypodensity noted elsewhere is similar to the previous study  Atherosclerotic calcifications noted  VENTRICLES AND EXTRA-AXIAL SPACES:  Ventricular prominence is unchanged, not dilated out of proportion to the degree of parenchymal volume loss  VISUALIZED ORBITS AND PARANASAL SINUSES:  Bilateral lens implants noted  Minimal bilateral maxillary mucosal thickening  CALVARIUM AND EXTRACRANIAL SOFT TISSUES:  Normal      Impression: 1  New small regions of patchy hyperdensity in the right occipital lobe and posterior medial right temporal lobe, possibly related to contrast stasis and staining associated with regions of ischemia/infarction  Alternatively, small petechial hemorrhages in this region are not excluded but felt to be less likely  Continued clinical and imaging surveillance recommended  3   Moderate, chronic microangiopathy  Workstation performed: [id number]     Ct Cerebral Perfusion    Result Date: 9/19/2019  Narrative: CT PERFUSION INDICATION:  I63 429: Cerebral infarction due to embolism of unspecified anterior cerebral artery  COMPARISON:  None TECHNIQUE: CT perfusion study was performed  A total of 8 cm of brain tissue was evaluated in two different volumetric acquisitions  iSchDonuts RAPID software was utilized to calculate cerebral blood flow, cerebral blood volume, mean transit time, and  Tmax  Radiation dose length product (DLP) for this visit:  2858  23 mGy-cm   This examination, likeInfinite all CT scans performed in the [facility], was performed utilizing techniques to minimize radiation dose exposure, including the use of iterative reconstruction and automated exposure control  IV Contrast:  80 mL of iohexol (OMNIPAQUE)  IMAGE QUALITY:  Diagnostic  FINDINGS: Hemisphere affected:  Right hemisphere  Total CBF<30% volume:  0 mL Total Tmax>6 0s volume:  26 mL Total Mismatch difference:  26 mL Total Mismatch ratio:  infinite Hypoperfusion Index (Tmax>10s/Tmax  6s): [HIR] Additional comments:  None Impression: CT perfusion performed  Data available on PACS  Workstation performed: [id number]     Xr Chest Portable Icu    Result Date: 9/20/2019  Narrative: CHEST INDICATION:   f/u pneumonia  COMPARISON:  Chest x-ray performed the previous day  EXAM PERFORMED/VIEWS:  XR CHEST PORTABLE ICU FINDINGS:  Interval extubation and removal of OG tube Right IJ catheter tip in right atrium  projects over the left chest wall Cardiomediastinal silhouette appears unremarkable  Diffuse bilateral interstitial opacities, increased  No pneumothorax  Blunting of the left costophrenic angle is new  Osseous structures appear within normal limits for patient age  Impression: 1  Increased diffuse interstitial opacities 2  Likely small left pleural effusion with adjacent basal opacity, atelectasis versus pneumonia Workstation performed: [id number]         Assessment  Principal Problem:    Acute right PCA stroke ([address] )                   Thank you for allowing us to participate in this patient's care  This pt will follow up with          once discharged  Counseling / Coordination of Care  Total floor / unit time spent today 45 minutes  Greater than 50% of total time was spent with the patient and / or family counseling and / or coordination of care  A description of the counseling / coordination of care: Review of history, current assessment, development of a plan  Code Status: Level 1 - Full Code    ** Please Note: Dragon 360 Dictation voice to text software may have been used in the creation of this document   **

## 2019-09-21 NOTE — QUICK NOTE
Spoke with neurology  Recommended waiting to start oral anticoagulation until day #5 post CVA  Case management pricing Coumadin versus Pradaxa

## 2019-09-21 NOTE — PROGRESS NOTES
Patient in SVT at 0054 with , lasting approximately 30 seconds then sustaining in the 80's  Asymptomatic at that time  Currently had another episode of SVT, , and is asymptomatic  SLIM notified via tiger text

## 2019-09-21 NOTE — PLAN OF CARE
Problem: PHYSICAL THERAPY ADULT  Goal: Performs mobility at highest level of function for planned discharge setting  See evaluation for individualized goals  Description  Treatment/Interventions: Functional transfer training, LE strengthening/ROM, Therapeutic exercise, Endurance training, Bed mobility, Gait training, Spoke to nursing, Spoke to case management, Spoke to advanced practitioner, OT, Family          See flowsheet documentation for full assessment, interventions and recommendations  Outcome: Progressing  Note:   Prognosis: Fair  Problem List: Decreased strength, Decreased endurance, Impaired balance, Decreased mobility, Decreased coordination, Decreased cognition, Impaired judgement, Decreased safety awareness  Assessment: Pt seated in recliner upon arrival, pleasant heather able to amb requesting to use toilet  OPt requires Mod assist x1 for sit to stand transfersd as well as ambualtion  2nd for line assist and chair follow  Pt requires consistent VCs as well as tactile RW movement and progression of foot placement  Pt ambulates excessively slow and is unable to initiate movememnts without cuing  Pt ambualtes 3' toward bathroom and requires seated rest, 2' to toilet and then 3' out of bathrrom needed Huey Nicely to be pulled up behind her  Pt is making slow progress and will continue to benenfit from skilled Pt to further increase strength and endurance and maximize safe fucntional mobility  Barriers to Discharge: Decreased caregiver support     Recommendation: Post acute IP rehab     PT - OK to Discharge: Yes(to rehab when medically ready)    See flowsheet documentation for full assessment

## 2019-09-21 NOTE — PROGRESS NOTES
Progress Note - Em Giordano 1934, 80 y o  female MRN: [id number]    Unit/Bed#: [facility] 727-01 Encounter: [id number]    Primary Care Provider: Moraima Russo MD   Date and time admitted to hospital: 9/19/2019  7:32 AM        * Acute right PCA stroke [facility])  Assessment & Plan  · POD #2 s/p R P1 thrombectomy with Dr Rafael Keith  · Neurosurgery note from 9/20 reviewed  They have cleared for dvt ppx, anti-platelet and anticoagulation--they did not agree with radiology reading of petechial hemorrhage  They have signed off, f/u PRN as outpatient   · MRI Brain pending per their recommendations   · Appreciate neurology input: recommend DOAC when safe  On IV heparin infusion in interim   · PT/OT input appreciated  Will require STR at discharge  CM to obtain referrals  · Avoid restraints as able, patient with intermittent periods of agitation     Atrial fibrillation ([facility] [address] )  Assessment & Plan  · Known hx  Patient reportedly was on xarelto as outpatient but dc after hemoptysis  · With periods of RVR overnight  · Cardiology was consulted, f/u recommendations   · When safe per neurology, would restart DOAC  · Is currently on IV heparin infusion     Aspiration pneumonia ([facility] [address] )  Assessment & Plan  · Suspected  Continue IV abx with ceftriaxone/flagyl  · Will check procalcitonin  · Speech therapy input noted, on dysphagia 3 diet with thin liquids   · Aspiration precautions  · Trend wbc count        VTE Pharmacologic Prophylaxis:   Pharmacologic: Heparin Drip  Mechanical VTE Prophylaxis in Place: Yes    Patient Centered Rounds: I have performed bedside rounds with nursing staff today  Discussions with Specialists or Other Care Team Provider: appreciate specialists input    Education and Discussions with Family / Patient: patient   at bedside    Time Spent for Care: 30 minutes  More than 50% of total time spent on counseling and coordination of care as described above      Current Length of Stay: 2 day(s)    Current Patient Status: Inpatient   Certification Statement: The patient will continue to require additional inpatient hospital stay due to needs MRI B     Discharge Plan: will require STR once medically stable  Code Status: Level 1 - Full Code      Subjective:   Doing okay today  Better than yesterday per  in terms of agitation  Ate lunch  No bleeding on heparin infusion  Objective:     Vitals:   Temp (24hrs), Av 9 °F (37 2 °C), Min:98 6 °F (37 °C), Max:99 5 °F (37 5 °C)    Temp:  [98 6 °F (37 °C)-99 5 °F (37 5 °C)] 98 6 °F (37 °C)  HR:  [] 109  Resp:  [20-24] 20  BP: (115-157)/(50-99) 138/80  SpO2:  [91 %-96 %] 95 %  Body mass index is 28 98 kg/m²  Input and Output Summary (last 24 hours): Intake/Output Summary (Last 24 hours) at 2019 1215  Last data filed at 2019 0800  Gross per 24 hour   Intake 50 ml   Output 534 ml   Net -484 ml       Physical Exam:     Physical Exam   Cardiovascular: Normal rate and regular rhythm  Pulmonary/Chest: Effort normal and breath sounds normal  No respiratory distress  Abdominal: Soft  Bowel sounds are normal    Musculoskeletal: She exhibits edema (LUE )  Neurological: She is alert  Oriented to self and place  Thinks year is 2017  Follows commands, speech clear  Seems mildly confused in regards to hospital situation    Nursing note and vitals reviewed        Additional Data:     Labs:    Results from last 7 days   Lab Units 19  0535   WBC Thousand/uL 13 59*   HEMOGLOBIN g/dL 11 6   HEMATOCRIT % 36 2   PLATELETS Thousands/uL 395*   NEUTROS PCT % 63   LYMPHS PCT % 25   MONOS PCT % 9   EOS PCT % 2     Results from last 7 days   Lab Units 19  0535  19  0812   POTASSIUM mmol/L 3 4*   < >  --    CHLORIDE mmol/L 111*   < >  --    CO2 mmol/L 22   < >  --    CO2, I-STAT mmol/L  --   --  19*   BUN mg/dL 14   < >  --    CREATININE mg/dL 0 68   < >  --    CALCIUM mg/dL 8 1*   < >  --    GLUCOSE, ISTAT mg/dl  --   --  136    < > = values in this interval not displayed  Results from last 7 days   Lab Units 09/20/19  1350   INR  1 16       * I Have Reviewed All Lab Data Listed Above  * Additional Pertinent Lab Tests Reviewed: All Labs Within Last 24 Hours Reviewed    Imaging:    Imaging Reports Reviewed Today Include: all  Imaging Personally Reviewed by Myself Includes:  none    Recent Cultures (last 7 days):           Last 24 Hours Medication List:     Current Facility-Administered Medications:  acetaminophen 650 mg Oral Q6H PRN Nessa Shape, PA-ROZINA    albuterol 2 5 mg Nebulization Q6H PRN Nessa Shape, VIPUL    amitriptyline 25 mg Oral HS Sagar  Cross, PA-ROZINA    amLODIPine 2 5 mg Oral Daily Sagar  Cross, VIPUL    aspirin 81 mg Per NG Tube Daily Sagar M Cross, VIPUL    atorvastatin 40 mg Oral QPM Sagar  Cross, VIPUL    bisacodyl 10 mg Rectal Daily PRN Isabelle Echols PA-C    cefTRIAXone 1,000 mg Intravenous Q24H Sagar M Cross, VIPUL Last Rate: 1,000 mg (09/20/19 1226)   heparin (porcine) 3-20 Units/kg/hr (Order-Specific) Intravenous Titrated Jessica Kramer PA-C Last Rate: 12 Units/kg/hr (09/20/19 1349)   heparin (porcine)   Code/Trauma/Sedation Med Sammi Dinero MD    metoprolol 5 mg Intravenous Q6H PRN Mckenzie E Held, VIPUL    metoprolol 5 mg Intravenous Once Mckenzie E Held, PA-ROZINA    metoprolol tartrate 25 mg Oral Q12H [address] Sagar M Cross, VIPUL    metroNIDAZOLE 500 mg Intravenous [place], [place] Last Rate: 500 mg (09/21/19 1146)   nitroGLYcerin  Intra-arterial Code/Trauma/Sedation Med Sammi Dinero MD    PARoxetine 10 mg Oral Daily Sagar  Cross, VIPUL    polyethylene glycol 17 g Oral Daily PRN Isabelle Echols PA-C    verapamil  Intra-arterial Code/Trauma/Sedation Med Sammi Dinero MD         Today, Patient Was Seen By: Isabelle Echols PA-C    ** Please Note: Dictation voice to text software may have been used in the creation of this document   **

## 2019-09-21 NOTE — ASSESSMENT & PLAN NOTE
· Suspected   Continue IV abx with ceftriaxone/flagyl  · Will check procalcitonin  · Speech therapy input noted, on dysphagia 3 diet with thin liquids   · Aspiration precautions  · Trend wbc count

## 2019-09-21 NOTE — PROGRESS NOTES
Assessment and Plan: Rt PCA (P1 Segment occlusion) - underwent thrombectomy, this is day 2  She is doing well  We will continue Heparin at current regimen, to transition to Pradaxa after post op day 5, provided nothing worrisome noted on brain imaging  Sub: Saw patient this afternoon  Says she is doing well  No acute events overnight  No headache, nausea or vomiting  Cardiology called about anticoagulation, Pradaxa vs Coumadin  Pradaxa might be a better option in her case, considering logistics involved with getting blood work done for INRs      Vitals:    09/21/19 0645 09/21/19 0808 09/21/19 0930 09/21/19 0945   BP: 157/99 138/80     Pulse: (!) 141 (!) 137 (!) 118 (!) 109   Resp:  20     Temp:  98 6 °F (37 °C)     TempSrc:       SpO2: 91% 95% 96% 95%   Weight:         Exam:  Mental status: Alert, Oriented to person and place  CN: II thru XII intact, left visual field cut  Motor: Moves all extremities spontaneously

## 2019-09-21 NOTE — PHYSICAL THERAPY NOTE
Physical Therapy Progress Note     09/21/19 0934   Pain Assessment   Pain Assessment No/denies pain   Pain Score No Pain   Hospital Pain Intervention(s) Medication (See MAR); Repositioned; Ambulation/increased activity   Restrictions/Precautions   Weight Bearing Precautions Per Order No   Other Precautions Cognitive; Chair Alarm; Bed Alarm; Fall Risk;Telemetry;Multiple lines   General   Chart Reviewed Yes   Response to Previous Treatment Patient with no complaints from previous session  Family/Caregiver Present No   Cognition   Overall Cognitive Status Impaired   Arousal/Participation Responsive   Attention Attends with cues to redirect   Orientation Level Oriented to person   Memory Unable to assess   Following Commands Follows one step commands with increased time or repetition   Transfers   Sit to Stand 3  Moderate assistance   Additional items Assist x 1; Increased time required;Armrests; Verbal cues   Stand to Sit 3  Moderate assistance   Additional items Assist x 1; Increased time required;Armrests; Verbal cues   Ambulation/Elevation   Gait pattern Improper Weight shift; Wide LAWRENCE; Decreased foot clearance; Forward Flexion; Excessively slow; Step to;Short stride; Shuffling   Gait Assistance 3  Moderate assist   Additional items Assist x 1;Verbal cues; Tactile cues   Assistive Device Rolling walker   Distance 3'+2'+3'   Endurance Deficit   Endurance Deficit Yes   Activity Tolerance   Activity Tolerance Patient limited by fatigue   Nurse Made Aware VALERIA Rice ok to see   Assessment   Prognosis Fair   Problem List Decreased strength;Decreased endurance; Impaired balance;Decreased mobility; Decreased coordination;Decreased cognition; Impaired judgement;Decreased safety awareness   Assessment Pt seated in recliner upon arrival, pleasant heather able to amb requesting to use toilet  OPt requires Mod assist x1 for sit to stand transfersd as well as ambualtion  2nd for line assist and chair follow   Pt requires consistent VCs as well as tactile RW movement and progression of foot placement  Pt ambulates excessively slow and is unable to initiate movememnts without cuing  Pt ambualtes 3' toward bathroom and requires seated rest, 2' to toilet and then 3' out of bathrrom needed Donzell Eliza to be pulled up behind her  Pt is making slow progress and will continue to benenfit from skilled Pt to further increase strength and endurance and maximize safe fucntional mobility  Barriers to Discharge Decreased caregiver support   Goals   Patient Goals to go to the bathroom   STG Expiration Date 10/04/19   Short Term Goal #1 In 10 sessions pt will: 1  Transfer supine <> sit with Carmencita  2  Transfer sit <> stand with Carmencita and LRAD  3  Sit EOB >20 minutes with supervision for static/dynamic balance  4  Ambulate >20 ft with modA and LRAD  5  Transfer to bedside chair with [address] and [address]  6  Perform LE exercise program   Plan   Treatment/Interventions Functional transfer training;LE strengthening/ROM; Therapeutic exercise; Endurance training;Patient/family training;Bed mobility;Gait training   Progress Progressing toward goals   PT Frequency   (3-6x/week)   Recommendation   Recommendation Post acute IP rehab   Equipment Recommended Walker   PT - OK to Discharge Yes  (to rehab when medically ready)     Joseph Roberts, PTA

## 2019-09-21 NOTE — ASSESSMENT & PLAN NOTE
· Known hx  Patient reportedly was on xarelto as outpatient but dc after hemoptysis    · With periods of RVR overnight  · Cardiology was consulted, f/u recommendations   · When safe per neurology, would restart DOAC  · Is currently on IV heparin infusion

## 2019-09-22 LAB
ANION GAP SERPL CALCULATED.3IONS-SCNC: 8 MMOL/L (ref 4–13)
APTT PPP: 51 SECONDS (ref 23–37)
BASOPHILS # BLD AUTO: 0.15 THOUSANDS/ΜL (ref 0–0.1)
BASOPHILS NFR BLD AUTO: 1 % (ref 0–1)
BUN SERPL-MCNC: 9 MG/DL (ref 5–25)
CALCIUM SERPL-MCNC: 8.3 MG/DL (ref 8.3–10.1)
CHLORIDE SERPL-SCNC: 107 MMOL/L (ref 100–108)
CO2 SERPL-SCNC: 24 MMOL/L (ref 21–32)
CREAT SERPL-MCNC: 0.61 MG/DL (ref 0.6–1.3)
EOSINOPHIL # BLD AUTO: 0.32 THOUSAND/ΜL (ref 0–0.61)
EOSINOPHIL NFR BLD AUTO: 2 % (ref 0–6)
ERYTHROCYTE [DISTWIDTH] IN BLOOD BY AUTOMATED COUNT: 14.4 % (ref 11.6–15.1)
GFR SERPL CREATININE-BSD FRML MDRD: 83 ML/MIN/1.73SQ M
GLUCOSE SERPL-MCNC: 118 MG/DL (ref 65–140)
HCT VFR BLD AUTO: 40.7 % (ref 34.8–46.1)
HGB BLD-MCNC: 13.2 G/DL (ref 11.5–15.4)
IMM GRANULOCYTES # BLD AUTO: 0.09 THOUSAND/UL (ref 0–0.2)
IMM GRANULOCYTES NFR BLD AUTO: 1 % (ref 0–2)
LYMPHOCYTES # BLD AUTO: 3.51 THOUSANDS/ΜL (ref 0.6–4.47)
LYMPHOCYTES NFR BLD AUTO: 23 % (ref 14–44)
MCH RBC QN AUTO: 27.3 PG (ref 26.8–34.3)
MCHC RBC AUTO-ENTMCNC: 32.4 G/DL (ref 31.4–37.4)
MCV RBC AUTO: 84 FL (ref 82–98)
MONOCYTES # BLD AUTO: 1.58 THOUSAND/ΜL (ref 0.17–1.22)
MONOCYTES NFR BLD AUTO: 10 % (ref 4–12)
NEUTROPHILS # BLD AUTO: 9.88 THOUSANDS/ΜL (ref 1.85–7.62)
NEUTS SEG NFR BLD AUTO: 63 % (ref 43–75)
NRBC BLD AUTO-RTO: 0 /100 WBCS
PLATELET # BLD AUTO: 449 THOUSANDS/UL (ref 149–390)
PMV BLD AUTO: 9.8 FL (ref 8.9–12.7)
POTASSIUM SERPL-SCNC: 3.1 MMOL/L (ref 3.5–5.3)
PROCALCITONIN SERPL-MCNC: 0.12 NG/ML
RBC # BLD AUTO: 4.84 MILLION/UL (ref 3.81–5.12)
SODIUM SERPL-SCNC: 139 MMOL/L (ref 136–145)
WBC # BLD AUTO: 15.53 THOUSAND/UL (ref 4.31–10.16)

## 2019-09-22 PROCEDURE — 84145 PROCALCITONIN (PCT): CPT | Performed by: INTERNAL MEDICINE

## 2019-09-22 PROCEDURE — 99232 SBSQ HOSP IP/OBS MODERATE 35: CPT | Performed by: INTERNAL MEDICINE

## 2019-09-22 PROCEDURE — 85730 THROMBOPLASTIN TIME PARTIAL: CPT | Performed by: INTERNAL MEDICINE

## 2019-09-22 PROCEDURE — 80048 BASIC METABOLIC PNL TOTAL CA: CPT | Performed by: INTERNAL MEDICINE

## 2019-09-22 PROCEDURE — 85025 COMPLETE CBC W/AUTO DIFF WBC: CPT | Performed by: INTERNAL MEDICINE

## 2019-09-22 RX ORDER — AMOXICILLIN 250 MG
1 CAPSULE ORAL
Status: DISCONTINUED | OUTPATIENT
Start: 2019-09-22 | End: 2019-09-25 | Stop reason: HOSPADM

## 2019-09-22 RX ORDER — CALCIUM CARBONATE 200(500)MG
1000 TABLET,CHEWABLE ORAL 2 TIMES DAILY PRN
Status: DISCONTINUED | OUTPATIENT
Start: 2019-09-22 | End: 2019-09-25 | Stop reason: HOSPADM

## 2019-09-22 RX ORDER — DIGOXIN 0.25 MG/ML
250 INJECTION INTRAMUSCULAR; INTRAVENOUS EVERY 4 HOURS
Status: COMPLETED | OUTPATIENT
Start: 2019-09-22 | End: 2019-09-22

## 2019-09-22 RX ORDER — POTASSIUM CHLORIDE 20 MEQ/1
20 TABLET, EXTENDED RELEASE ORAL 2 TIMES DAILY
Status: COMPLETED | OUTPATIENT
Start: 2019-09-22 | End: 2019-09-23

## 2019-09-22 RX ORDER — METRONIDAZOLE 500 MG/1
500 TABLET ORAL EVERY 8 HOURS SCHEDULED
Status: DISCONTINUED | OUTPATIENT
Start: 2019-09-22 | End: 2019-09-24

## 2019-09-22 RX ADMIN — PAROXETINE 10 MG: 20 TABLET, FILM COATED ORAL at 07:39

## 2019-09-22 RX ADMIN — POTASSIUM CHLORIDE 20 MEQ: 1500 TABLET, EXTENDED RELEASE ORAL at 11:37

## 2019-09-22 RX ADMIN — CEFTRIAXONE 1000 MG: 1 INJECTION, POWDER, FOR SOLUTION INTRAMUSCULAR; INTRAVENOUS at 12:44

## 2019-09-22 RX ADMIN — METOPROLOL TARTRATE 25 MG: 25 TABLET, FILM COATED ORAL at 16:37

## 2019-09-22 RX ADMIN — ATORVASTATIN CALCIUM 40 MG: 40 TABLET, FILM COATED ORAL at 16:37

## 2019-09-22 RX ADMIN — METRONIDAZOLE 500 MG: 500 INJECTION, SOLUTION INTRAVENOUS at 03:51

## 2019-09-22 RX ADMIN — DIGOXIN 250 MCG: 0.25 INJECTION INTRAMUSCULAR; INTRAVENOUS at 11:41

## 2019-09-22 RX ADMIN — AMLODIPINE BESYLATE 2.5 MG: 2.5 TABLET ORAL at 07:38

## 2019-09-22 RX ADMIN — DIGOXIN 250 MCG: 0.25 INJECTION INTRAMUSCULAR; INTRAVENOUS at 21:50

## 2019-09-22 RX ADMIN — AMIODARONE HYDROCHLORIDE 200 MG: 200 TABLET ORAL at 07:39

## 2019-09-22 RX ADMIN — POTASSIUM CHLORIDE 20 MEQ: 1500 TABLET, EXTENDED RELEASE ORAL at 16:37

## 2019-09-22 RX ADMIN — POLYETHYLENE GLYCOL 3350 17 G: 17 POWDER, FOR SOLUTION ORAL at 07:38

## 2019-09-22 RX ADMIN — DIGOXIN 250 MCG: 0.25 INJECTION INTRAMUSCULAR; INTRAVENOUS at 16:37

## 2019-09-22 RX ADMIN — METRONIDAZOLE 500 MG: 500 INJECTION, SOLUTION INTRAVENOUS at 11:38

## 2019-09-22 RX ADMIN — METOPROLOL TARTRATE 25 MG: 25 TABLET, FILM COATED ORAL at 21:48

## 2019-09-22 RX ADMIN — ASPIRIN 81 MG 81 MG: 81 TABLET ORAL at 07:39

## 2019-09-22 RX ADMIN — METOPROLOL TARTRATE 25 MG: 25 TABLET, FILM COATED ORAL at 07:39

## 2019-09-22 RX ADMIN — HEPARIN SODIUM AND DEXTROSE 12 UNITS/KG/HR: 10000; 5 INJECTION INTRAVENOUS at 11:27

## 2019-09-22 RX ADMIN — METRONIDAZOLE 500 MG: 500 TABLET, FILM COATED ORAL at 21:48

## 2019-09-22 RX ADMIN — SENNOSIDES AND DOCUSATE SODIUM 1 TABLET: 8.6; 5 TABLET ORAL at 21:49

## 2019-09-22 RX ADMIN — AMITRIPTYLINE HYDROCHLORIDE 25 MG: 25 TABLET, FILM COATED ORAL at 21:49

## 2019-09-22 RX ADMIN — METOPROLOL TARTRATE 5 MG: 5 INJECTION, SOLUTION INTRAVENOUS at 03:50

## 2019-09-22 NOTE — QUICK NOTE
Called to floor for pt having some tachycardia hr in the 130 - 140  Upon arrival to the floor pt is with HR of 120s tachycardic occ irregular (afib) on telem  Pt converts multiple times from heart rate of 60s to 120's (afib)  ekg done unable to capture slow hr on ekg but ekg performed Afib with rvr, noted  - potassium this am is 3 4, pt received kcl 40 meq at 11 52am this morning    - will repeat bmp now pending (3 7 will give additiona 40meq po now)   - and monitor as pt has received metoprolol 25mg at po  2040pm  - received iv lopressor 2025pm 5mg   - ?  Tachy ruth vs bb affect concern for pause will not administer additional dose at this time   - will give small dose iv fluids nss now 250ml over 4 hrs

## 2019-09-22 NOTE — PROGRESS NOTES
Progress Note - Danielle Schulte 1934, 80 y o  female MRN: [id number]    Unit/Bed#: [facility] 727-01 Encounter: [id number]    Primary Care Provider: Dee Goodman MD   Date and time admitted to hospital: 9/19/2019  7:32 AM    * Acute right PCA stroke [facility])  Assessment & Plan  · S/p R P1 thrombectomy 9/19/19 with Dr León Carpio  · Neurosurgery note from 9/20 reviewed  They have cleared for dvt ppx, anti-platelet and anticoagulation--they did not agree with radiology reading of petechial hemorrhage  They have signed off, f/u PRN as outpatient   · MRI Brain 9/21: Acute infarctions involving the RIGHT occipital lobe and posterior medial right temporal lobes in the right posterior cerebral artery territory  Acute right thalamic infarction  Discontiguous speckled foci of acute infarction in the parasagittal left frontal lobe in the LEFT anterior cerebral artery territory  Given the multiple vascular territories, a central embolic source should be excluded  · Appreciate neurology input: recommend DOAC, likely pradaxa-- when safe, (POD #5)  · On IV heparin infusion in interim   · PT/OT input appreciated  Will require STR at discharge  CM has made referrals   · Avoid restraints as able, patient with intermittent periods of agitation     Atrial fibrillation ([address] )  Assessment & Plan  · Known hx  Patient reportedly was on xarelto as outpatient but dc after hemoptysis  · With periods of RVR overnight  · Cardiology now following, input appreciated  · Initially started amio 9/21, however 9/22 discontinued in favor of digoxin   · Metoprolol was increased   · IF reverts to amio in interim, can start amio  · Recommending start pradaxa on POD #5 (9/24)  · Is currently on IV heparin infusion     Aspiration pneumonia ([address] )  Assessment & Plan  · Suspected   Continue IV abx with ceftriaxone/flagyl day #4/5  · Procal 0 12, trend in AM  · Speech therapy input noted, on dysphagia 3 diet with thin liquids   · Aspiration precautions  · WBC has remained elevated despite clinical improvement overall, monitor       VTE Pharmacologic Prophylaxis:   Pharmacologic: Heparin Drip  Mechanical VTE Prophylaxis in Place: Yes    Patient Centered Rounds: I have performed bedside rounds with nursing staff today  Discussions with Specialists or Other Care Team Provider:  [address] Cardiology input, discussed with Cardiology attending  Appreciate Neurology input  Appreciate case management input    Education and Discussions with Family / Patient:  Patient  Updated  at bedside    Time Spent for Care: 45 minutes  More than 50% of total time spent on counseling and coordination of care as described above  Current Length of Stay: 3 day(s)    Current Patient Status: Inpatient   Certification Statement: The patient will continue to require additional inpatient hospital stay due to Ongoing management as above, will need to transition to oral anticoagulant on   Need to monitor heart rates, on IV digoxin    Discharge Plan: To short-term rehab when medically stable, not likely until at least     Code Status: Level 1 - Full Code      Subjective:   Doing okay today  States she wants to have a bowel movement get up and walk around  Denies any acute complaints  Was calm overnight did not require any restraints per nursing  Objective:     Vitals:   Temp (24hrs), Av 7 °F (37 1 °C), Min:98 4 °F (36 9 °C), Max:99 °F (37 2 °C)    Temp:  [98 4 °F (36 9 °C)-99 °F (37 2 °C)] 98 4 °F (36 9 °C)  HR:  [] 115  Resp:  [18] 18  BP: (140-160)/(70-92) 157/80  SpO2:  [91 %-95 %] 93 %  Body mass index is 28 98 kg/m²  Input and Output Summary (last 24 hours): Intake/Output Summary (Last 24 hours) at 2019 1150  Last data filed at 2019 1036  Gross per 24 hour   Intake 220 ml   Output 2558 ml   Net -2338 ml       Physical Exam:     Physical Exam   Constitutional: No distress  Cardiovascular: Normal rate   An irregularly irregular rhythm present  Pulmonary/Chest: Effort normal and breath sounds normal  No respiratory distress  Abdominal: Soft  Bowel sounds are normal  She exhibits no distension  Neurological: She is alert  Oriented to self and place  Unclear of year, not oriented to situation   Psychiatric: She has a normal mood and affect  Nursing note and vitals reviewed  Additional Data:     Labs:    Results from last 7 days   Lab Units 09/22/19  0742   WBC Thousand/uL 15 53*   HEMOGLOBIN g/dL 13 2   HEMATOCRIT % 40 7   PLATELETS Thousands/uL 449*   NEUTROS PCT % 63   LYMPHS PCT % 23   MONOS PCT % 10   EOS PCT % 2     Results from last 7 days   Lab Units 09/22/19  0742  09/19/19  0812   POTASSIUM mmol/L 3 1*   < >  --    CHLORIDE mmol/L 107   < >  --    CO2 mmol/L 24   < >  --    CO2, I-STAT mmol/L  --   --  19*   BUN mg/dL 9   < >  --    CREATININE mg/dL 0 61   < >  --    CALCIUM mg/dL 8 3   < >  --    GLUCOSE, ISTAT mg/dl  --   --  136    < > = values in this interval not displayed  Results from last 7 days   Lab Units 09/20/19  1350   INR  1 16       * I Have Reviewed All Lab Data Listed Above  * Additional Pertinent Lab Tests Reviewed: All Labs Within Last 24 Hours Reviewed    Imaging:    Imaging Reports Reviewed Today Include:   All  Imaging Personally Reviewed by Myself Includes:  None    Recent Cultures (last 7 days):           Last 24 Hours Medication List:     Current Facility-Administered Medications:  acetaminophen 650 mg Oral Q6H PRN Mari Rapp PA-C    albuterol 2 5 mg Nebulization Q6H PRN Nadir Kruger PA-C    amitriptyline 25 mg Oral HS Sagar M VIPUL Kruger    amLODIPine 2 5 mg Oral Daily Sagar  VIUPL Kruger    aspirin 81 mg Per NG Tube Daily Sagar M VIPUL Kruger    atorvastatin 40 mg Oral QPM Sagar M VIPUL Kruger    bisacodyl 10 mg Rectal Daily PRN Fletcher Vasquez PA-C    cefTRIAXone 1,000 mg Intravenous Q24H Nadir Kruger PA-C Last Rate: 1,000 mg (09/21/19 1250)   digoxin 250 mcg Intravenous Q4H Adrienne Salinas MD heparin (porcine) 3-20 Units/kg/hr (Order-Specific) Intravenous Titrated Liudmila Shinto, PA-ROZINA Last Rate: 12 Units/kg/hr (09/22/19 1127)   heparin (porcine)   Code/Trauma/Sedation Geovani Davalos MD    metoprolol 5 mg Intravenous Q6H PRN Mckenzie E Held, PA-C    metoprolol 5 mg Intravenous Once Mckenzie E Held, PA-ROZINA    metoprolol tartrate 25 mg Oral TID Kalyani Hernandez, MD    metroNIDAZOLE 500 mg Intravenous [address], VIPUL Last Rate: 500 mg (09/22/19 1138)   nitroGLYcerin  Intra-arterial Code/Trauma/Sedation Geovani Davalos MD    PARoxetine 10 mg Oral Daily Sagar Kruger, VIPUL    polyethylene glycol 17 g Oral Daily PRN Jessica Becker PA-C    potassium chloride 20 mEq Oral BID Kalyani Hernandez MD    senna-docusate sodium 1 tablet Oral HS Jessica Becker PA-C    verapamil  Intra-arterial Code/Trauma/Sedation Geovani Davalos MD         Today, Patient Was Seen By: Jessica Becker PA-C    ** Please Note: Dictation voice to text software may have been used in the creation of this document   **

## 2019-09-22 NOTE — ASSESSMENT & PLAN NOTE
· Suspected   Continue IV abx with ceftriaxone/flagyl day #4/5  · Procal 0 12, trend in AM  · Speech therapy input noted, on dysphagia 3 diet with thin liquids   · Aspiration precautions  · WBC has remained elevated despite clinical improvement overall, monitor

## 2019-09-22 NOTE — PROGRESS NOTES
The IV metronidazole has / have been converted to Oral per [facility] IV-to-PO Auto-Conversion Protocol for Adults as approved by the Pharmacy and Therapeutics Committee (accessible here on MyNET)  The patient met ALL eligible criteria:  1) Age >= 25years old   2) Received at least one dose of the IV form   3) Receiving at least one other scheduled oral/enteral medication   4) Tolerating an oral/enteral diet     And did NOT have any exclusions:   1) Critical care patient   2) Active GI bleed (IF assessing H2RAs or PPIs)   3) Continuous tube feeding (IF assessing cipro, doxycycline, levofloxacin, minocycline, rifampin, or voriconazole)   4) Receiving PO vancomycin (IF assessing metronidazole)   5) Persistent nausea and/or vomiting   6) Ileus or gastrointestinal obstruction   7) Kim/nasogastric tube set for continuous suction   8) Specific order not to automatically convert to PO    Please call the Pharmacy with any questions or concerns  Micha Shetty, Pharm  D , [facility]  Clinical Pharmacist, [address]  [phone] or TigerText

## 2019-09-22 NOTE — PROGRESS NOTES
Cardiology Progress Note - Celio Ferreira 80 y o  female MRN: [id number]    Unit/Bed#: [facility] 727-01 Encounter: [id number]        Subjective:   Reverted to atrial fibrillation  Feels poorly  Review of Systems   Cardiovascular: Negative for chest pain, leg swelling and palpitations  Respiratory: Negative for shortness of breath  Objective:   Vitals: Blood pressure 157/80, pulse (!) 115, temperature 98 4 °F (36 9 °C), resp  rate 18, weight 79 kg (174 lb 2 6 oz), SpO2 93 %  , Body mass index is 28 98 kg/m² , Orthostatic Blood Pressures      Most Recent Value   Blood Pressure  157/80 filed at 09/22/2019 6578         Systolic (76ATR), VTP:682 , Min:140 , GFU:253     Diastolic (48HTW), ELW:08, Min:70, Max:92      Intake/Output Summary (Last 24 hours) at 9/22/2019 1028  Last data filed at 9/22/2019 0700  Gross per 24 hour   Intake 220 ml   Output 2258 ml   Net -2038 ml     Weight (last 2 days)     Date/Time   Weight    09/21/19 0536   79 (174 16)    09/20/19 0600   76 7 (169 09)                  Telemetry Review: Afib with RVR    Physical Exam   Cardiovascular: Normal heart sounds  An irregularly irregular rhythm present  Tachycardia present  Exam reveals no gallop and no friction rub  No murmur heard  Pulmonary/Chest: Breath sounds normal  She has no wheezes  She has no rales  Musculoskeletal: She exhibits no edema           Laboratory Results:        CBC with diff: Results from last 7 days   Lab Units 09/22/19  0742 09/21/19  0535 09/20/19  0610 09/19/19  1137 09/19/19  0812   WBC Thousand/uL 15 53* 13 59* 14 19* 13 42*  --    HEMOGLOBIN g/dL 13 2 11 6 11 0* 11 4*  --    I STAT HEMOGLOBIN g/dl  --   --   --   --  10 9*   HEMATOCRIT % 40 7 36 2 34 9 36 0  --    HEMATOCRIT, ISTAT %  --   --   --   --  32*   MCV fL 84 85 86 87  --    PLATELETS Thousands/uL 449* 395* 372 340  --    MCH pg 27 3 27 3 27 2 27 4  --    MCHC g/dL 32 4 32 0 31 5 31 7  --    RDW % 14 4 14 5 14 5 14 5  --    MPV fL 9 8 10 3 10 4 10 6  -- NRBC AUTO /100 WBCs 0 0 0 0  --          CMP:  Results from last 7 days   Lab Units 09/22/19  0742 09/21/19  2141 09/21/19  0535 09/20/19  0610 09/20/19  0022 09/19/19  1137 09/19/19  0812   POTASSIUM mmol/L 3 1* 3 7 3 4* 4 7 3 2* 3 4*  --    CHLORIDE mmol/L 107 110* 111* 116*  --  114*  --    CO2 mmol/L 24 23 22 21  --  19*  --    CO2, I-STAT mmol/L  --   --   --   --   --   --  19*   BUN mg/dL 9 16 14 12  --  17  --    CREATININE mg/dL 0 61 0 76 0 68 0 60  --  0 61  --    GLUCOSE, ISTAT mg/dl  --   --   --   --   --   --  136   CALCIUM mg/dL 8 3 8 4 8 1* 8 0*  --  7 1*  --    EGFR ml/min/1 73sq m 83 72 80 83  --  83  --          BMP:  Results from last 7 days   Lab Units 09/22/19 0742 09/21/19 2141 09/21/19  0535 09/20/19  0610 09/20/19  0022 09/19/19  1137 09/19/19  0812   POTASSIUM mmol/L 3 1* 3 7 3 4* 4 7 3 2* 3 4*  --    CHLORIDE mmol/L 107 110* 111* 116*  --  114*  --    CO2 mmol/L 24 23 22 21  --  19*  --    CO2, I-STAT mmol/L  --   --   --   --   --   --  19*   BUN mg/dL 9 16 14 12  --  17  --    CREATININE mg/dL 0 61 0 76 0 68 0 60  --  0 61  --    GLUCOSE, ISTAT mg/dl  --   --   --   --   --   --  136   CALCIUM mg/dL 8 3 8 4 8 1* 8 0*  --  7 1*  --        BNP:   Results Reviewed     None        No results for input(s): BNP in the last 72 hours      Magnesium:   Results from last 7 days   Lab Units 09/21/19  0535 09/20/19  0610 09/20/19  0022 09/19/19  1137   MAGNESIUM mg/dL 2 0 2 4 1 8 1 8       Coags:   Results from last 7 days   Lab Units 09/22/19  0742 09/21/19  0222 09/20/19 2019 09/20/19  1350   PTT seconds 51* 62* 58* 28   INR   --   --   --  1 16       TSH:       Lipid Profile:   Results from last 7 days   Lab Units 09/20/19  0610   TRIGLYCERIDES mg/dL 70   HDL mg/dL 54           Cardiac testing:   Results for orders placed during the hospital encounter of 09/19/19   Echo complete with contrast if indicated    [address]  [place], [place] [address]  [phone]    Transthoracic Echocardiogram  2D, M-mode, Doppler, and Color Doppler    Study date:  19-Sep-2019    Patient: Efren Wilson  MR number: [id number]  Account number: [de-identified]  : 1934  Age: 80 years  Gender: Female  Status: Inpatient  Location: Bedside  Height: 65 in  Weight: 169 lb  BP: 145/ 43 mmHg    Indications: CVA  Diagnoses: I67 9 - Cerebrovascular disease, unspecified    Sonographer:  Cedrick Jackson RDCS  Primary Physician:  Efrain Downey MD  Referring Physician:  Bernard Bradford PA-C  Group:  [id number]  Cardiology Fellow:  Stephanie De La Cruz MD  Interpreting Physician:  Ebonie Samaniego MD    SUMMARY    LEFT VENTRICLE:  Systolic function was normal  Ejection fraction was estimated to be 65 %  Doppler parameters were consistent with abnormal left ventricular relaxation (grade 1 diastolic dysfunction)  RIGHT VENTRICLE:  The size was normal   Systolic function was normal     LEFT ATRIUM:  The atrium was mildly dilated  AORTIC VALVE:  There was mild regurgitation  TRICUSPID VALVE:  There was trace regurgitation  Pulmonary artery systolic pressure was within the normal range  HISTORY: PRIOR HISTORY: Asthma  Afib  GERD  Hyperlipidemia  PROCEDURE: The procedure was performed at the bedside  This was a routine study  The transthoracic approach was used  The study included complete 2D imaging, M-mode, complete spectral Doppler, and color Doppler  The heart rate was 75 bpm,  at the start of the study  Images were obtained from the parasternal, apical, subcostal, and suprasternal notch acoustic windows  Image quality was adequate  LEFT VENTRICLE: Size was normal  Systolic function was normal  Ejection fraction was estimated to be 65 %  Although no diagnostic regional wall motion abnormality was identified, this possibility cannot be completely excluded on the basis  of this study   Wall thickness was normal  There was no evidence of concentric hypertrophy  DOPPLER: Doppler parameters were consistent with abnormal left ventricular relaxation (grade 1 diastolic dysfunction)  RIGHT VENTRICLE: The size was normal  Systolic function was normal     LEFT ATRIUM: The atrium was mildly dilated  RIGHT ATRIUM: Size was normal     MITRAL VALVE: There was mild annular calcification  There was normal leaflet separation  DOPPLER: The transmitral velocity was within the normal range  There was no evidence for stenosis  There was trace regurgitation  AORTIC VALVE: The valve was trileaflet  Leaflets exhibited mild calcification, normal cuspal separation, and sclerosis  DOPPLER: Transaortic velocity was within the normal range  There was no evidence for stenosis  There was mild  regurgitation  TRICUSPID VALVE: The valve structure was normal  There was normal leaflet separation  DOPPLER: The transtricuspid velocity was within the normal range  There was no evidence for stenosis  There was trace regurgitation  Pulmonary artery  systolic pressure was within the normal range  Estimated peak PA pressure was 33 mmHg  PULMONIC VALVE: Leaflets exhibited normal thickness, no calcification, and normal cuspal separation  DOPPLER: The transpulmonic velocity was within the normal range  There was no evidence for stenosis  There was trace regurgitation  PERICARDIUM: There was no pericardial effusion  The pericardium was normal in appearance  AORTA: The root exhibited normal size  SYSTEMIC VEINS: IVC: The inferior vena cava was normal in size and course  Respirophasic changes were normal     PULMONARY VEINS: DOPPLER: Doppler flow pattern was normal in the pulmonary vein(s)      SYSTEM MEASUREMENT TABLES    2D mode  AV Diam: 2 9 cm  AoR Diam; Mean (2D): 2 9 cm  LA Diam (2D): 4 cm  LA Dimension; Mean (2D): 4 cm  LA/Ao (2D): 1 38  EDV (2D-Cubed): 64 cm3  EF (2D-Cubed): 69 2 %  ESV (2D-Cubed): 19 7 cm3  FS (2D-Cubed): 32 5 %  FS (2D-Teich): 32 5 %  IVS/LVPW (2D): 1 1  IVSd (2D): 1 1 cm  IVSd; Mean chosen (2D): 1 1 cm  LVIDd (2D): 4 cm  LVIDd; Mean (2D): 4 cm  LVIDs (2D): 2 7 cm  LVIDs; Mean (2D): 2 7 cm  LVPWd (2D): 1 cm  LVPWd; Mean (2D): 1 cm  Left Ventricular Ejection Fraction; Teichholz; 2D mode;: 61 4 %  Left Ventricular End Diastolic Volume; Teichholz; 2D mode;: 70 cm3  Left Ventricular End Systolic Volume; Teichholz; 2D mode;: 27 cm3  SV (2D-Cubed): 44 3 cm3  Stroke Volume; Teichholz; 2D mode;: 43 cm3  RVIDd (2D): 3 1 cm  RVIDd; Mean (2D): 3 1 cm  Right and Left Ventricular End Diastolic Diameter Ratio; 2D mode;: 0 78    Apical four chamber  LV MOD Diam; Recent value; End Diastole (A4C): 4 96 cm  LV MOD Diam; Recent value; End Diastole (A4C): 4 57 cm  LV MOD Diam; Recent value; End Diastole (A4C): 4 57 cm  LV MOD Diam; Recent value; End Diastole (A4C): 4 59 cm  LV MOD Diam; Recent value; End Diastole (A4C): 4 67 cm  LV MOD Diam; Recent value; End Diastole (A4C): 4 65 cm  LV MOD Diam; Recent value; End Diastole (A4C): 4 49 cm  LV MOD Diam; Recent value; End Diastole (A4C): 4 23 cm  LV MOD Diam; Recent value; End Diastole (A4C): 3 94 cm  LV MOD Diam; Recent value; End Diastole (A4C): 3 58 cm  LV MOD Diam; Recent value; End Diastole (A4C): 3 26 cm  LV MOD Diam; Recent value; End Diastole (A4C): 2 82 cm  LV MOD Diam; Recent value; End Diastole (A4C): 2 33 cm  LV MOD Diam; Recent value; End Diastole (A4C): 1 67 cm  LV MOD Diam; Recent value; End Diastole (A4C): 4 88 cm  LV MOD Diam; Recent value; End Diastole (A4C): 4 7 cm  LV MOD Diam; Recent value; End Diastole (A4C): 4 25 cm  LV MOD Diam; Recent value; End Diastole (A4C): 2 19 cm  LV MOD Diam; Recent value; End Diastole (A4C): 4 96 cm  LV MOD Diam; Recent value; End Diastole (A4C): 4 73 cm  LV MOD Diam; Recent value; End Systole (A4C): 2 76 cm  LV MOD Diam; Recent value; End Systole (A4C): 3 48 cm  LV MOD Diam; Recent value; End Systole (A4C): 3 63 cm  LV MOD Diam; Recent value;  End Systole (A4C): 3 61 cm  LV MOD Diam; Recent value; End Systole (A4C): 3 43 cm  LV MOD Diam; Recent value; End Systole (A4C): 3 17 cm  LV MOD Diam; Recent value; End Systole (A4C): 2 84 cm  LV MOD Diam; Recent value; End Systole (A4C): 2 66 cm  LV MOD Diam; Recent value; End Systole (A4C): 2 56 cm  LV MOD Diam; Recent value; End Systole (A4C): 2 51 cm  LV MOD Diam; Recent value; End Systole (A4C): 2 48 cm  LV MOD Diam; Recent value; End Systole (A4C): 2 46 cm  LV MOD Diam; Recent value; End Systole (A4C): 2 41 cm  LV MOD Diam; Recent value; End Systole (A4C): 2 28 cm  LV MOD Diam; Recent value; End Systole (A4C): 2 12 cm  LV MOD Diam; Recent value; End Systole (A4C): 1 94 cm  LV MOD Diam; Recent value; End Systole (A4C): 1 74 cm  LV MOD Diam; Recent value; End Systole (A4C): 1 48 cm  LV MOD Diam; Recent value; End Systole (A4C): 1 15 cm  LV MOD Diam; Recent value; End Systole (A4C): 0 82 cm  LVEF MOD A4C: 65 4 %  Left Ventricle diastolic major axis; Most recent value chosen; Method of Disks, Single Plane; 2D mode; Apical four chamber;: 7 44 cm  Left Ventricle systolic major axis; Most recent value chosen; Method of Disks, Single Plane; 2D mode; Apical four chamber;: 6 52 cm  Left Ventricular Diastolic Area; Most recent value chosen; Method of Disks, Single Plane; 2D mode; Apical four chamber;: 2980 mm2  Left Ventricular End Diastolic Volume; Most recent value chosen; Method of Disks, Single Plane; 2D mode; Apical four chamber;: 99 2 cm3  Left Ventricular End Systolic Volume; Most recent value chosen; Method of Disks, Single Plane; 2D mode; Apical four chamber;: 34 4 cm3  Left Ventricular Systolic Area; Most recent value chosen; Method of Disks, Single Plane; 2D mode;  Apical four chamber;: 1580 mm2  SV MOD A4C: 64 9 cm3    M mode  Inferior Vena Cava Diameter; During Expiration; M mode;: 2 93 cm  Inferior Vena Cava Diameter; Mean; Mean value chosen; During Expiration; M mode;: 2 93 cm  Tricuspid Annular Plane Systolic Excursion; Mean; Mean value chosen; Tricuspid Annulus; M mode;: 1 87 cm  Tricuspid Annular Plane Systolic Excursion; Tricuspid Annulus; M mode;: 1 87 cm    Tissue Doppler Imaging  LV Peak Early Luciano Tissue Irvin; Medial MA (TDI): 49 mm/s  Left Ventricular Peak Early Diastolic Tissue Velocity; Mean; Mean value chosen; Medial Mitral Annulus; Tissue Doppler Imaging;: 49 mm/s    Unspecified Scan Mode  Dec Carlisle; Mean; Regurgitant Flow: 3680 mm/s2  Dec Carlisle; Regurgitant Flow: 3680 mm/s2  PHT; Mean; Regurgitant Flow: 334 ms  PHT; Regurgitant Flow: 334 ms  Peak Grad; Mean; Regurgitant Flow: 71 mm[Hg]  Vmax; Mean; Regurgitant Flow: 4200 mm/s  Vmax; Regurgitant Flow: 4200 mm/s  MV Peak Irvin/LV Peak Tissue Irvin E-Wave; Medial MA: 22 7  DT; Antegrade Flow: 197 ms  DT; Mean; Antegrade Flow: 197 ms  Dec Carlisle; Antegrade Flow: 5610 mm/s2  Dec Carlisle; Mean; Antegrade Flow: 5610 mm/s2  MV A Irvin: 1140 mm/s  MV E Irvin: 1110 mm/s  MV E/A Ratio: 1  MV Peak A Irvin: 1140 mm/s  MV Peak E Irvin; Mean; Antegrade Flow: 1110 mm/s  MVA (PHT): 379 mm2  PHT: 58 ms  PHT; Mean: 58 ms  Peak Grad; Mean; Regurgitant Flow: 27 mm[Hg]  Vmax; Mean; Regurgitant Flow: 2580 mm/s  Vmax; Regurgitant Flow: 2580 mm/s    [facility]    Prepared and electronically signed by    Jay Dennis MD  Signed 19-Sep-2019 16:59:12       No results found for this or any previous visit  No results found for this or any previous visit  No results found for this or any previous visit      Meds/Allergies     Current Facility-Administered Medications:  acetaminophen 650 mg Oral Q6H PRN Vernell Locke PA-C    albuterol 2 5 mg Nebulization Q6H PRN Vernell Locke PA-C    amiodarone 200 mg Oral TID With Meals JANAK Wen    [START ON 9/29/2019] amiodarone 200 mg Oral Daily With Breakfast JANAK Page    amitriptyline 25 mg Oral HS Sagar Kruger PA-C    amLODIPine 2 5 mg Oral Daily Sagar Kruger PA-C    aspirin 81 mg Per NG Tube Daily Malorie Teague VIPUL Kruger    atorvastatin 40 mg Oral QPM Jessy Kruger PA-C    bisacodyl 10 mg Rectal Daily PRN Charu Fermin PA-C    cefTRIAXone 1,000 mg Intravenous Q24H Jessy Sinha PA-C Last Rate: 1,000 mg (09/21/19 1250)   heparin (porcine) 3-20 Units/kg/hr (Order-Specific) Intravenous Titrated Melody Reyes PA-C Last Rate: 12 Units/kg/hr (09/21/19 1415)   heparin (porcine)   Code/Trauma/Sedation Med Jason Huang MD    metoprolol 5 mg Intravenous Q6H PRN Mckenzie E Held, VIPUL    metoprolol 5 mg Intravenous Once Mckenzie E Held, VIPUL    metoprolol tartrate 25 mg Oral Q12H [address] Sagar Kruger PA-C    metroNIDAZOLE 500 mg Intravenous [address], [place] Last Rate: 500 mg (09/22/19 0351)   nitroGLYcerin  Intra-arterial Code/Trauma/Sedation Med Jason Huang MD    PARoxetine 10 mg Oral Daily Sagar Kruger PA-C    polyethylene glycol 17 g Oral Daily PRN Charu Fermin PA-C    verapamil  Intra-arterial Code/Trauma/Sedation Med Jason Huang MD        heparin (porcine) 3-20 Units/kg/hr (Order-Specific) Last Rate: 12 Units/kg/hr (09/21/19 1415)     Medications Prior to Admission   Medication    acetaminophen-codeine (TYLENOL #3) 300-30 mg per tablet    aspirin 325 mg tablet    atorvastatin (LIPITOR) 40 mg tablet    cephalexin (KEFLEX) 500 mg capsule    clopidogrel (PLAVIX) 75 mg tablet    levofloxacin (LEVAQUIN) 750 mg tablet    metoprolol succinate (TOPROL-XL) 25 mg 24 hr tablet    PARoxetine (PAXIL) 10 mg tablet    amitriptyline (ELAVIL) 25 mg tablet    amLODIPine (NORVASC) 2 5 mg tablet    ibuprofen (ADVIL) 200 mg tablet    simvastatin (ZOCOR) 10 mg tablet       Assessment:  Principal Problem:    Acute right PCA stroke (HCC)  Active Problems:    Aspiration pneumonia (HCC)    Atrial fibrillation (HCC)      Impression:  1  PAF - patient with CVA when not anticoagulated    Given that patient reverted to atrial fibrillation, will hold on amiodarone until safe to anticoagulate, which will be POD#5 (currently POD #2)   Until then will proceed with a rate control strategy       Recommendations:  1  Stop amiodarone  2  Increase metoprolol  3  Replete potassium  4  Start digoxin IV  5  Start anticoagulation on 9/25  Will need to find which NOAC is covered besides Xarelto - had significant bleeding  6  If reverts to NSR in the interim, can start amio

## 2019-09-22 NOTE — PLAN OF CARE
Problem: Prexisting or High Potential for Compromised Skin Integrity  Goal: Skin integrity is maintained or improved  Description  INTERVENTIONS:  - Identify patients at risk for skin breakdown  - Assess and monitor skin integrity  - Assess and monitor nutrition and hydration status  - Monitor labs   - Assess for incontinence   - Turn and reposition patient  - Assist with mobility/ambulation  - Relieve pressure over bony prominences  - Avoid friction and shearing  - Provide appropriate hygiene as needed including keeping skin clean and dry  - Evaluate need for skin moisturizer/barrier cream  - Collaborate with interdisciplinary team   - Patient/family teaching  - Consider wound care consult   Outcome: Progressing     Problem: Neurological Deficit  Goal: Neurological status is stable or improving  Description  Interventions:  - Monitor and assess patient's level of consciousness, motor function, sensory function, and level of assistance needed for ADLs  - Monitor and report changes from baseline  Collaborate with interdisciplinary team to initiate plan and implement interventions as ordered  - Provide and maintain a safe environment  - Consider seizure precautions  - Consider fall precautions  - Consider aspiration precautions  - Consider bleeding precautions  Outcome: Progressing     Problem: Activity Intolerance/Impaired Mobility  Goal: Mobility/activity is maintained at optimum level for patient  Description  Interventions:  - Assess and monitor patient  barriers to mobility and need for assistive/adaptive devices  - Assess patient's emotional response to limitations  - Collaborate with interdisciplinary team and initiate plans and interventions as ordered  - Encourage independent activity per ability   - Maintain proper body alignment  - Perform active/passive rom as tolerated/ordered    - Plan activities to conserve energy   - Turn patient as appropriate  Outcome: Progressing     Problem: Nutrition  Goal: Nutrition/Hydration status is improving  Description  Monitor and assess patient's nutrition/hydration status for malnutrition (ex- brittle hair, bruises, dry skin, pale skin and conjunctiva, muscle wasting, smooth red tongue, and disorientation)  Collaborate with interdisciplinary team and initiate plan and interventions as ordered  Monitor patient's weight and dietary intake as ordered or per policy  Utilize nutrition screening tool and intervene per policy  Determine patient's food preferences and provide high-protein, high-caloric foods as appropriate  - Assist patient with eating   - Allow adequate time for meals   - Encourage patient to take dietary supplement as ordered  - Collaborate with clinical nutritionist   - Include patient/family/caregiver in decisions related to nutrition  Outcome: Progressing     Problem: CONFUSION/THOUGHT DISTURBANCE  Goal: Thought disturbances (confusion, delirium, depression, dementia or psychosis) are managed to maintain or return to baseline mental status and functional level  Description  INTERVENTIONS:  - Assess for possible contributors to  thought disturbance, including but not limited to medications, infection, impaired vision or hearing, underlying metabolic abnormalities, dehydration, respiratory compromise,  psychiatric diagnoses and notify attending PHYSICAN/AP  - Monitor and intervene to maintain adequate nutrition, hydration, elimination, sleep and activity  - Decrease environmental stimuli, including noise as appropriate  - Provide frequent contacts to provide refocusing, direction and reassurance as needed  Approach patient calmly with eye contact and at their level    - [place] high risk fall precautions, aspiration precautions and other safety measures, as indicated  - If delirium suspected, notify physician/AP of change in condition and request immediate in-person evaluation  - Pursue consults as appropriate including Geriatric (campus dependent), OT for cognitive evaluation/activity planning, psychiatric, pastoral care, etc   Outcome: Progressing     Problem: Potential for Falls  Goal: Patient will remain free of falls  Description  INTERVENTIONS:  - Assess patient frequently for physical needs  -  Identify cognitive and physical deficits and behaviors that affect risk of falls    -  [place] fall precautions as indicated by assessment   - Educate patient/family on patient safety including physical limitations  - Instruct patient to call for assistance with activity based on assessment  - Modify environment to reduce risk of injury  - Consider OT/PT consult to assist with strengthening/mobility  Outcome: Progressing     Problem: PAIN - ADULT  Goal: Verbalizes/displays adequate comfort level or baseline comfort level  Description  Interventions:  - Encourage patient to monitor pain and request assistance  - Assess pain using appropriate pain scale  - Administer analgesics based on type and severity of pain and evaluate response  - Implement non-pharmacological measures as appropriate and evaluate response  - Notify physician/advanced practitioner if interventions unsuccessful or patient reports new pain   Outcome: Progressing     Problem: INFECTION - ADULT  Goal: Absence or prevention of progression during hospitalization  Description  INTERVENTIONS:  - Assess and monitor for signs and symptoms of infection  - Monitor lab/diagnostic results  - Monitor all insertion sites, i e  indwelling lines, tubes, and drains  - [place] appropriate cooling/warming therapies per order  - Administer medications as ordered  - Instruct and encourage patient and family to use good hand hygiene technique  - Identify and instruct in appropriate isolation precautions for identified infection/condition   Outcome: Progressing     Problem: SAFETY ADULT  Goal: Patient will remain free of falls  Description  INTERVENTIONS:  - Assess patient frequently for physical needs  -  Identify cognitive and physical deficits and behaviors that affect risk of falls  -  [place] fall precautions as indicated by assessment   - Educate patient/family on patient safety including physical limitations  - Instruct patient to call for assistance with activity based on assessment  - Modify environment to reduce risk of injury  - Consider OT/PT consult to assist with strengthening/mobility  Outcome: Progressing     Problem: DISCHARGE PLANNING  Goal: Discharge to home or other facility with appropriate resources  Description  INTERVENTIONS:  - Identify barriers to discharge w/patient and caregiver  - Arrange for needed discharge resources and transportation as appropriate  - Identify discharge learning needs (meds, wound care, etc )  - Refer to Case Management Department for coordinating discharge planning if the patient needs post-hospital services based on physician/advanced practitioner order or complex needs related to functional status, cognitive ability, or social support system   Outcome: Progressing     Problem: Knowledge Deficit  Goal: Patient/family/caregiver demonstrates understanding of disease process, treatment plan, medications, and discharge instructions  Description  Complete learning assessment and assess knowledge base    Interventions:  - Provide teaching at level of understanding  - Provide teaching via preferred learning methods  Outcome: Progressing

## 2019-09-22 NOTE — ASSESSMENT & PLAN NOTE
· S/p R P1 thrombectomy 9/19/19 with Dr Max Loomis  · Neurosurgery note from 9/20 reviewed  They have cleared for dvt ppx, anti-platelet and anticoagulation--they did not agree with radiology reading of petechial hemorrhage  They have signed off, f/u PRN as outpatient   · MRI Brain 9/21: Acute infarctions involving the RIGHT occipital lobe and posterior medial right temporal lobes in the right posterior cerebral artery territory  Acute right thalamic infarction  Discontiguous speckled foci of acute infarction in the parasagittal left frontal lobe in the LEFT anterior cerebral artery territory  Given the multiple vascular territories, a central embolic source should be excluded  · Appreciate neurology input: recommend DOAC, likely pradaxa-- when safe, (POD #5)  · On IV heparin infusion in interim   · PT/OT input appreciated  Will require STR at discharge   CM has made referrals   · Avoid restraints as able, patient with intermittent periods of agitation

## 2019-09-22 NOTE — ASSESSMENT & PLAN NOTE
· Known hx  Patient reportedly was on xarelto as outpatient but dc after hemoptysis    · With periods of RVR overnight  · Cardiology now following, input appreciated  · Initially started amio 9/21, however 9/22 discontinued in favor of digoxin   · Metoprolol was increased   · IF reverts to amio in interim, can start amio  · Recommending start pradaxa on POD #5 (9/24)  · Is currently on IV heparin infusion

## 2019-09-23 LAB
ANION GAP SERPL CALCULATED.3IONS-SCNC: 6 MMOL/L (ref 4–13)
APTT PPP: 48 SECONDS (ref 23–37)
APTT PPP: 51 SECONDS (ref 23–37)
ATRIAL RATE: 98 BPM
BASOPHILS # BLD AUTO: 0.11 THOUSANDS/ΜL (ref 0–0.1)
BASOPHILS NFR BLD AUTO: 1 % (ref 0–1)
BUN SERPL-MCNC: 13 MG/DL (ref 5–25)
CALCIUM SERPL-MCNC: 8.8 MG/DL (ref 8.3–10.1)
CHLORIDE SERPL-SCNC: 106 MMOL/L (ref 100–108)
CO2 SERPL-SCNC: 25 MMOL/L (ref 21–32)
CREAT SERPL-MCNC: 0.76 MG/DL (ref 0.6–1.3)
EOSINOPHIL # BLD AUTO: 0.57 THOUSAND/ΜL (ref 0–0.61)
EOSINOPHIL NFR BLD AUTO: 4 % (ref 0–6)
ERYTHROCYTE [DISTWIDTH] IN BLOOD BY AUTOMATED COUNT: 14.1 % (ref 11.6–15.1)
GFR SERPL CREATININE-BSD FRML MDRD: 72 ML/MIN/1.73SQ M
GLUCOSE SERPL-MCNC: 109 MG/DL (ref 65–140)
HCT VFR BLD AUTO: 42.7 % (ref 34.8–46.1)
HGB BLD-MCNC: 13.6 G/DL (ref 11.5–15.4)
IMM GRANULOCYTES # BLD AUTO: 0.09 THOUSAND/UL (ref 0–0.2)
IMM GRANULOCYTES NFR BLD AUTO: 1 % (ref 0–2)
LYMPHOCYTES # BLD AUTO: 3.81 THOUSANDS/ΜL (ref 0.6–4.47)
LYMPHOCYTES NFR BLD AUTO: 23 % (ref 14–44)
MAGNESIUM SERPL-MCNC: 2 MG/DL (ref 1.6–2.6)
MCH RBC QN AUTO: 27.4 PG (ref 26.8–34.3)
MCHC RBC AUTO-ENTMCNC: 31.9 G/DL (ref 31.4–37.4)
MCV RBC AUTO: 86 FL (ref 82–98)
MONOCYTES # BLD AUTO: 1.55 THOUSAND/ΜL (ref 0.17–1.22)
MONOCYTES NFR BLD AUTO: 10 % (ref 4–12)
NEUTROPHILS # BLD AUTO: 10.2 THOUSANDS/ΜL (ref 1.85–7.62)
NEUTS SEG NFR BLD AUTO: 61 % (ref 43–75)
NRBC BLD AUTO-RTO: 0 /100 WBCS
PLATELET # BLD AUTO: 472 THOUSANDS/UL (ref 149–390)
PMV BLD AUTO: 9.9 FL (ref 8.9–12.7)
POTASSIUM SERPL-SCNC: 3.6 MMOL/L (ref 3.5–5.3)
PROCALCITONIN SERPL-MCNC: 0.12 NG/ML
QRS AXIS: 38 DEGREES
QRSD INTERVAL: 86 MS
QT INTERVAL: 326 MS
QTC INTERVAL: 450 MS
RBC # BLD AUTO: 4.96 MILLION/UL (ref 3.81–5.12)
SODIUM SERPL-SCNC: 137 MMOL/L (ref 136–145)
T WAVE AXIS: 12 DEGREES
VENTRICULAR RATE: 115 BPM
WBC # BLD AUTO: 16.33 THOUSAND/UL (ref 4.31–10.16)

## 2019-09-23 PROCEDURE — 99233 SBSQ HOSP IP/OBS HIGH 50: CPT | Performed by: INTERNAL MEDICINE

## 2019-09-23 PROCEDURE — 80048 BASIC METABOLIC PNL TOTAL CA: CPT | Performed by: INTERNAL MEDICINE

## 2019-09-23 PROCEDURE — 84145 PROCALCITONIN (PCT): CPT | Performed by: INTERNAL MEDICINE

## 2019-09-23 PROCEDURE — 99232 SBSQ HOSP IP/OBS MODERATE 35: CPT | Performed by: INTERNAL MEDICINE

## 2019-09-23 PROCEDURE — 85730 THROMBOPLASTIN TIME PARTIAL: CPT | Performed by: INTERNAL MEDICINE

## 2019-09-23 PROCEDURE — 99232 SBSQ HOSP IP/OBS MODERATE 35: CPT | Performed by: PSYCHIATRY & NEUROLOGY

## 2019-09-23 PROCEDURE — 85025 COMPLETE CBC W/AUTO DIFF WBC: CPT | Performed by: INTERNAL MEDICINE

## 2019-09-23 PROCEDURE — 83735 ASSAY OF MAGNESIUM: CPT | Performed by: INTERNAL MEDICINE

## 2019-09-23 PROCEDURE — 93010 ELECTROCARDIOGRAM REPORT: CPT | Performed by: INTERNAL MEDICINE

## 2019-09-23 RX ORDER — AMIODARONE HYDROCHLORIDE 200 MG/1
200 TABLET ORAL 2 TIMES DAILY WITH MEALS
Status: DISCONTINUED | OUTPATIENT
Start: 2019-09-23 | End: 2019-09-25 | Stop reason: HOSPADM

## 2019-09-23 RX ADMIN — POLYETHYLENE GLYCOL 3350 17 G: 17 POWDER, FOR SOLUTION ORAL at 08:00

## 2019-09-23 RX ADMIN — METRONIDAZOLE 500 MG: 500 TABLET, FILM COATED ORAL at 21:24

## 2019-09-23 RX ADMIN — AMITRIPTYLINE HYDROCHLORIDE 25 MG: 25 TABLET, FILM COATED ORAL at 21:24

## 2019-09-23 RX ADMIN — METOPROLOL TARTRATE 25 MG: 25 TABLET, FILM COATED ORAL at 21:26

## 2019-09-23 RX ADMIN — METRONIDAZOLE 500 MG: 500 TABLET, FILM COATED ORAL at 06:06

## 2019-09-23 RX ADMIN — PAROXETINE 10 MG: 20 TABLET, FILM COATED ORAL at 08:00

## 2019-09-23 RX ADMIN — CEFTRIAXONE 1000 MG: 1 INJECTION, POWDER, FOR SOLUTION INTRAMUSCULAR; INTRAVENOUS at 12:33

## 2019-09-23 RX ADMIN — NYSTATIN 500000 UNITS: 100000 SUSPENSION ORAL at 22:09

## 2019-09-23 RX ADMIN — METOPROLOL TARTRATE 25 MG: 25 TABLET, FILM COATED ORAL at 16:21

## 2019-09-23 RX ADMIN — ATORVASTATIN CALCIUM 40 MG: 40 TABLET, FILM COATED ORAL at 16:21

## 2019-09-23 RX ADMIN — AMLODIPINE BESYLATE 2.5 MG: 2.5 TABLET ORAL at 08:00

## 2019-09-23 RX ADMIN — ASPIRIN 81 MG 81 MG: 81 TABLET ORAL at 08:00

## 2019-09-23 RX ADMIN — POTASSIUM CHLORIDE 20 MEQ: 1500 TABLET, EXTENDED RELEASE ORAL at 08:00

## 2019-09-23 RX ADMIN — SENNOSIDES AND DOCUSATE SODIUM 1 TABLET: 8.6; 5 TABLET ORAL at 21:24

## 2019-09-23 RX ADMIN — AMIODARONE HYDROCHLORIDE 200 MG: 200 TABLET ORAL at 16:21

## 2019-09-23 RX ADMIN — HEPARIN SODIUM AND DEXTROSE 13 UNITS/KG/HR: 10000; 5 INJECTION INTRAVENOUS at 12:37

## 2019-09-23 RX ADMIN — METRONIDAZOLE 500 MG: 500 TABLET, FILM COATED ORAL at 14:02

## 2019-09-23 RX ADMIN — METOPROLOL TARTRATE 25 MG: 25 TABLET, FILM COATED ORAL at 08:00

## 2019-09-23 NOTE — SOCIAL WORK
Received voice message from [facility] with [facility] rehab who stated that pt is accepted at their facility pending bed availability when pt is medically stable

## 2019-09-23 NOTE — ASSESSMENT & PLAN NOTE
· Known hx  Patient reportedly was on xarelto as outpatient but dc after hemoptysis  · Cardiology following, input appreciated  · Initially started amio 9/21, however 9/22 discontinued and given a digoxin load x3 doses  · Converted to NSR this morning   · Cardiology recommending oral amiodarone, 200 mg BID  First dose tonight  · GIB on Xarelto, Eliquis too expensive  Do not want warfarin due frequent lab work  CM looking into cost of Pradaxa  · Currently on heparin gtt for anticoagulation- modified aPTT goal of 50-70    · Recommending start pradaxa on POD #5 (9/24)  · Is currently on IV heparin infusion- increased to 13 units/hr today d/t subtherapuetic PTT at 48- on modified infusion with goal PTT of 50-70 per neuro

## 2019-09-23 NOTE — PROGRESS NOTES
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 19  In setting of inadequate anticoagulation for PAF  Had been without anticoagulation x 2 weeks  Can resume oral anticoagulation day #5 post CVA () per neurology  2  Persistent atrial fibrillation  NSR at time of initial evaluation, started on oral amiodarone load in attempt to maintain sinus rhythm  Pt reverted back to afib with RVR yesterday and oral amiodarone stopped  Loaded with digoxin yesterday- 250 mcg IV x 3 doses  Converted to NSR this morning at 0935  Start oral amiodarone, 200 mg BID  First dose tonight  GIB on Xarelto, Eliquis too expensive  Do not want warfarin due frequent lab work  CM looking into cost of Pradaxa  Currently on heparin gtt for anticoagulation- modified aPTT goal of 50-70      3  HTN- stable, 24 hour average /65  On amlodipine 2 5 mg daily, metoprolol tartrate 25 mg BID  4  Aspiration pneumonia- WBC count 16  Antibiotics per primary team      Subjective  "I feel weak "  Review of Systems   Constitution: Negative for chills and fever  Cardiovascular: Negative for chest pain, dyspnea on exertion, leg swelling and palpitations  Respiratory: Negative for cough and shortness of breath  Gastrointestinal: Negative for nausea and vomiting  Neurological: Positive for weakness  Negative for dizziness  All other systems reviewed and are negative  Objective:   Vitals: Blood pressure 126/67, pulse 85, temperature 98 4 °F (36 9 °C), resp  rate 16, weight 77 5 kg (170 lb 13 7 oz), SpO2 96 %  ,       Body mass index is 28 43 kg/m²  ,     Systolic (13CJA), TTI:754 , Min:126 , IW     Diastolic (81CIT), KVI:56, Min:59, Max:73      Intake/Output Summary (Last 24 hours) at 201937  Last data filed at 2019 0001  Gross per 24 hour   Intake 730 ml   Output 300 ml   Net 430 ml     Weight (last 2 days)     Date/Time   Weight    09/23/19 0600   77 5 (170 86)    09/21/19 0536   79 (174 16)            Telemetry Review: No significant arrhythmias seen on telemetry review  AFib overnight, converted to NSR at 0935  Physical Exam   Constitutional: She is oriented to person, place, and time  Elderly female, sitting OOB in chair  NAD  Neck: Neck supple  No JVD present  Carotid bruit is not present  Cardiovascular: Normal rate, regular rhythm and normal heart sounds  Exam reveals no gallop and no friction rub  No murmur heard  Pulmonary/Chest: Effort normal  No respiratory distress  She has no rales  Room air  Abdominal: Soft  Bowel sounds are normal    Musculoskeletal: She exhibits no edema  Neurological: She is alert and oriented to person, place, and time  Skin: Skin is warm and dry  She is not diaphoretic       LABORATORY RESULTS      CBC with diff: Results from last 7 days   Lab Units 09/23/19  0745 09/22/19  0742 09/21/19  0535 09/20/19  0610 09/19/19  1137 09/19/19  0812   WBC Thousand/uL 16 33* 15 53* 13 59* 14 19* 13 42*  --    HEMOGLOBIN g/dL 13 6 13 2 11 6 11 0* 11 4*  --    I STAT HEMOGLOBIN g/dl  --   --   --   --   --  10 9*   HEMATOCRIT % 42 7 40 7 36 2 34 9 36 0  --    HEMATOCRIT, ISTAT %  --   --   --   --   --  32*   MCV fL 86 84 85 86 87  --    PLATELETS Thousands/uL 472* 449* 395* 372 340  --    MCH pg 27 4 27 3 27 3 27 2 27 4  --    MCHC g/dL 31 9 32 4 32 0 31 5 31 7  --    RDW % 14 1 14 4 14 5 14 5 14 5  --    MPV fL 9 9 9 8 10 3 10 4 10 6  --    NRBC AUTO /100 WBCs 0 0 0 0 0  --      CMP:  Results from last 7 days   Lab Units 09/23/19  0745 09/22/19  0742 09/21/19  2141 09/21/19  0535 09/20/19  0610 09/20/19  0022 09/19/19  1137 09/19/19  0812   POTASSIUM mmol/L 3 6 3 1* 3 7 3 4* 4 7 3 2* 3 4*  --    CHLORIDE mmol/L 106 107 110* 111* 116*  --  114*  --    CO2 mmol/L 25 24 23 22 21  --  19*  --    CO2, I-STAT mmol/L  -- --   --   --   --   --   --  19*   BUN mg/dL 13 9 16 14 12  --  17  --    CREATININE mg/dL 0 76 0 61 0 76 0 68 0 60  --  0 61  --    GLUCOSE, ISTAT mg/dl  --   --   --   --   --   --   --  136   CALCIUM mg/dL 8 8 8 3 8 4 8 1* 8 0*  --  7 1*  --    EGFR ml/min/1 73sq m 72 83 72 80 83  --  83  --      BMP:  Results from last 7 days   Lab Units 19  0745 19  0742 19  2141 19  0535 19  0610 19  0022 19  1137 19  0812   POTASSIUM mmol/L 3 6 3 1* 3 7 3 4* 4 7 3 2* 3 4*  --    CHLORIDE mmol/L 106 107 110* 111* 116*  --  114*  --    CO2 mmol/L 25 24 23 22 21  --  19*  --    CO2, I-STAT mmol/L  --   --   --   --   --   --   --  19*   BUN mg/dL 13 9 16 14 12  --  17  --    CREATININE mg/dL 0 76 0 61 0 76 0 68 0 60  --  0 61  --    GLUCOSE, ISTAT mg/dl  --   --   --   --   --   --   --  136   CALCIUM mg/dL 8 8 8 3 8 4 8 1* 8 0*  --  7 1*  --       Results from last 7 days   Lab Units 19  0745 19  0535 19  0610 19  0022 19  1137   MAGNESIUM mg/dL 2 0 2 0 2 4 1 8 1 8      Results from last 7 days   Lab Units 19  0610   HEMOGLOBIN A1C % 5 5        Results from last 7 days   Lab Units 19  1350   INR  1 16     Lipid Profile:   No results found for: CHOL  Lab Results   Component Value Date    HDL 54 2019     Lab Results   Component Value Date    LDLCALC 47 2019     Lab Results   Component Value Date    TRIG 70 2019     Cardiac testing:   Results for orders placed during the hospital encounter of 19   Echo complete with contrast if indicated    Narrative [facility] [address]  [address] [place] [address]  [phone]    Transthoracic Echocardiogram  2D, M-mode, Doppler, and Color Doppler    Study date:  19-Sep-2019    Patient: Silverio Cee  MR number: [id number]  Account number: [de-identified]  : 1934  Age: 80 years  Gender: Female  Status: Inpatient  Location: Bedside  Height: 65 in  Weight: 169 lb  BP: 145/ 43 mmHg    Indications: CVA  Diagnoses: I67 9 - Cerebrovascular disease, unspecified    Sonographer:  Frederick Choi RDCS  Primary Physician:  Edd Mchugh MD  Referring Physician:  Wilfrid Steward PA-C  Group:  [id number]  Cardiology Fellow:  Mariah Renee MD  Interpreting Physician:  Mario Cesar MD    SUMMARY    LEFT VENTRICLE:  Systolic function was normal  Ejection fraction was estimated to be 65 %  Doppler parameters were consistent with abnormal left ventricular relaxation (grade 1 diastolic dysfunction)  RIGHT VENTRICLE:  The size was normal   Systolic function was normal     LEFT ATRIUM:  The atrium was mildly dilated  AORTIC VALVE:  There was mild regurgitation  TRICUSPID VALVE:  There was trace regurgitation  Pulmonary artery systolic pressure was within the normal range  HISTORY: PRIOR HISTORY: Asthma  Afib  GERD  Hyperlipidemia  PROCEDURE: The procedure was performed at the bedside  This was a routine study  The transthoracic approach was used  The study included complete 2D imaging, M-mode, complete spectral Doppler, and color Doppler  The heart rate was 75 bpm,  at the start of the study  Images were obtained from the parasternal, apical, subcostal, and suprasternal notch acoustic windows  Image quality was adequate  LEFT VENTRICLE: Size was normal  Systolic function was normal  Ejection fraction was estimated to be 65 %  Although no diagnostic regional wall motion abnormality was identified, this possibility cannot be completely excluded on the basis  of this study  Wall thickness was normal  There was no evidence of concentric hypertrophy  DOPPLER: Doppler parameters were consistent with abnormal left ventricular relaxation (grade 1 diastolic dysfunction)  RIGHT VENTRICLE: The size was normal  Systolic function was normal     LEFT ATRIUM: The atrium was mildly dilated      RIGHT ATRIUM: Size was normal     MITRAL VALVE: There was mild annular calcification  There was normal leaflet separation  DOPPLER: The transmitral velocity was within the normal range  There was no evidence for stenosis  There was trace regurgitation  AORTIC VALVE: The valve was trileaflet  Leaflets exhibited mild calcification, normal cuspal separation, and sclerosis  DOPPLER: Transaortic velocity was within the normal range  There was no evidence for stenosis  There was mild  regurgitation  TRICUSPID VALVE: The valve structure was normal  There was normal leaflet separation  DOPPLER: The transtricuspid velocity was within the normal range  There was no evidence for stenosis  There was trace regurgitation  Pulmonary artery  systolic pressure was within the normal range  Estimated peak PA pressure was 33 mmHg  PULMONIC VALVE: Leaflets exhibited normal thickness, no calcification, and normal cuspal separation  DOPPLER: The transpulmonic velocity was within the normal range  There was no evidence for stenosis  There was trace regurgitation  PERICARDIUM: There was no pericardial effusion  The pericardium was normal in appearance  AORTA: The root exhibited normal size  SYSTEMIC VEINS: IVC: The inferior vena cava was normal in size and course  Respirophasic changes were normal     PULMONARY VEINS: DOPPLER: Doppler flow pattern was normal in the pulmonary vein(s)  SYSTEM MEASUREMENT TABLES    2D mode  AV Diam: 2 9 cm  AoR Diam; Mean (2D): 2 9 cm  LA Diam (2D): 4 cm  LA Dimension; Mean (2D): 4 cm  LA/Ao (2D): 1 38  EDV (2D-Cubed): 64 cm3  EF (2D-Cubed): 69 2 %  ESV (2D-Cubed): 19 7 cm3  FS (2D-Cubed): 32 5 %  FS (2D-Teich): 32 5 %  IVS/LVPW (2D): 1 1  IVSd (2D): 1 1 cm  IVSd; Mean chosen (2D): 1 1 cm  LVIDd (2D): 4 cm  LVIDd; Mean (2D): 4 cm  LVIDs (2D): 2 7 cm  LVIDs; Mean (2D): 2 7 cm  LVPWd (2D): 1 cm  LVPWd; Mean (2D): 1 cm  Left Ventricular Ejection Fraction;  Teichholz; 2D mode;: 61 4 %  Left Ventricular End Diastolic Volume; Teichholz; 2D mode;: 70 cm3  Left Ventricular End Systolic Volume; Teichholz; 2D mode;: 27 cm3  SV (2D-Cubed): 44 3 cm3  Stroke Volume; Teichholz; 2D mode;: 43 cm3  RVIDd (2D): 3 1 cm  RVIDd; Mean (2D): 3 1 cm  Right and Left Ventricular End Diastolic Diameter Ratio; 2D mode;: 0 78    Apical four chamber  LV MOD Diam; Recent value; End Diastole (A4C): 4 96 cm  LV MOD Diam; Recent value; End Diastole (A4C): 4 57 cm  LV MOD Diam; Recent value; End Diastole (A4C): 4 57 cm  LV MOD Diam; Recent value; End Diastole (A4C): 4 59 cm  LV MOD Diam; Recent value; End Diastole (A4C): 4 67 cm  LV MOD Diam; Recent value; End Diastole (A4C): 4 65 cm  LV MOD Diam; Recent value; End Diastole (A4C): 4 49 cm  LV MOD Diam; Recent value; End Diastole (A4C): 4 23 cm  LV MOD Diam; Recent value; End Diastole (A4C): 3 94 cm  LV MOD Diam; Recent value; End Diastole (A4C): 3 58 cm  LV MOD Diam; Recent value; End Diastole (A4C): 3 26 cm  LV MOD Diam; Recent value; End Diastole (A4C): 2 82 cm  LV MOD Diam; Recent value; End Diastole (A4C): 2 33 cm  LV MOD Diam; Recent value; End Diastole (A4C): 1 67 cm  LV MOD Diam; Recent value; End Diastole (A4C): 4 88 cm  LV MOD Diam; Recent value; End Diastole (A4C): 4 7 cm  LV MOD Diam; Recent value; End Diastole (A4C): 4 25 cm  LV MOD Diam; Recent value; End Diastole (A4C): 2 19 cm  LV MOD Diam; Recent value; End Diastole (A4C): 4 96 cm  LV MOD Diam; Recent value; End Diastole (A4C): 4 73 cm  LV MOD Diam; Recent value; End Systole (A4C): 2 76 cm  LV MOD Diam; Recent value; End Systole (A4C): 3 48 cm  LV MOD Diam; Recent value; End Systole (A4C): 3 63 cm  LV MOD Diam; Recent value; End Systole (A4C): 3 61 cm  LV MOD Diam; Recent value; End Systole (A4C): 3 43 cm  LV MOD Diam; Recent value; End Systole (A4C): 3 17 cm  LV MOD Diam; Recent value; End Systole (A4C): 2 84 cm  LV MOD Diam; Recent value; End Systole (A4C): 2 66 cm  LV MOD Diam; Recent value;  End Systole (A4C): 2 56 cm  LV MOD Diam; Recent value; End Systole (A4C): 2 51 cm  LV MOD Diam; Recent value; End Systole (A4C): 2 48 cm  LV MOD Diam; Recent value; End Systole (A4C): 2 46 cm  LV MOD Diam; Recent value; End Systole (A4C): 2 41 cm  LV MOD Diam; Recent value; End Systole (A4C): 2 28 cm  LV MOD Diam; Recent value; End Systole (A4C): 2 12 cm  LV MOD Diam; Recent value; End Systole (A4C): 1 94 cm  LV MOD Diam; Recent value; End Systole (A4C): 1 74 cm  LV MOD Diam; Recent value; End Systole (A4C): 1 48 cm  LV MOD Diam; Recent value; End Systole (A4C): 1 15 cm  LV MOD Diam; Recent value; End Systole (A4C): 0 82 cm  LVEF MOD A4C: 65 4 %  Left Ventricle diastolic major axis; Most recent value chosen; Method of Disks, Single Plane; 2D mode; Apical four chamber;: 7 44 cm  Left Ventricle systolic major axis; Most recent value chosen; Method of Disks, Single Plane; 2D mode; Apical four chamber;: 6 52 cm  Left Ventricular Diastolic Area; Most recent value chosen; Method of Disks, Single Plane; 2D mode; Apical four chamber;: 2980 mm2  Left Ventricular End Diastolic Volume; Most recent value chosen; Method of Disks, Single Plane; 2D mode; Apical four chamber;: 99 2 cm3  Left Ventricular End Systolic Volume; Most recent value chosen; Method of Disks, Single Plane; 2D mode; Apical four chamber;: 34 4 cm3  Left Ventricular Systolic Area; Most recent value chosen; Method of Disks, Single Plane; 2D mode;  Apical four chamber;: 1580 mm2  SV MOD A4C: 64 9 cm3    M mode  Inferior Vena Cava Diameter; During Expiration; M mode;: 2 93 cm  Inferior Vena Cava Diameter; Mean; Mean value chosen; During Expiration; M mode;: 2 93 cm  Tricuspid Annular Plane Systolic Excursion; Mean; Mean value chosen; Tricuspid Annulus; M mode;: 1 87 cm  Tricuspid Annular Plane Systolic Excursion; Tricuspid Annulus; M mode;: 1 87 cm    Tissue Doppler Imaging  LV Peak Early Luciano Tissue Irvin; Medial MA (TDI): 49 mm/s  Left Ventricular Peak Early Diastolic Tissue Velocity; Mean; Mean value chosen; Medial Mitral Annulus; Tissue Doppler Imaging;: 49 mm/s    Unspecified Scan Mode  Dec Bacon; Mean; Regurgitant Flow: 3680 mm/s2  Dec Bacon; Regurgitant Flow: 3680 mm/s2  PHT; Mean; Regurgitant Flow: 334 ms  PHT; Regurgitant Flow: 334 ms  Peak Grad; Mean; Regurgitant Flow: 71 mm[Hg]  Vmax; Mean; Regurgitant Flow: 4200 mm/s  Vmax; Regurgitant Flow: 4200 mm/s  MV Peak Irvin/LV Peak Tissue Irvin E-Wave; Medial MA: 22 7  DT; Antegrade Flow: 197 ms  DT; Mean; Antegrade Flow: 197 ms  Dec Bacon; Antegrade Flow: 5610 mm/s2  Dec Bacon; Mean; Antegrade Flow: 5610 mm/s2  MV A Irvin: 1140 mm/s  MV E Irvin: 1110 mm/s  MV E/A Ratio: 1  MV Peak A Irvin: 1140 mm/s  MV Peak E Irvin; Mean; Antegrade Flow: 1110 mm/s  MVA (PHT): 379 mm2  PHT: 58 ms  PHT;  Mean: 58 ms  Peak Grad; Mean; Regurgitant Flow: 27 mm[Hg]  Vmax; Mean; Regurgitant Flow: 2580 mm/s  Vmax; Regurgitant Flow: 2580 mm/s    [facility]    Prepared and electronically signed by    Ramya Leone MD  Signed 19-Sep-2019 16:59:12     Meds/Allergies   all current active meds have been reviewed and current meds:   Current Facility-Administered Medications   Medication Dose Route Frequency    acetaminophen (TYLENOL) tablet 650 mg  650 mg Oral Q6H PRN    albuterol inhalation solution 2 5 mg  2 5 mg Nebulization Q6H PRN    amitriptyline (ELAVIL) tablet 25 mg  25 mg Oral HS    amLODIPine (NORVASC) tablet 2 5 mg  2 5 mg Oral Daily    aspirin chewable tablet 81 mg  81 mg Per NG Tube Daily    atorvastatin (LIPITOR) tablet 40 mg  40 mg Oral QPM    bisacodyl (DULCOLAX) rectal suppository 10 mg  10 mg Rectal Daily PRN    calcium carbonate (TUMS) chewable tablet 1,000 mg  1,000 mg Oral BID PRN    cefTRIAXone (ROCEPHIN) 1,000 mg in dextrose 5 % 50 mL IVPB  1,000 mg Intravenous Q24H    heparin (porcine) 25,000 units in 250 mL infusion (premix)  3-20 Units/kg/hr (Order-Specific) Intravenous Titrated    heparin (porcine) injection Code/Trauma/Sedation Med    metoprolol (LOPRESSOR) injection 5 mg  5 mg Intravenous Q6H PRN    metoprolol (LOPRESSOR) injection 5 mg  5 mg Intravenous Once    metoprolol tartrate (LOPRESSOR) tablet 25 mg  25 mg Oral TID    metroNIDAZOLE (FLAGYL) tablet 500 mg  500 mg Oral Q8H [address]    nitroGLYcerin (TRIDIL) 50 mg in 250 mL infusion (premix)   Intra-arterial Code/Trauma/Sedation Med    PARoxetine (PAXIL) tablet 10 mg  10 mg Oral Daily    polyethylene glycol (MIRALAX) packet 17 g  17 g Oral Daily PRN    senna-docusate sodium (SENOKOT S) 8 6-50 mg per tablet 1 tablet  1 tablet Oral HS    verapamil (ISOPTIN) injection   Intra-arterial Code/Trauma/Sedation Med     Medications Prior to Admission   Medication    acetaminophen-codeine (TYLENOL #3) 300-30 mg per tablet    aspirin 325 mg tablet    atorvastatin (LIPITOR) 40 mg tablet    cephalexin (KEFLEX) 500 mg capsule    clopidogrel (PLAVIX) 75 mg tablet    levofloxacin (LEVAQUIN) 750 mg tablet    metoprolol succinate (TOPROL-XL) 25 mg 24 hr tablet    PARoxetine (PAXIL) 10 mg tablet    amitriptyline (ELAVIL) 25 mg tablet    amLODIPine (NORVASC) 2 5 mg tablet    ibuprofen (ADVIL) 200 mg tablet    simvastatin (ZOCOR) 10 mg tablet       heparin (porcine) 3-20 Units/kg/hr (Order-Specific) Last Rate: 12 Units/kg/hr (09/22/19 1127)     Counseling / Coordination of Care  Total floor / unit time spent today 20 minutes  Greater than 50% of total time was spent with the patient and / or family counseling and / or coordination of care  ** Please Note: Dragon 360 Dictation voice to text software may have been used in the creation of this document   **

## 2019-09-23 NOTE — PLAN OF CARE
Problem: Prexisting or High Potential for Compromised Skin Integrity  Goal: Skin integrity is maintained or improved  Description  INTERVENTIONS:  - Identify patients at risk for skin breakdown  - Assess and monitor skin integrity  - Assess and monitor nutrition and hydration status  - Monitor labs   - Assess for incontinence   - Turn and reposition patient  - Assist with mobility/ambulation  - Relieve pressure over bony prominences  - Avoid friction and shearing  - Provide appropriate hygiene as needed including keeping skin clean and dry  - Evaluate need for skin moisturizer/barrier cream  - Collaborate with interdisciplinary team   - Patient/family teaching  - Consider wound care consult   Outcome: Progressing     Problem: Neurological Deficit  Goal: Neurological status is stable or improving  Description  Interventions:  - Monitor and assess patient's level of consciousness, motor function, sensory function, and level of assistance needed for ADLs  - Monitor and report changes from baseline  Collaborate with interdisciplinary team to initiate plan and implement interventions as ordered  - Provide and maintain a safe environment  - Consider seizure precautions  - Consider fall precautions  - Consider aspiration precautions  - Consider bleeding precautions  Outcome: Progressing     Problem: Activity Intolerance/Impaired Mobility  Goal: Mobility/activity is maintained at optimum level for patient  Description  Interventions:  - Assess and monitor patient  barriers to mobility and need for assistive/adaptive devices  - Assess patient's emotional response to limitations  - Collaborate with interdisciplinary team and initiate plans and interventions as ordered  - Encourage independent activity per ability   - Maintain proper body alignment  - Perform active/passive rom as tolerated/ordered    - Plan activities to conserve energy   - Turn patient as appropriate  Outcome: Progressing     Problem: Nutrition  Goal: Nutrition/Hydration status is improving  Description  Monitor and assess patient's nutrition/hydration status for malnutrition (ex- brittle hair, bruises, dry skin, pale skin and conjunctiva, muscle wasting, smooth red tongue, and disorientation)  Collaborate with interdisciplinary team and initiate plan and interventions as ordered  Monitor patient's weight and dietary intake as ordered or per policy  Utilize nutrition screening tool and intervene per policy  Determine patient's food preferences and provide high-protein, high-caloric foods as appropriate  - Assist patient with eating   - Allow adequate time for meals   - Encourage patient to take dietary supplement as ordered  - Collaborate with clinical nutritionist   - Include patient/family/caregiver in decisions related to nutrition  Outcome: Progressing     Problem: CONFUSION/THOUGHT DISTURBANCE  Goal: Thought disturbances (confusion, delirium, depression, dementia or psychosis) are managed to maintain or return to baseline mental status and functional level  Description  INTERVENTIONS:  - Assess for possible contributors to  thought disturbance, including but not limited to medications, infection, impaired vision or hearing, underlying metabolic abnormalities, dehydration, respiratory compromise,  psychiatric diagnoses and notify attending PHYSICAN/AP  - Monitor and intervene to maintain adequate nutrition, hydration, elimination, sleep and activity  - Decrease environmental stimuli, including noise as appropriate  - Provide frequent contacts to provide refocusing, direction and reassurance as needed  Approach patient calmly with eye contact and at their level    - [place] high risk fall precautions, aspiration precautions and other safety measures, as indicated  - If delirium suspected, notify physician/AP of change in condition and request immediate in-person evaluation  - Pursue consults as appropriate including Geriatric (campus dependent), OT for cognitive evaluation/activity planning, psychiatric, pastoral care, etc   Outcome: Progressing     Problem: Potential for Falls  Goal: Patient will remain free of falls  Description  INTERVENTIONS:  - Assess patient frequently for physical needs  -  Identify cognitive and physical deficits and behaviors that affect risk of falls    -  [place] fall precautions as indicated by assessment   - Educate patient/family on patient safety including physical limitations  - Instruct patient to call for assistance with activity based on assessment  - Modify environment to reduce risk of injury  - Consider OT/PT consult to assist with strengthening/mobility  Outcome: Progressing     Problem: PAIN - ADULT  Goal: Verbalizes/displays adequate comfort level or baseline comfort level  Description  Interventions:  - Encourage patient to monitor pain and request assistance  - Assess pain using appropriate pain scale  - Administer analgesics based on type and severity of pain and evaluate response  - Implement non-pharmacological measures as appropriate and evaluate response  - Notify physician/advanced practitioner if interventions unsuccessful or patient reports new pain   Outcome: Progressing     Problem: INFECTION - ADULT  Goal: Absence or prevention of progression during hospitalization  Description  INTERVENTIONS:  - Assess and monitor for signs and symptoms of infection  - Monitor lab/diagnostic results  - Monitor all insertion sites, i e  indwelling lines, tubes, and drains  - [place] appropriate cooling/warming therapies per order  - Administer medications as ordered  - Instruct and encourage patient and family to use good hand hygiene technique  - Identify and instruct in appropriate isolation precautions for identified infection/condition   Outcome: Progressing     Problem: SAFETY ADULT  Goal: Patient will remain free of falls  Description  INTERVENTIONS:  - Assess patient frequently for physical needs  -  Identify cognitive and physical deficits and behaviors that affect risk of falls  -  [place] fall precautions as indicated by assessment   - Educate patient/family on patient safety including physical limitations  - Instruct patient to call for assistance with activity based on assessment  - Modify environment to reduce risk of injury  - Consider OT/PT consult to assist with strengthening/mobility  Outcome: Progressing     Problem: DISCHARGE PLANNING  Goal: Discharge to home or other facility with appropriate resources  Description  INTERVENTIONS:  - Identify barriers to discharge w/patient and caregiver  - Arrange for needed discharge resources and transportation as appropriate  - Identify discharge learning needs (meds, wound care, etc )  - Refer to Case Management Department for coordinating discharge planning if the patient needs post-hospital services based on physician/advanced practitioner order or complex needs related to functional status, cognitive ability, or social support system   Outcome: Progressing     Problem: Knowledge Deficit  Goal: Patient/family/caregiver demonstrates understanding of disease process, treatment plan, medications, and discharge instructions  Description  Complete learning assessment and assess knowledge base    Interventions:  - Provide teaching at level of understanding  - Provide teaching via preferred learning methods  Outcome: Progressing

## 2019-09-23 NOTE — QUICK NOTE
Reviewed chart, patient on stroke pathway  Met with patient to discuss follow up care with outpatient neurology and stroke education  Explained nurse navigator role to patient  Answers questions appropriately, is oriented  Discharge planning per CM  Plan at this time is for patient to be discharged to inpatient rehab prior to returning home with  Fredy Tanner  States Fredy Pinedaarielle manages medications, appointments, and follow up information  States I am to contact him to follow up after discharge  Provided her a stroke education binder and my card  She verbalizes competency in stroke types, symptoms, risk factors/managment, and resources  She does not have any questions or concerns at this time  Will follow up after discharge

## 2019-09-23 NOTE — RESTORATIVE TECHNICIAN NOTE
Restorative Specialist Mobility Note       Activity: Ambulate in rosario, Ambulate in room, Dangle, Stand at bedside, Other (Comment)(Educated and encourage pt to ambulate with assitance 3-4 times per day  Pt was in chair with call bell and tray within reach  Chair alarm armed)     Assistive Device: (HHA x 2)                      Renee Bansal

## 2019-09-23 NOTE — SOCIAL WORK
Received phone call from Sarita Gordon liaison for [facility], who stated that they have a bed available on Wednesday for pt  CM informed her that she was informed by would be medically stable for dc on Wednesday

## 2019-09-23 NOTE — PROGRESS NOTES
Called lab about PTT not resulted  Per Lab personnel lab was not received  Per PCA she sent the lab draw  Will redraw PTT at this time  Called lab and spoke to Rebeca Davies  Per amador they have the blood work that was drawn at [facility]

## 2019-09-23 NOTE — SOCIAL WORK
Met with pt and her  to discuss pt's discharge plan  They stated they reviewed the inpt rehab list and prefer a referral to [address] [facility]   Discussed option of acute rehab and they were agreeable  Provided them with a list of acute rehab facilities to provide Freedom of Choice  [facility] a [facility] [address]  [facility]  They stated their order of preference is 1  [facility], 2, [facility], and 3  [facility] referral sent for same

## 2019-09-23 NOTE — ASSESSMENT & PLAN NOTE
· S/p R P1 thrombectomy 9/19/19 with Dr Tamra Anton  · Neurosurgery note from 9/20 reviewed  They have cleared for dvt ppx, anti-platelet and anticoagulation--they did not agree with radiology reading of petechial hemorrhage  They have signed off, f/u PRN as outpatient   · MRI Brain 9/21: Acute infarctions involving the RIGHT occipital lobe and posterior medial right temporal lobes in the right posterior cerebral artery territory  Acute right thalamic infarction  Discontiguous speckled foci of acute infarction in the parasagittal left frontal lobe in the LEFT anterior cerebral artery territory  Given the multiple vascular territories, a central embolic source should be excluded  · Appreciate neurology input: recommend DOAC, likely pradaxa-- when safe, (POD #5)  · On IV heparin infusion in interim- goal PTT 50-70  · PT/OT input appreciated  Will require STR at discharge   CM has made referrals   · Avoid restraints as able, patient with intermittent periods of agitation

## 2019-09-23 NOTE — PROGRESS NOTES
Progress Note - Sergei Casitllo 1934, 80 y o  female MRN: [id number]    Unit/Bed#: [facility] 727-01 Encounter: [id number]    Primary Care Provider: Malou Freeman MD   Date and time admitted to hospital: 9/19/2019  7:32 AM        * Acute right PCA stroke [facility])  Assessment & Plan  · S/p R P1 thrombectomy 9/19/19 with Dr Tal Rodriguez  · Neurosurgery note from 9/20 reviewed  They have cleared for dvt ppx, anti-platelet and anticoagulation--they did not agree with radiology reading of petechial hemorrhage  They have signed off, f/u PRN as outpatient   · MRI Brain 9/21: Acute infarctions involving the RIGHT occipital lobe and posterior medial right temporal lobes in the right posterior cerebral artery territory  Acute right thalamic infarction  Discontiguous speckled foci of acute infarction in the parasagittal left frontal lobe in the LEFT anterior cerebral artery territory  Given the multiple vascular territories, a central embolic source should be excluded  · Appreciate neurology input: recommend DOAC, likely pradaxa-- when safe, (POD #5)  · On IV heparin infusion in interim- goal PTT 50-70  · PT/OT input appreciated  Will require STR at discharge  CM has made referrals   · Avoid restraints as able, patient with intermittent periods of agitation     Aspiration pneumonia ([facility] [address] )  Assessment & Plan  · Suspected  Continue IV abx with ceftriaxone and PO flagyl day #5/5  · Procal 0 12  · Speech therapy input noted, on dysphagia 3 diet with thin liquids   · Aspiration precautions  · WBC has remained elevated despite clinical improvement overall, monitor     Atrial fibrillation (HCC)  Assessment & Plan  · Known hx  Patient reportedly was on xarelto as outpatient but dc after hemoptysis  · Cardiology following, input appreciated  · Initially started amio 9/21, however 9/22 discontinued and given a digoxin load x3 doses  · Converted to NSR this morning   · Cardiology recommending oral amiodarone, 200 mg BID   First dose tonight  · GIB on Xarelto, Eliquis too expensive  Do not want warfarin due frequent lab work  CM looking into cost of Pradaxa  · Currently on heparin gtt for anticoagulation- modified aPTT goal of 50-70  · Recommending start pradaxa on POD #5 ()  · Is currently on IV heparin infusion- increased to 13 units/hr today d/t subtherapuetic PTT at 48- on modified infusion with goal PTT of 50-70 per neuro       VTE Pharmacologic Prophylaxis:   Pharmacologic: Heparin Drip  Mechanical VTE Prophylaxis in Place: Yes    Patient Centered Rounds: I have performed bedside rounds with nursing staff today  Discussions with Specialists or Other Care Team Provider:  d/w RN  D/w cardiology, d/w Valentina ALTAMIRANO and Dr Ml Arauz with neurology     Education and Discussions with Family / Patient: d/w patient  Message left for   Time Spent for Care: 45 minutes  More than 50% of total time spent on counseling and coordination of care as described above  Current Length of Stay: 4 day(s)    Current Patient Status: Inpatient   Certification Statement: The patient will continue to require additional inpatient hospital stay due to iv heparin gtt     Discharge Plan: possible d/c on  to STR    Code Status: Level 1 - Full Code      Subjective:   Pt sitting in the chair  Pt reports she feels tired and weak  Denies any other complaints  Per RN constipated  No overnight events per RN     Objective:     Vitals:   Temp (24hrs), Av 6 °F (37 °C), Min:98 4 °F (36 9 °C), Max:98 6 °F (37 °C)    Temp:  [98 4 °F (36 9 °C)-98 6 °F (37 °C)] 98 4 °F (36 9 °C)  HR:  [66-85] 85  Resp:  [16] 16  BP: (126-147)/(59-73) 126/67  SpO2:  [94 %-96 %] 96 %  Body mass index is 28 43 kg/m²  Input and Output Summary (last 24 hours):        Intake/Output Summary (Last 24 hours) at 2019 1217  Last data filed at 2019 0901  Gross per 24 hour   Intake 390 ml   Output 1050 ml   Net -660 ml       Physical Exam:     Physical Exam Constitutional: She appears well-developed  No distress  Neck: Neck supple  Cardiovascular: Normal rate, regular rhythm, normal heart sounds and intact distal pulses  No murmur heard  Pulmonary/Chest: Effort normal and breath sounds normal  No respiratory distress  She has no wheezes  Fine crackles right lower lobe    Abdominal: Soft  Bowel sounds are normal  She exhibits no distension  There is no tenderness  There is no rebound  Musculoskeletal: She exhibits no edema or tenderness  Neurological: She is alert  No cranial nerve deficit  Oriented to person and place, year 1990   Skin: Skin is warm and dry  No rash noted  She is not diaphoretic  No erythema  Psychiatric:   flat       Additional Data:     Labs:    Results from last 7 days   Lab Units 09/23/19  0745   WBC Thousand/uL 16 33*   HEMOGLOBIN g/dL 13 6   HEMATOCRIT % 42 7   PLATELETS Thousands/uL 472*   NEUTROS PCT % 61   LYMPHS PCT % 23   MONOS PCT % 10   EOS PCT % 4     Results from last 7 days   Lab Units 09/23/19  0745   SODIUM mmol/L 137   POTASSIUM mmol/L 3 6   CHLORIDE mmol/L 106   CO2 mmol/L 25   BUN mg/dL 13   CREATININE mg/dL 0 76   ANION GAP mmol/L 6   CALCIUM mg/dL 8 8   GLUCOSE RANDOM mg/dL 109     Results from last 7 days   Lab Units 09/20/19  1350   INR  1 16         Results from last 7 days   Lab Units 09/20/19  0610   HEMOGLOBIN A1C % 5 5     Results from last 7 days   Lab Units 09/23/19  0745 09/22/19  0742   PROCALCITONIN ng/ml 0 12 0 12           * I Have Reviewed All Lab Data Listed Above  * Additional Pertinent Lab Tests Reviewed:  All Labs Within Last 24 Hours Reviewed    Imaging:    Imaging Reports Reviewed Today Include:  MRI of the brain, chest x-ray, CT head    Recent Cultures (last 7 days):           Last 24 Hours Medication List:     Current Facility-Administered Medications:  acetaminophen 650 mg Oral Q6H PRN Vernell Locke PA-C    albuterol 2 5 mg Nebulization Q6H PRN Vernell Locke PA-C    amiodarone 200 mg Oral BID With Meals JANAK Mak    amitriptyline 25 mg Oral HS Sagar JESSICA VIPUL Kruger    amLODIPine 2 5 mg Oral Daily [facility] [place]    aspirin 81 mg Per NG Tube Daily Anthony Wanda Kruger PA-C    atorvastatin 40 mg Oral QPM Sagar JESSICA VIPUL Kruger    bisacodyl 10 mg Rectal Daily PRN Oj Araiza PA-C    calcium carbonate 1,000 mg Oral BID PRN Marjuni Amado PA-C    cefTRIAXone 1,000 mg Intravenous Q24H Anthony Sinha PA-C Last Rate: Stopped (09/22/19 1315)   heparin (porcine) 3-20 Units/kg/hr (Order-Specific) Intravenous Titrated Yamilet Richardson PA-C Last Rate: 13 Units/kg/hr (09/23/19 1030)   heparin (porcine)   Code/Trauma/Sedation Geovani Seaman MD    metoprolol 5 mg Intravenous Q6H PRN Mckenzie E Held, PA-C    metoprolol 5 mg Intravenous Once Mckenzie E Held, VIPUL    metoprolol tartrate 25 mg Oral TID Vin Maddox MD    metroNIDAZOLE 500 mg Oral [facility] Thania Oconnell MD    nitroGLYcerin  Intra-arterial Code/Trauma/Sedation Geovani Seaman MD    PARoxetine 10 mg Oral Daily Sagar M VIPUL Kruger    polyethylene glycol 17 g Oral Daily PRN Oj Araiza PA-C    senna-docusate sodium 1 tablet Oral HS Oj Araiza PA-C    verapamil  Intra-arterial Code/Trauma/Sedation Geovani Seaman MD         Today, Patient Was Seen By: JANAK Alvarez    ** Please Note: Dictation voice to text software may have been used in the creation of this document   **

## 2019-09-23 NOTE — SOCIAL WORK
Informed by JANAK Kirk that pt will be possibly discharged to home on Pradaxa and wanted to have pt's copy cost checked  Prescription received  Cm contacted pt's pharmacy - [facility] [phone] - and spoke with Ole Fraga to have pt's copay cost checked  She stated that pt's copay cost is $86 37  CM met with pt and her  to discuss the copay cost   They were reluctantly agreeable to the cost   They stated they will try it while pt is at rehab to see if pt tolerates it and then he would be willing to pay the copay cost as he stated he doesn't have another option  Cm informed JANAK Kirk that pt and  are agreeable to the copay cost of Pradaxa

## 2019-09-23 NOTE — ASSESSMENT & PLAN NOTE
· Suspected   Continue IV abx with ceftriaxone and PO flagyl day #5/5  · Procal 0 12  · Speech therapy input noted, on dysphagia 3 diet with thin liquids   · Aspiration precautions  · WBC has remained elevated despite clinical improvement overall, monitor

## 2019-09-23 NOTE — PLAN OF CARE
Problem: Prexisting or High Potential for Compromised Skin Integrity  Goal: Skin integrity is maintained or improved  Description  INTERVENTIONS:  - Identify patients at risk for skin breakdown  - Assess and monitor skin integrity  - Assess and monitor nutrition and hydration status  - Monitor labs   - Assess for incontinence   - Turn and reposition patient  - Assist with mobility/ambulation  - Relieve pressure over bony prominences  - Avoid friction and shearing  - Provide appropriate hygiene as needed including keeping skin clean and dry  - Evaluate need for skin moisturizer/barrier cream  - Collaborate with interdisciplinary team   - Patient/family teaching  - Consider wound care consult   Outcome: Progressing     Problem: Neurological Deficit  Goal: Neurological status is stable or improving  Description  Interventions:  - Monitor and assess patient's level of consciousness, motor function, sensory function, and level of assistance needed for ADLs  - Monitor and report changes from baseline  Collaborate with interdisciplinary team to initiate plan and implement interventions as ordered  - Provide and maintain a safe environment  - Consider seizure precautions  - Consider fall precautions  - Consider aspiration precautions  - Consider bleeding precautions  Outcome: Progressing     Problem: Activity Intolerance/Impaired Mobility  Goal: Mobility/activity is maintained at optimum level for patient  Description  Interventions:  - Assess and monitor patient  barriers to mobility and need for assistive/adaptive devices  - Assess patient's emotional response to limitations  - Collaborate with interdisciplinary team and initiate plans and interventions as ordered  - Encourage independent activity per ability   - Maintain proper body alignment  - Perform active/passive rom as tolerated/ordered    - Plan activities to conserve energy   - Turn patient as appropriate  Outcome: Progressing     Problem: Nutrition  Goal: Nutrition/Hydration status is improving  Description  Monitor and assess patient's nutrition/hydration status for malnutrition (ex- brittle hair, bruises, dry skin, pale skin and conjunctiva, muscle wasting, smooth red tongue, and disorientation)  Collaborate with interdisciplinary team and initiate plan and interventions as ordered  Monitor patient's weight and dietary intake as ordered or per policy  Utilize nutrition screening tool and intervene per policy  Determine patient's food preferences and provide high-protein, high-caloric foods as appropriate  - Assist patient with eating   - Allow adequate time for meals   - Encourage patient to take dietary supplement as ordered  - Collaborate with clinical nutritionist   - Include patient/family/caregiver in decisions related to nutrition  Outcome: Progressing     Problem: CONFUSION/THOUGHT DISTURBANCE  Goal: Thought disturbances (confusion, delirium, depression, dementia or psychosis) are managed to maintain or return to baseline mental status and functional level  Description  INTERVENTIONS:  - Assess for possible contributors to  thought disturbance, including but not limited to medications, infection, impaired vision or hearing, underlying metabolic abnormalities, dehydration, respiratory compromise,  psychiatric diagnoses and notify attending PHYSICAN/AP  - Monitor and intervene to maintain adequate nutrition, hydration, elimination, sleep and activity  - Decrease environmental stimuli, including noise as appropriate  - Provide frequent contacts to provide refocusing, direction and reassurance as needed  Approach patient calmly with eye contact and at their level    - [place] high risk fall precautions, aspiration precautions and other safety measures, as indicated  - If delirium suspected, notify physician/AP of change in condition and request immediate in-person evaluation  - Pursue consults as appropriate including Geriatric (campus dependent), OT for cognitive evaluation/activity planning, psychiatric, pastoral care, etc   Outcome: Progressing     Problem: Potential for Falls  Goal: Patient will remain free of falls  Description  INTERVENTIONS:  - Assess patient frequently for physical needs  -  Identify cognitive and physical deficits and behaviors that affect risk of falls    -  [place] fall precautions as indicated by assessment   - Educate patient/family on patient safety including physical limitations  - Instruct patient to call for assistance with activity based on assessment  - Modify environment to reduce risk of injury  - Consider OT/PT consult to assist with strengthening/mobility  Outcome: Progressing     Problem: PAIN - ADULT  Goal: Verbalizes/displays adequate comfort level or baseline comfort level  Description  Interventions:  - Encourage patient to monitor pain and request assistance  - Assess pain using appropriate pain scale  - Administer analgesics based on type and severity of pain and evaluate response  - Implement non-pharmacological measures as appropriate and evaluate response  - Notify physician/advanced practitioner if interventions unsuccessful or patient reports new pain   Outcome: Progressing     Problem: INFECTION - ADULT  Goal: Absence or prevention of progression during hospitalization  Description  INTERVENTIONS:  - Assess and monitor for signs and symptoms of infection  - Monitor lab/diagnostic results  - Monitor all insertion sites, i e  indwelling lines, tubes, and drains  - [place] appropriate cooling/warming therapies per order  - Administer medications as ordered  - Instruct and encourage patient and family to use good hand hygiene technique  - Identify and instruct in appropriate isolation precautions for identified infection/condition   Outcome: Progressing     Problem: SAFETY ADULT  Goal: Patient will remain free of falls  Description  INTERVENTIONS:  - Assess patient frequently for physical needs  -  Identify cognitive and physical deficits and behaviors that affect risk of falls  -  [place] fall precautions as indicated by assessment   - Educate patient/family on patient safety including physical limitations  - Instruct patient to call for assistance with activity based on assessment  - Modify environment to reduce risk of injury  - Consider OT/PT consult to assist with strengthening/mobility  Outcome: Progressing     Problem: DISCHARGE PLANNING  Goal: Discharge to home or other facility with appropriate resources  Description  INTERVENTIONS:  - Identify barriers to discharge w/patient and caregiver  - Arrange for needed discharge resources and transportation as appropriate  - Identify discharge learning needs (meds, wound care, etc )  - Refer to Case Management Department for coordinating discharge planning if the patient needs post-hospital services based on physician/advanced practitioner order or complex needs related to functional status, cognitive ability, or social support system   Outcome: Progressing     Problem: Knowledge Deficit  Goal: Patient/family/caregiver demonstrates understanding of disease process, treatment plan, medications, and discharge instructions  Description  Complete learning assessment and assess knowledge base    Interventions:  - Provide teaching at level of understanding  - Provide teaching via preferred learning methods  Outcome: Progressing

## 2019-09-23 NOTE — PROGRESS NOTES
Progress Note - Neurology   Matt Granados 80 y o  female MRN: [id number]  Unit/Bed#: [facility] 727-01 Encounter: [id number]    Assessment:  80year old female with afib, anticoagulation on hold for 2 weeks due to hemoptysis, recent stroke with right sided hemiparesis  She is status post endovascular thrombectomy of the right pca on 9/19, she was found to have a right pca stroke, as well as embolic pattern stroke on MRI of brain  Plan:  -  Stroke protocol  -  Continue to monitor for infectious and metabolic derangements, being treated for aspiration PNA  -  Continue Heparin drip, adjusted was made this a m  by internal medicine- after discussion with neurology Dr Malathi Castellanos - APTT was 48 not at goal  -  Plan is for oral anticoagulation - after post op day 5, per Dr Neida Ellis (neurology notes), will review with attending need for repeat imaging prior   -  Therapies  -  Frequent neurological checks, notify neurology with any changes in examination, ct of head with any changes in examination    Subjective:   Attending notified by neurology - about heparin rate adjustment recommendations with APTT low this a m , Dr Malathi Castellanos from neurology reviewed with [facility] recommendations  The patient denies any new neurological complaints - she denies any new visual loss, headache, new one-sided weakness numbness or tingling, she reports only complaint she has is acid reflux and palpitations  Vitals: Blood pressure 126/67, pulse 85, temperature 98 4 °F (36 9 °C), resp  rate 16, weight 77 5 kg (170 lb 13 7 oz), SpO2 96 %  ,Body mass index is 28 43 kg/m²       Current Facility-Administered Medications:  acetaminophen 650 mg Oral Q6H PRN Mickey Garcia PA-C    albuterol 2 5 mg Nebulization Q6H PRN Jazmin Kruger PA-C    amiodarone 200 mg Oral BID With Meals JANAK Brown    amitriptyline 25 mg Oral HS Sagar Kruger PA-C    amLODIPine 2 5 mg Oral Daily Sagar Kruger PA-C    aspirin 81 mg Per NG Tube Daily Mickey Garcia PA-C atorvastatin 40 mg Oral QPM Lili Avery Kruger PA-C    bisacodyl 10 mg Rectal Daily PRN Nino Tomlinson PA-C    calcium carbonate 1,000 mg Oral BID PRN Yuly VIPUL Keller    cefTRIAXone 1,000 mg Intravenous Q24H Lili Sinha PA-C Last Rate: Stopped (09/22/19 1315)   heparin (porcine) 3-20 Units/kg/hr (Order-Specific) Intravenous Titrated Abner Nieves PA-C Last Rate: 12 Units/kg/hr (09/22/19 1127)   heparin (porcine)   Code/Trauma/Sedation Med Marya Munoz MD    metoprolol 5 mg Intravenous Q6H PRN Mckenzie E Held, VIPUL    metoprolol 5 mg Intravenous Once Mckenzie E Held, VIPUL    metoprolol tartrate 25 mg Oral TID Thuan Durand MD    metroNIDAZOLE 500 mg Oral [facility] Mukesh Tucker MD    nitroGLYcerin  Intra-arterial Code/Trauma/Sedation Med Marya Munoz MD    PARoxetine 10 mg Oral Daily Sagar Kruger PA-C    polyethylene glycol 17 g Oral Daily PRN Nino Tomlinson PA-C    senna-docusate sodium 1 tablet Oral HS Nino Tomlinson PA-C    verapamil  Intra-arterial Code/Trauma/Sedation Med Marya Munoz MD      Physical Exam:     Neurological    Mental status - the patient is awake alert, she is oriented to person and place, not exact year  She was able follow simple commands and some complex commands, there is no evidence of aphasia or dysarthria  Cranial nerves 2 through 12 are intact - there is a left visual field noted, mild decrease in nasolabial fold on the left  Motor - again 4-5 on the left arm, 5/5 in the right arm - 3 to 4/5 on the left leg, 2 to 3/5 on the right leg  Sensation - nonfocal to touch gorssly    Toes are downgoing bilaterally    No evidence of clonus or myoclonus or tremor  No evidence of seizure activity    Lab, Imaging and other studies:   I have personally reviewed pertinent reports    , CBC:   Results from last 7 days   Lab Units 09/23/19  0745 09/22/19  0742 09/21/19  0535   WBC Thousand/uL 16 33* 15 53* 13 59*   RBC Million/uL 4 96 4 84 4 25   HEMOGLOBIN g/dL 13 6 13 2 11 6   HEMATOCRIT % 42 7 40 7 36 2   MCV fL 86 84 85   PLATELETS Thousands/uL 472* 449* 395*   , BMP/CMP:   Results from last 7 days   Lab Units 09/23/19  0745 09/22/19  0742 09/21/19  2141  09/19/19  0812   SODIUM mmol/L 137 139 140   < >  --    POTASSIUM mmol/L 3 6 3 1* 3 7   < >  --    CHLORIDE mmol/L 106 107 110*   < >  --    CO2 mmol/L 25 24 23   < >  --    CO2, I-STAT mmol/L  --   --   --   --  19*   BUN mg/dL 13 9 16   < >  --    CREATININE mg/dL 0 76 0 61 0 76   < >  --    GLUCOSE, ISTAT mg/dl  --   --   --   --  136   CALCIUM mg/dL 8 8 8 3 8 4   < >  --    EGFR ml/min/1 73sq m 72 83 72   < >  --     < > = values in this interval not displayed  , Vitamin B12:   , HgBA1C:   Results from last 7 days   Lab Units 09/20/19  0610   HEMOGLOBIN A1C % 5 5   , TSH:   , Coagulation:   Results from last 7 days   Lab Units 09/20/19  1350   INR  1 16   , Lipid Profile:   Results from last 7 days   Lab Units 09/20/19  0610   HDL mg/dL 54   LDL CALC mg/dL 47   TRIGLYCERIDES mg/dL 70   , Ammonia:   , Urinalysis:       Invalid input(s): URIBILINOGEN, Drug Screen:   , Medication Drug Levels:       Invalid input(s): CARBAMAZEPINE,  PHENOBARB, LACOSAMIDE, OXCARBAZEPINE     Procedure: Mri Brain Wo Contrast    Result Date: 9/21/2019  Narrative: MRI BRAIN WITHOUT CONTRAST INDICATION: stroke posterior edovascular intervention  COMPARISON:   9/20/2019; 9/19/2019; post embolization head CT and CTA perfusion prior to embolization  TECHNIQUE:  Sagittal T1, axial T2, axial FLAIR, axial T1, axial [place] and axial diffusion imaging  IMAGE QUALITY:  Diagnostic  FINDINGS: BRAIN PARENCHYMA:  There is a moderate amount of diffusion restriction contiguously involving the right occipital lobe extending into the medial aspect of the right temporal lobe, correlating to the areas of elevated Tmax of greater than 6 seconds on prior CT perfusion, seen on images 12 through 16 of series 3    Separately there is a rounded focus of diffusion restriction in the right thalamus on image 17, series 3  Superiorly in the left frontal lobe towards the vertex in the anterior cerebral artery territory there are several discontiguous foci of diffusion restriction involving at least 2 discrete vascular territories  Scattered regions of diffusion restriction demonstrate associated FLAIR hyperintensity  Elsewhere there are scattered mild periventricular FLAIR hyperintensities  Chronic lacunar infarctions in the periventricular white matter superior to the body of the left lateral ventricle noted  No definite acute intracranial hemorrhage  No extra-axial fluid collection  Cerebellar tonsils normally positioned  VENTRICLES:  Age-appropriate ventricular prominence noted  SELLA AND PITUITARY GLAND:  Normal  ORBITS:  Bilateral lens implants identified  PARANASAL SINUSES:  Moderate sphenoidal sinus opacity noted  Mild bilateral maxillary sinus disease  VASCULATURE:  Evaluation of the major intracranial vasculature demonstrates appropriate flow voids  CALVARIUM AND SKULL BASE:  Normal  EXTRACRANIAL SOFT TISSUES:  Normal      Impression: 1  Acute infarctions involving the RIGHT occipital lobe and posterior medial right temporal lobes in the right posterior cerebral artery territory  Findings correlate to the perfusion abnormality demonstrating elevated Tmax >6 seconds 2  Acute right thalamic infarction  3   Discontiguous speckled foci of acute infarction in the parasagittal left frontal lobe in the LEFT anterior cerebral artery territory  Given the multiple vascular territories, a central embolic source should be excluded  4   Moderate, chronic microangiopathy   Workstation performed: [id number]

## 2019-09-24 ENCOUNTER — APPOINTMENT (INPATIENT)
Dept: RADIOLOGY | Facility: HOSPITAL | Age: 84
DRG: 023 | End: 2019-09-24
Payer: MEDICARE

## 2019-09-24 LAB
ANION GAP SERPL CALCULATED.3IONS-SCNC: 7 MMOL/L (ref 4–13)
APTT PPP: 52 SECONDS (ref 23–37)
APTT PPP: 56 SECONDS (ref 23–37)
ATRIAL RATE: 157 BPM
BUN SERPL-MCNC: 13 MG/DL (ref 5–25)
CALCIUM SERPL-MCNC: 8.8 MG/DL (ref 8.3–10.1)
CHLORIDE SERPL-SCNC: 105 MMOL/L (ref 100–108)
CO2 SERPL-SCNC: 26 MMOL/L (ref 21–32)
CREAT SERPL-MCNC: 0.71 MG/DL (ref 0.6–1.3)
ERYTHROCYTE [DISTWIDTH] IN BLOOD BY AUTOMATED COUNT: 14.6 % (ref 11.6–15.1)
GFR SERPL CREATININE-BSD FRML MDRD: 78 ML/MIN/1.73SQ M
GLUCOSE SERPL-MCNC: 91 MG/DL (ref 65–140)
HCT VFR BLD AUTO: 42.5 % (ref 34.8–46.1)
HGB BLD-MCNC: 13.5 G/DL (ref 11.5–15.4)
MCH RBC QN AUTO: 27.2 PG (ref 26.8–34.3)
MCHC RBC AUTO-ENTMCNC: 31.8 G/DL (ref 31.4–37.4)
MCV RBC AUTO: 86 FL (ref 82–98)
PLATELET # BLD AUTO: 514 THOUSANDS/UL (ref 149–390)
PMV BLD AUTO: 10.8 FL (ref 8.9–12.7)
POTASSIUM SERPL-SCNC: 3.6 MMOL/L (ref 3.5–5.3)
QRS AXIS: 35 DEGREES
QRSD INTERVAL: 75 MS
QT INTERVAL: 275 MS
QTC INTERVAL: 445 MS
RBC # BLD AUTO: 4.97 MILLION/UL (ref 3.81–5.12)
SODIUM SERPL-SCNC: 138 MMOL/L (ref 136–145)
T WAVE AXIS: 74 DEGREES
VENTRICULAR RATE: 157 BPM
WBC # BLD AUTO: 14.46 THOUSAND/UL (ref 4.31–10.16)

## 2019-09-24 PROCEDURE — 97530 THERAPEUTIC ACTIVITIES: CPT

## 2019-09-24 PROCEDURE — 85730 THROMBOPLASTIN TIME PARTIAL: CPT | Performed by: INTERNAL MEDICINE

## 2019-09-24 PROCEDURE — 93010 ELECTROCARDIOGRAM REPORT: CPT | Performed by: INTERNAL MEDICINE

## 2019-09-24 PROCEDURE — 85027 COMPLETE CBC AUTOMATED: CPT | Performed by: NURSE PRACTITIONER

## 2019-09-24 PROCEDURE — 99232 SBSQ HOSP IP/OBS MODERATE 35: CPT | Performed by: NURSE PRACTITIONER

## 2019-09-24 PROCEDURE — 99232 SBSQ HOSP IP/OBS MODERATE 35: CPT | Performed by: INTERNAL MEDICINE

## 2019-09-24 PROCEDURE — 92526 ORAL FUNCTION THERAPY: CPT

## 2019-09-24 PROCEDURE — 85730 THROMBOPLASTIN TIME PARTIAL: CPT | Performed by: NURSE PRACTITIONER

## 2019-09-24 PROCEDURE — 97535 SELF CARE MNGMENT TRAINING: CPT

## 2019-09-24 PROCEDURE — 97116 GAIT TRAINING THERAPY: CPT

## 2019-09-24 PROCEDURE — 70450 CT HEAD/BRAIN W/O DYE: CPT

## 2019-09-24 PROCEDURE — 80048 BASIC METABOLIC PNL TOTAL CA: CPT | Performed by: NURSE PRACTITIONER

## 2019-09-24 RX ORDER — ONDANSETRON 2 MG/ML
4 INJECTION INTRAMUSCULAR; INTRAVENOUS EVERY 6 HOURS PRN
Status: DISCONTINUED | OUTPATIENT
Start: 2019-09-24 | End: 2019-09-25 | Stop reason: HOSPADM

## 2019-09-24 RX ORDER — DABIGATRAN ETEXILATE 150 MG/1
150 CAPSULE, COATED PELLETS ORAL EVERY 12 HOURS SCHEDULED
Status: DISCONTINUED | OUTPATIENT
Start: 2019-09-24 | End: 2019-09-25 | Stop reason: HOSPADM

## 2019-09-24 RX ADMIN — METOPROLOL TARTRATE 25 MG: 25 TABLET, FILM COATED ORAL at 21:04

## 2019-09-24 RX ADMIN — POLYETHYLENE GLYCOL 3350 17 G: 17 POWDER, FOR SOLUTION ORAL at 08:42

## 2019-09-24 RX ADMIN — ATORVASTATIN CALCIUM 40 MG: 40 TABLET, FILM COATED ORAL at 17:44

## 2019-09-24 RX ADMIN — NYSTATIN 500000 UNITS: 100000 SUSPENSION ORAL at 13:41

## 2019-09-24 RX ADMIN — METOPROLOL TARTRATE 25 MG: 25 TABLET, FILM COATED ORAL at 16:22

## 2019-09-24 RX ADMIN — DABIGATRAN ETEXILATE MESYLATE 150 MG: 150 CAPSULE ORAL at 21:03

## 2019-09-24 RX ADMIN — AMLODIPINE BESYLATE 2.5 MG: 2.5 TABLET ORAL at 08:28

## 2019-09-24 RX ADMIN — METRONIDAZOLE 500 MG: 500 TABLET, FILM COATED ORAL at 05:25

## 2019-09-24 RX ADMIN — AMIODARONE HYDROCHLORIDE 200 MG: 200 TABLET ORAL at 16:22

## 2019-09-24 RX ADMIN — NYSTATIN 500000 UNITS: 100000 SUSPENSION ORAL at 17:44

## 2019-09-24 RX ADMIN — HEPARIN SODIUM AND DEXTROSE 13 UNITS/KG/HR: 10000; 5 INJECTION INTRAVENOUS at 13:41

## 2019-09-24 RX ADMIN — SENNOSIDES AND DOCUSATE SODIUM 1 TABLET: 8.6; 5 TABLET ORAL at 21:06

## 2019-09-24 RX ADMIN — BISACODYL 10 MG: 10 SUPPOSITORY RECTAL at 16:22

## 2019-09-24 RX ADMIN — AMIODARONE HYDROCHLORIDE 200 MG: 200 TABLET ORAL at 08:28

## 2019-09-24 RX ADMIN — NYSTATIN 500000 UNITS: 100000 SUSPENSION ORAL at 08:28

## 2019-09-24 RX ADMIN — AMITRIPTYLINE HYDROCHLORIDE 25 MG: 25 TABLET, FILM COATED ORAL at 21:03

## 2019-09-24 RX ADMIN — METOPROLOL TARTRATE 25 MG: 25 TABLET, FILM COATED ORAL at 08:29

## 2019-09-24 RX ADMIN — PAROXETINE 10 MG: 20 TABLET, FILM COATED ORAL at 08:28

## 2019-09-24 RX ADMIN — CALCIUM CARBONATE (ANTACID) CHEW TAB 500 MG 1000 MG: 500 CHEW TAB at 19:57

## 2019-09-24 RX ADMIN — NYSTATIN 500000 UNITS: 100000 SUSPENSION ORAL at 21:03

## 2019-09-24 NOTE — PHYSICAL THERAPY NOTE
Physical Therapy Progress Note     09/24/19 1120   Pain Assessment   Pain Assessment No/denies pain   Pain Score No Pain   Restrictions/Precautions   Weight Bearing Precautions Per Order No   Other Precautions Cognitive; Chair Alarm; Fall Risk;Multiple lines   General   Family/Caregiver Present Yes  ()   Cognition   Overall Cognitive Status Impaired   Arousal/Participation Alert; Responsive   Bed Mobility   Supine to Sit 3  Moderate assistance   Additional items Assist x 2;LE management;Verbal cues   Transfers   Sit to Stand 3  Moderate assistance   Additional items Assist x 2;Verbal cues   Stand to Sit 3  Moderate assistance   Additional items Assist x 2   Ambulation/Elevation   Gait pattern   (slow, short step length)   Gait Assistance 4  Minimal assist   Additional items Assist x 2;Verbal cues  (chair follow)   Assistive Device Rolling walker   Distance 15 feet and 10 feet   Balance   Static Sitting Poor +   Static Standing Poor +   Dynamic Standing Poor   Ambulatory Poor   Endurance Deficit   Endurance Deficit Yes   Endurance Deficit Description fatigue and general weakness   Activity Tolerance   Activity Tolerance Patient tolerated treatment well;Patient limited by fatigue   Nurse [facility] to see per VALERIA Pacheco   Exercises   Hip Flexion Sitting;15 reps;AAROM; Right  (AROM LLE)   Knee AROM Long Arc Quad Sitting;15 reps;AROM; Bilateral   Ankle Pumps Sitting;15 reps;AROM; Bilateral   Assessment   Prognosis Fair   Problem List Decreased strength;Decreased endurance; Impaired balance;Decreased mobility; Decreased coordination;Decreased cognition; Impaired judgement;Decreased safety awareness   Assessment Pt is making slow but steady progress with the use of a rolling walker   present today during session  Consistent verbal and tactile cues requried for hand placement with transfers  Min assist of two for walker management and safety during ambulation trials    Completed seated BLE exercises to conclude session  Chair alarm active post session  Pt would benefit from continued physical therapy to maximize functional independence  Goals   Patient Goals To get better   STG Expiration Date 10/04/19   Short Term Goal #1 In 10 sessions pt will: 1  Transfer supine <> sit with Carmencita  2  Transfer sit <> stand with Carmencita and LRAD  3  Sit EOB >20 minutes with supervision for static/dynamic balance  4  Ambulate >20 ft with modA and LRAD  5  Transfer to bedside chair with [address] and [address]  6  Perform LE exercise program   PT Treatment Day 2   Plan   Treatment/Interventions Functional transfer training;LE strengthening/ROM; Therapeutic exercise; Endurance training;Cognitive reorientation;Patient/family training;Bed mobility;Gait training;Spoke to nursing   Progress Progressing toward goals   PT Frequency   (3-6x/week)   Recommendation   Recommendation   (Rehab)   Equipment Recommended Millicent Ford; Wheelchair  (RW)     Mary Spann, PTA

## 2019-09-24 NOTE — RESTORATIVE TECHNICIAN NOTE
Restorative Specialist Mobility Note       Activity: Other (Comment)(Attempted to see pt for activity, pt is currently working with MARTA Guerin )      [employer] BS, Restorative Technician,

## 2019-09-24 NOTE — PLAN OF CARE
Problem: OCCUPATIONAL THERAPY ADULT  Goal: Performs self-care activities at highest level of function for planned discharge setting  See evaluation for individualized goals  Description  Treatment Interventions: ADL retraining, Functional transfer training, UE strengthening/ROM, Endurance training, Visual perceptual retraining, Cognitive reorientation, Patient/family training, Equipment evaluation/education, Fine motor coordination activities, Compensatory technique education, Neuromuscular reeducation, Continued evaluation, Energy conservation, Activityengagement          See flowsheet documentation for full assessment, interventions and recommendations  Outcome: Progressing  Note:   Limitation: Decreased ADL status, Decreased UE ROM, Decreased UE strength, Decreased Safe judgement during ADL, Decreased endurance, Decreased cognition, Visual deficit, Decreased fine motor control, Decreased self-care trans, Decreased high-level ADLs  Prognosis: Fair  Assessment: Pt was seen for a 30 minute OT session with focus on functional transfers and mobility, and self-care/ grooming  Pt cleared for treatment session by Ashok/Tara  Pt received HOB seated upright in bedside chair with no complaints of pain  Pt identifiers confirmed  Pt agreeable to treatment session  Pt presents with flat affect and requires verbal cues to initate tasks 2* hearing loss and cognitive impairment  Verbal cues and reinforcement for safety awareness while completing functional transfers 2* cognitive impairment and decreased activity tolerance  Assist required for completing grooming task while seated OOB in chair 2* decreased overall strength  Pt chair alarm active post session with all needs within reach  Pt would benefit from continued skilled OT services  Continue treatment PPC  OT Discharge Recommendation: Short Term Rehab  OT - OK to Discharge:  Yes

## 2019-09-24 NOTE — OCCUPATIONAL THERAPY NOTE
Occupational Therapy Note       09/24/19 1112   Restrictions/Precautions   Weight Bearing Precautions Per Order No   Other Precautions Cognitive; Chair Alarm; Bed Alarm;Multiple lines;Telemetry; Fall Risk   General   Family/Caregiver Present Yes-    Lifestyle   Autonomy Assist w/ ADLS, IADLS, Mod I w/ transfers and functional mobility    Reciprocal Relationships Pt lives w/ spouse; also has 2 dghts who are normally busy and not always to assist   Service to Others Pt does not work   Intrinsic Gratification Pt reports she enjoys doing housework   Pain Assessment   Pain Assessment No/denies pain   Pain Score No Pain   ADL   Where Assessed Chair   Grooming Assistance 3  Moderate Assistance   Grooming Deficit Setup;Verbal cueing; Increased time to complete;Brushing hair   Transfers   Sit to Stand 3  Moderate assistance   Additional items Assist x 2; Increased time required;Verbal cues; Impulsive   Stand to Sit 3  Moderate assistance   Additional items Assist x 2;Verbal cues; Increased time required   Stand pivot 3  Moderate assistance   Additional items Assist x 2; Increased time required;Verbal cues   Additional Comments Pt presents with flat affect and required verbal cues for safety awareness and for initate tasks 2* cognitive imoairemtns   Functional Mobility   Functional Mobility 3  Moderate assistance   Additional Comments Pt demonstrated decreased safety awareness 2* cognitive impairment   Additional items Rolling walker   Cognition   Overall Cognitive Status Impaired   Arousal/Participation Responsive; Cooperative   Attention Difficulty attending to directions   Orientation Level Oriented to person;Disoriented to time;Disoriented to situation;Oriented to place   Memory Unable to assess   Following Commands Follows one step commands with increased time or repetition   Activity Tolerance   Activity Tolerance Patient limited by fatigue   Assessment   Assessment Pt was seen for a 30 minute OT session with focus on functional transfers and mobility, and self-care/ grooming  Pt cleared for treatment session by Ashok/Tara  Pt received HOB seated upright in bedside chair with no complaints of pain  Pt identifiers confirmed  Pt agreeable to treatment session  Pt presents with flat affect and requires verbal cues to initate tasks 2* hearing loss and cognitive impairment  Verbal cues and reinforcement for safety awareness while completing functional transfers 2* cognitive impairment and decreased activity tolerance  Assist required for completing grooming task while seated OOB in chair 2* decreased overall strength  Pt chair alarm active post session with all needs within reach  Pt would benefit from continued skilled OT services  Continue treatment PPC  Plan   Treatment Interventions ADL retraining;Functional transfer training; Endurance training;UE strengthening/ROM; Cognitive reorientation;Patient/family training;Energy conservation; Activityengagement   Goal Expiration Date 10/04/19   OT Treatment Day 1   OT Frequency 3-5x/wk   Recommendation   OT Discharge Recommendation Short Term Rehab   OT - OK to Discharge Yes   [place] Index   Feeding 5   Bathing 0   Grooming Score 0   Dressing Score 5   Bladder Score 0   Bowels Score 10   Toilet Use Score 5   Transfers (Bed/Chair) Score 5   Mobility (Level Surface) Score 0   Stairs Score 0   [place] Index Score 30   Modified [place] Scale   Modified [place] Scale 4     Karli Josue ZIGGY Student

## 2019-09-24 NOTE — PROGRESS NOTES
General Cardiology   Progress Note -  Team One   Nehemiah Alonso 80 y o  female MRN: [id number]    Unit/Bed#: [facility] 727-01 Encounter: [id number]    Assessment/ Plan    1  Acute right PCA CVA s/p right P1 thrombectomy 9/19/19  In setting of inadequate anticoagulation for PAF  Had been without anticoagulation x 2 weeks  Can resume oral anticoagulation POD #5- which would be today  To confirm with neurlogy       2  Persistent atrial fibrillation  In and out of atrial fibrillation  Had been in NSR yesterday morning but quickly back in afib by end of the day  Tele reviewed- currently in atrial fibrillation, rates 60s-80s  Oral amiodarone, 200 mg BID, metoprolol tartrate 25 mg BID  Currently on heparin gtt for anticoagulation- modified PTT goal of 50-70  Pradaxa 150 mg BID starting tomorrow  Cost is acceptable to patient/spouse      3  HTN- stable, 24 hour average /72  On amlodipine 2 5 mg daily, metoprolol tartrate 25 mg BID       4  Aspiration pneumonia- management per primary team      Subjective  "I feel queasy " She denies chest pain, shortness of breath, or palpitations  She does not recall becoming lightheaded or dizzy when ambulating yesterday  Review of Systems   Constitution: Negative for diaphoresis and fever  Cardiovascular: Negative for chest pain, dyspnea on exertion, leg swelling, orthopnea and palpitations  Respiratory: Negative for cough and shortness of breath  Gastrointestinal: Positive for nausea  Negative for bloating and vomiting  Neurological: Positive for weakness  Negative for dizziness and light-headedness  All other systems reviewed and are negative  Objective:   Vitals: Blood pressure 126/59, pulse 92, temperature 98 2 °F (36 8 °C), resp  rate 18, weight 79 2 kg (174 lb 9 7 oz), SpO2 94 %  ,     Body mass index is 29 06 kg/m²  ,     Systolic (07AQH), SYR:230 , Min:126 , GQK:677     Diastolic (38MPX), WSE:74, Min:59, Max:87      Intake/Output Summary (Last 24 hours) at 9/24/2019 0153  Last data filed at 9/23/2019 2300  Gross per 24 hour   Intake --   Output 485 ml   Net -485 ml     Weight (last 2 days)     Date/Time   Weight    09/24/19 0559   79 2 (174 6)    09/23/19 0600   77 5 (170 86)            Telemetry Review: No significant arrhythmias seen on telemetry review  In and out of atrial fibrillation, currently afib with rates 60-80s  Physical Exam   Constitutional: She is oriented to person, place, and time  No distress  Neck: Neck supple  No JVD present  Cardiovascular: Normal rate and intact distal pulses  An irregularly irregular rhythm present  Exam reveals no gallop and no friction rub  No murmur heard  Pulmonary/Chest: Effort normal  No respiratory distress  She has rhonchi  Rhonchi appreciated anteriorly, on room air  Musculoskeletal: She exhibits no edema  Neurological: She is alert and oriented to person, place, and time  Poor historian  Skin: Skin is warm and dry       LABORATORY RESULTS      CBC with diff: Results from last 7 days   Lab Units 09/24/19 0558 09/23/19  0745 09/22/19  0742 09/21/19  0535 09/20/19  0610 09/19/19  1137 09/19/19  0812   WBC Thousand/uL 14 46* 16 33* 15 53* 13 59* 14 19* 13 42*  --    HEMOGLOBIN g/dL 13 5 13 6 13 2 11 6 11 0* 11 4*  --    I STAT HEMOGLOBIN g/dl  --   --   --   --   --   --  10 9*   HEMATOCRIT % 42 5 42 7 40 7 36 2 34 9 36 0  --    HEMATOCRIT, ISTAT %  --   --   --   --   --   --  32*   MCV fL 86 86 84 85 86 87  --    PLATELETS Thousands/uL 514* 472* 449* 395* 372 340  --    MCH pg 27 2 27 4 27 3 27 3 27 2 27 4  --    MCHC g/dL 31 8 31 9 32 4 32 0 31 5 31 7  --    RDW % 14 6 14 1 14 4 14 5 14 5 14 5  --    MPV fL 10 8 9 9 9 8 10 3 10 4 10 6  --    NRBC AUTO /100 WBCs  --  0 0 0 0 0  --      CMP:  Results from last 7 days   Lab Units 09/24/19 0558 09/23/19  0745 09/22/19  0742 09/21/19  2141 09/21/19  0535 09/20/19  0610 09/20/19  0022 09/19/19  1137  09/19/19  0812   POTASSIUM mmol/L 3 6 3 6 3 1* 3 7 3  4* 4 7 3 2* 3 4*   < >  --    CHLORIDE mmol/L 105 106 107 110* 111* 116*  --  114*  --   --    CO2 mmol/L 26 25 24 23 22 21  --  19*  --   --    CO2, I-STAT mmol/L  --   --   --   --   --   --   --   --   --  19*   BUN mg/dL 13 13 9 16 14 12  --  17  --   --    CREATININE mg/dL 0 71 0 76 0 61 0 76 0 68 0 60  --  0 61  --   --    GLUCOSE, ISTAT mg/dl  --   --   --   --   --   --   --   --   --  136   CALCIUM mg/dL 8 8 8 8 8 3 8 4 8 1* 8 0*  --  7 1*  --   --    EGFR ml/min/1 73sq m 78 72 83 72 80 83  --  83  --   --     < > = values in this interval not displayed  BMP:  Results from last 7 days   Lab Units 09/24/19  0558 09/23/19  0745 09/22/19  7025 09/21/19  2141 09/21/19  0535 09/20/19  0610 09/20/19  0022 09/19/19  1137  09/19/19  0812   POTASSIUM mmol/L 3 6 3 6 3 1* 3 7 3 4* 4 7 3 2* 3 4*   < >  --    CHLORIDE mmol/L 105 106 107 110* 111* 116*  --  114*  --   --    CO2 mmol/L 26 25 24 23 22 21  --  19*  --   --    CO2, I-STAT mmol/L  --   --   --   --   --   --   --   --   --  19*   BUN mg/dL 13 13 9 16 14 12  --  17  --   --    CREATININE mg/dL 0 71 0 76 0 61 0 76 0 68 0 60  --  0 61  --   --    GLUCOSE, ISTAT mg/dl  --   --   --   --   --   --   --   --   --  136   CALCIUM mg/dL 8 8 8 8 8 3 8 4 8 1* 8 0*  --  7 1*  --   --     < > = values in this interval not displayed       Results from last 7 days   Lab Units 09/23/19  0745 09/21/19  0535 09/20/19  0610 09/20/19  0022 09/19/19  1137   MAGNESIUM mg/dL 2 0 2 0 2 4 1 8 1 8      Results from last 7 days   Lab Units 09/20/19  0610   HEMOGLOBIN A1C % 5 5     Results from last 7 days   Lab Units 09/20/19  1350   INR  1 16     Lipid Profile:   No results found for: CHOL  Lab Results   Component Value Date    HDL 54 09/20/2019     Lab Results   Component Value Date    LDLCALC 47 09/20/2019     Lab Results   Component Value Date    TRIG 70 09/20/2019     Cardiac testing:   Results for orders placed during the hospital encounter of 09/19/19   Echo complete with contrast if indicated    Narrative [address]  [facility], [address] [facility]  [phone]    Transthoracic Echocardiogram  2D, M-mode, Doppler, and Color Doppler    Study date:  19-Sep-2019    Patient: Aneudy Brewer  MR number: [id number]  Account number: [de-identified]  : 1934  Age: 80 years  Gender: Female  Status: Inpatient  Location: Bedside  Height: 65 in  Weight: 169 lb  BP: 145/ 43 mmHg    Indications: CVA  Diagnoses: I67 9 - Cerebrovascular disease, unspecified    Sonographer:  Emmanuel Max RDCS  Primary Physician:  Adelaida Holguin MD  Referring Physician:  Johanny Bernstein PA-C  Group:  [id number]  Cardiology Fellow:  Tristin Suero MD  Interpreting Physician:  Spencer Crystal MD    SUMMARY    LEFT VENTRICLE:  Systolic function was normal  Ejection fraction was estimated to be 65 %  Doppler parameters were consistent with abnormal left ventricular relaxation (grade 1 diastolic dysfunction)  RIGHT VENTRICLE:  The size was normal   Systolic function was normal     LEFT ATRIUM:  The atrium was mildly dilated  AORTIC VALVE:  There was mild regurgitation  TRICUSPID VALVE:  There was trace regurgitation  Pulmonary artery systolic pressure was within the normal range  HISTORY: PRIOR HISTORY: Asthma  Afib  GERD  Hyperlipidemia  PROCEDURE: The procedure was performed at the bedside  This was a routine study  The transthoracic approach was used  The study included complete 2D imaging, M-mode, complete spectral Doppler, and color Doppler  The heart rate was 75 bpm,  at the start of the study  Images were obtained from the parasternal, apical, subcostal, and suprasternal notch acoustic windows  Image quality was adequate  LEFT VENTRICLE: Size was normal  Systolic function was normal  Ejection fraction was estimated to be 65 %   Although no diagnostic regional wall motion abnormality was identified, this possibility cannot be completely excluded on the basis  of this study  Wall thickness was normal  There was no evidence of concentric hypertrophy  DOPPLER: Doppler parameters were consistent with abnormal left ventricular relaxation (grade 1 diastolic dysfunction)  RIGHT VENTRICLE: The size was normal  Systolic function was normal     LEFT ATRIUM: The atrium was mildly dilated  RIGHT ATRIUM: Size was normal     MITRAL VALVE: There was mild annular calcification  There was normal leaflet separation  DOPPLER: The transmitral velocity was within the normal range  There was no evidence for stenosis  There was trace regurgitation  AORTIC VALVE: The valve was trileaflet  Leaflets exhibited mild calcification, normal cuspal separation, and sclerosis  DOPPLER: Transaortic velocity was within the normal range  There was no evidence for stenosis  There was mild  regurgitation  TRICUSPID VALVE: The valve structure was normal  There was normal leaflet separation  DOPPLER: The transtricuspid velocity was within the normal range  There was no evidence for stenosis  There was trace regurgitation  Pulmonary artery  systolic pressure was within the normal range  Estimated peak PA pressure was 33 mmHg  PULMONIC VALVE: Leaflets exhibited normal thickness, no calcification, and normal cuspal separation  DOPPLER: The transpulmonic velocity was within the normal range  There was no evidence for stenosis  There was trace regurgitation  PERICARDIUM: There was no pericardial effusion  The pericardium was normal in appearance  AORTA: The root exhibited normal size  SYSTEMIC VEINS: IVC: The inferior vena cava was normal in size and course  Respirophasic changes were normal     PULMONARY VEINS: DOPPLER: Doppler flow pattern was normal in the pulmonary vein(s)      SYSTEM MEASUREMENT TABLES    2D mode  AV Diam: 2 9 cm  AoR Diam; Mean (2D): 2 9 cm  LA Diam (2D): 4 cm  LA Dimension; Mean (2D): 4 cm  LA/Ao (2D): 1 38  EDV (2D-Cubed): 64 cm3  EF (2D-Cubed): 69 2 %  ESV (2D-Cubed): 19 7 cm3  FS (2D-Cubed): 32 5 %  FS (2D-Teich): 32 5 %  IVS/LVPW (2D): 1 1  IVSd (2D): 1 1 cm  IVSd; Mean chosen (2D): 1 1 cm  LVIDd (2D): 4 cm  LVIDd; Mean (2D): 4 cm  LVIDs (2D): 2 7 cm  LVIDs; Mean (2D): 2 7 cm  LVPWd (2D): 1 cm  LVPWd; Mean (2D): 1 cm  Left Ventricular Ejection Fraction; Teichholz; 2D mode;: 61 4 %  Left Ventricular End Diastolic Volume; Teichholz; 2D mode;: 70 cm3  Left Ventricular End Systolic Volume; Teichholz; 2D mode;: 27 cm3  SV (2D-Cubed): 44 3 cm3  Stroke Volume; Teichholz; 2D mode;: 43 cm3  RVIDd (2D): 3 1 cm  RVIDd; Mean (2D): 3 1 cm  Right and Left Ventricular End Diastolic Diameter Ratio; 2D mode;: 0 78    Apical four chamber  LV MOD Diam; Recent value; End Diastole (A4C): 4 96 cm  LV MOD Diam; Recent value; End Diastole (A4C): 4 57 cm  LV MOD Diam; Recent value; End Diastole (A4C): 4 57 cm  LV MOD Diam; Recent value; End Diastole (A4C): 4 59 cm  LV MOD Diam; Recent value; End Diastole (A4C): 4 67 cm  LV MOD Diam; Recent value; End Diastole (A4C): 4 65 cm  LV MOD Diam; Recent value; End Diastole (A4C): 4 49 cm  LV MOD Diam; Recent value; End Diastole (A4C): 4 23 cm  LV MOD Diam; Recent value; End Diastole (A4C): 3 94 cm  LV MOD Diam; Recent value; End Diastole (A4C): 3 58 cm  LV MOD Diam; Recent value; End Diastole (A4C): 3 26 cm  LV MOD Diam; Recent value; End Diastole (A4C): 2 82 cm  LV MOD Diam; Recent value; End Diastole (A4C): 2 33 cm  LV MOD Diam; Recent value; End Diastole (A4C): 1 67 cm  LV MOD Diam; Recent value; End Diastole (A4C): 4 88 cm  LV MOD Diam; Recent value; End Diastole (A4C): 4 7 cm  LV MOD Diam; Recent value; End Diastole (A4C): 4 25 cm  LV MOD Diam; Recent value; End Diastole (A4C): 2 19 cm  LV MOD Diam; Recent value; End Diastole (A4C): 4 96 cm  LV MOD Diam; Recent value; End Diastole (A4C): 4 73 cm  LV MOD Diam; Recent value; End Systole (A4C): 2 76 cm  LV MOD Diam; Recent value;  End Systole (A4C): 3 48 cm  LV MOD Diam; Recent value; End Systole (A4C): 3 63 cm  LV MOD Diam; Recent value; End Systole (A4C): 3 61 cm  LV MOD Diam; Recent value; End Systole (A4C): 3 43 cm  LV MOD Diam; Recent value; End Systole (A4C): 3 17 cm  LV MOD Diam; Recent value; End Systole (A4C): 2 84 cm  LV MOD Diam; Recent value; End Systole (A4C): 2 66 cm  LV MOD Diam; Recent value; End Systole (A4C): 2 56 cm  LV MOD Diam; Recent value; End Systole (A4C): 2 51 cm  LV MOD Diam; Recent value; End Systole (A4C): 2 48 cm  LV MOD Diam; Recent value; End Systole (A4C): 2 46 cm  LV MOD Diam; Recent value; End Systole (A4C): 2 41 cm  LV MOD Diam; Recent value; End Systole (A4C): 2 28 cm  LV MOD Diam; Recent value; End Systole (A4C): 2 12 cm  LV MOD Diam; Recent value; End Systole (A4C): 1 94 cm  LV MOD Diam; Recent value; End Systole (A4C): 1 74 cm  LV MOD Diam; Recent value; End Systole (A4C): 1 48 cm  LV MOD Diam; Recent value; End Systole (A4C): 1 15 cm  LV MOD Diam; Recent value; End Systole (A4C): 0 82 cm  LVEF MOD A4C: 65 4 %  Left Ventricle diastolic major axis; Most recent value chosen; Method of Disks, Single Plane; 2D mode; Apical four chamber;: 7 44 cm  Left Ventricle systolic major axis; Most recent value chosen; Method of Disks, Single Plane; 2D mode; Apical four chamber;: 6 52 cm  Left Ventricular Diastolic Area; Most recent value chosen; Method of Disks, Single Plane; 2D mode; Apical four chamber;: 2980 mm2  Left Ventricular End Diastolic Volume; Most recent value chosen; Method of Disks, Single Plane; 2D mode; Apical four chamber;: 99 2 cm3  Left Ventricular End Systolic Volume; Most recent value chosen; Method of Disks, Single Plane; 2D mode; Apical four chamber;: 34 4 cm3  Left Ventricular Systolic Area; Most recent value chosen; Method of Disks, Single Plane; 2D mode;  Apical four chamber;: 1580 mm2  SV MOD A4C: 64 9 cm3    M mode  Inferior Vena Cava Diameter; During Expiration; M mode;: 2 93 cm  Inferior Vena Cava Diameter; Mean; Mean value chosen; During Expiration; M mode;: 2 93 cm  Tricuspid Annular Plane Systolic Excursion; Mean; Mean value chosen; Tricuspid Annulus; M mode;: 1 87 cm  Tricuspid Annular Plane Systolic Excursion; Tricuspid Annulus; M mode;: 1 87 cm    Tissue Doppler Imaging  LV Peak Early Luciano Tissue Irvin; Medial MA (TDI): 49 mm/s  Left Ventricular Peak Early Diastolic Tissue Velocity; Mean; Mean value chosen; Medial Mitral Annulus; Tissue Doppler Imaging;: 49 mm/s    Unspecified Scan Mode  Dec [place]brier; Mean; Regurgitant Flow: 3680 mm/s2  Dec [place]; Regurgitant Flow: 3680 mm/s2  PHT; Mean; Regurgitant Flow: 334 ms  PHT; Regurgitant Flow: 334 ms  Peak Grad; Mean; Regurgitant Flow: 71 mm[Hg]  Vmax; Mean; Regurgitant Flow: 4200 mm/s  Vmax; Regurgitant Flow: 4200 mm/s  MV Peak Irvin/LV Peak Tissue Irvin E-Wave; Medial MA: 22 7  DT; Antegrade Flow: 197 ms  DT; Mean; Antegrade Flow: 197 ms  Dec [place]brier; Antegrade Flow: 5610 mm/s2  Dec [place]; Mean; Antegrade Flow: 5610 mm/s2  MV A Irvin: 1140 mm/s  MV E Irvin: 1110 mm/s  MV E/A Ratio: 1  MV Peak A Irvin: 1140 mm/s  MV Peak E Irvin; Mean; Antegrade Flow: 1110 mm/s  MVA (PHT): 379 mm2  PHT: 58 ms  PHT;  Mean: 58 ms  Peak Grad; Mean; Regurgitant Flow: 27 mm[Hg]  Vmax; Mean; Regurgitant Flow: 2580 mm/s  Vmax; Regurgitant Flow: 2580 mm/s    [employer] Accredited Echocardiography Laboratory    Prepared and electronically signed by    Xiomara Conrad MD  Signed 19-Sep-2019 16:59:12       Meds/Allergies   all current active meds have been reviewed and current meds:   Current Facility-Administered Medications   Medication Dose Route Frequency    acetaminophen (TYLENOL) tablet 650 mg  650 mg Oral Q6H PRN    albuterol inhalation solution 2 5 mg  2 5 mg Nebulization Q6H PRN    amiodarone tablet 200 mg  200 mg Oral BID With Meals    amitriptyline (ELAVIL) tablet 25 mg  25 mg Oral HS    amLODIPine (NORVASC) tablet 2 5 mg  2 5 mg Oral Daily    atorvastatin (LIPITOR) tablet 40 mg  40 mg Oral QPM    bisacodyl (DULCOLAX) rectal suppository 10 mg  10 mg Rectal Daily PRN    calcium carbonate (TUMS) chewable tablet 1,000 mg  1,000 mg Oral BID PRN    cefTRIAXone (ROCEPHIN) 1,000 mg in dextrose 5 % 50 mL IVPB  1,000 mg Intravenous Q24H    heparin (porcine) 25,000 units in 250 mL infusion (premix)  3-20 Units/kg/hr (Order-Specific) Intravenous Titrated    heparin (porcine) injection    Code/Trauma/Sedation Med    metoprolol (LOPRESSOR) injection 5 mg  5 mg Intravenous Q6H PRN    metoprolol (LOPRESSOR) injection 5 mg  5 mg Intravenous Once    metoprolol tartrate (LOPRESSOR) tablet 25 mg  25 mg Oral TID    metroNIDAZOLE (FLAGYL) tablet 500 mg  500 mg Oral Q8H [address]    nitroGLYcerin (TRIDIL) 50 mg in 250 mL infusion (premix)   Intra-arterial Code/Trauma/Sedation Med    nystatin (MYCOSTATIN) oral suspension 500,000 Units  500,000 Units Swish & Swallow 4x Daily    PARoxetine (PAXIL) tablet 10 mg  10 mg Oral Daily    polyethylene glycol (MIRALAX) packet 17 g  17 g Oral Daily PRN    senna-docusate sodium (SENOKOT S) 8 6-50 mg per tablet 1 tablet  1 tablet Oral HS    verapamil (ISOPTIN) injection   Intra-arterial Code/Trauma/Sedation Med     Medications Prior to Admission   Medication    acetaminophen-codeine (TYLENOL #3) 300-30 mg per tablet    aspirin 325 mg tablet    atorvastatin (LIPITOR) 40 mg tablet    cephalexin (KEFLEX) 500 mg capsule    clopidogrel (PLAVIX) 75 mg tablet    levofloxacin (LEVAQUIN) 750 mg tablet    metoprolol succinate (TOPROL-XL) 25 mg 24 hr tablet    PARoxetine (PAXIL) 10 mg tablet    amitriptyline (ELAVIL) 25 mg tablet    amLODIPine (NORVASC) 2 5 mg tablet    ibuprofen (ADVIL) 200 mg tablet    simvastatin (ZOCOR) 10 mg tablet       heparin (porcine) 3-20 Units/kg/hr (Order-Specific) Last Rate: 13 Units/kg/hr (09/23/19 1919)     Counseling / Coordination of Care  Total floor / unit time spent today 20 minutes    Greater than 50% of total time was spent with the patient and / or family counseling and / or coordination of care  ** Please Note: Dragon Infolinks Dictation voice to text software may have been used in the creation of this document   **

## 2019-09-24 NOTE — PLAN OF CARE
Problem: Prexisting or High Potential for Compromised Skin Integrity  Goal: Skin integrity is maintained or improved  Description  INTERVENTIONS:  - Identify patients at risk for skin breakdown  - Assess and monitor skin integrity  - Assess and monitor nutrition and hydration status  - Monitor labs   - Assess for incontinence   - Turn and reposition patient  - Assist with mobility/ambulation  - Relieve pressure over bony prominences  - Avoid friction and shearing  - Provide appropriate hygiene as needed including keeping skin clean and dry  - Evaluate need for skin moisturizer/barrier cream  - Collaborate with interdisciplinary team   - Patient/family teaching  - Consider wound care consult   Outcome: Progressing     Problem: Neurological Deficit  Goal: Neurological status is stable or improving  Description  Interventions:  - Monitor and assess patient's level of consciousness, motor function, sensory function, and level of assistance needed for ADLs  - Monitor and report changes from baseline  Collaborate with interdisciplinary team to initiate plan and implement interventions as ordered  - Provide and maintain a safe environment  - Consider seizure precautions  - Consider fall precautions  - Consider aspiration precautions  - Consider bleeding precautions  Outcome: Progressing     Problem: Activity Intolerance/Impaired Mobility  Goal: Mobility/activity is maintained at optimum level for patient  Description  Interventions:  - Assess and monitor patient  barriers to mobility and need for assistive/adaptive devices  - Assess patient's emotional response to limitations  - Collaborate with interdisciplinary team and initiate plans and interventions as ordered  - Encourage independent activity per ability   - Maintain proper body alignment  - Perform active/passive rom as tolerated/ordered    - Plan activities to conserve energy   - Turn patient as appropriate  Outcome: Progressing     Problem: Nutrition  Goal: Nutrition/Hydration status is improving  Description  Monitor and assess patient's nutrition/hydration status for malnutrition (ex- brittle hair, bruises, dry skin, pale skin and conjunctiva, muscle wasting, smooth red tongue, and disorientation)  Collaborate with interdisciplinary team and initiate plan and interventions as ordered  Monitor patient's weight and dietary intake as ordered or per policy  Utilize nutrition screening tool and intervene per policy  Determine patient's food preferences and provide high-protein, high-caloric foods as appropriate  - Assist patient with eating   - Allow adequate time for meals   - Encourage patient to take dietary supplement as ordered  - Collaborate with clinical nutritionist   - Include patient/family/caregiver in decisions related to nutrition  Outcome: Progressing     Problem: CONFUSION/THOUGHT DISTURBANCE  Goal: Thought disturbances (confusion, delirium, depression, dementia or psychosis) are managed to maintain or return to baseline mental status and functional level  Description  INTERVENTIONS:  - Assess for possible contributors to  thought disturbance, including but not limited to medications, infection, impaired vision or hearing, underlying metabolic abnormalities, dehydration, respiratory compromise,  psychiatric diagnoses and notify attending PHYSICAN/AP  - Monitor and intervene to maintain adequate nutrition, hydration, elimination, sleep and activity  - Decrease environmental stimuli, including noise as appropriate  - Provide frequent contacts to provide refocusing, direction and reassurance as needed  Approach patient calmly with eye contact and at their level    - [place] high risk fall precautions, aspiration precautions and other safety measures, as indicated  - If delirium suspected, notify physician/AP of change in condition and request immediate in-person evaluation  - Pursue consults as appropriate including Geriatric (campus dependent), OT for cognitive evaluation/activity planning, psychiatric, pastoral care, etc   Outcome: Progressing     Problem: Potential for Falls  Goal: Patient will remain free of falls  Description  INTERVENTIONS:  - Assess patient frequently for physical needs  -  Identify cognitive and physical deficits and behaviors that affect risk of falls    -  [place] fall precautions as indicated by assessment   - Educate patient/family on patient safety including physical limitations  - Instruct patient to call for assistance with activity based on assessment  - Modify environment to reduce risk of injury  - Consider OT/PT consult to assist with strengthening/mobility  Outcome: Progressing     Problem: PAIN - ADULT  Goal: Verbalizes/displays adequate comfort level or baseline comfort level  Description  Interventions:  - Encourage patient to monitor pain and request assistance  - Assess pain using appropriate pain scale  - Administer analgesics based on type and severity of pain and evaluate response  - Implement non-pharmacological measures as appropriate and evaluate response  - Notify physician/advanced practitioner if interventions unsuccessful or patient reports new pain   Outcome: Progressing     Problem: INFECTION - ADULT  Goal: Absence or prevention of progression during hospitalization  Description  INTERVENTIONS:  - Assess and monitor for signs and symptoms of infection  - Monitor lab/diagnostic results  - Monitor all insertion sites, i e  indwelling lines, tubes, and drains  - [place] appropriate cooling/warming therapies per order  - Administer medications as ordered  - Instruct and encourage patient and family to use good hand hygiene technique  - Identify and instruct in appropriate isolation precautions for identified infection/condition   Outcome: Progressing     Problem: SAFETY ADULT  Goal: Patient will remain free of falls  Description  INTERVENTIONS:  - Assess patient frequently for physical needs  -  Identify cognitive and physical deficits and behaviors that affect risk of falls  -  [place] fall precautions as indicated by assessment   - Educate patient/family on patient safety including physical limitations  - Instruct patient to call for assistance with activity based on assessment  - Modify environment to reduce risk of injury  - Consider OT/PT consult to assist with strengthening/mobility  Outcome: Progressing     Problem: DISCHARGE PLANNING  Goal: Discharge to home or other facility with appropriate resources  Description  INTERVENTIONS:  - Identify barriers to discharge w/patient and caregiver  - Arrange for needed discharge resources and transportation as appropriate  - Identify discharge learning needs (meds, wound care, etc )  - Refer to Case Management Department for coordinating discharge planning if the patient needs post-hospital services based on physician/advanced practitioner order or complex needs related to functional status, cognitive ability, or social support system   Outcome: Progressing     Problem: Knowledge Deficit  Goal: Patient/family/caregiver demonstrates understanding of disease process, treatment plan, medications, and discharge instructions  Description  Complete learning assessment and assess knowledge base    Interventions:  - Provide teaching at level of understanding  - Provide teaching via preferred learning methods  Outcome: Progressing

## 2019-09-24 NOTE — ASSESSMENT & PLAN NOTE
· S/p R P1 thrombectomy 9/19/19 with Dr Tamra Anton  · Neurosurgery note from 9/20 reviewed  They have cleared for dvt ppx, anti-platelet and anticoagulation--they did not agree with radiology reading of petechial hemorrhage  They have signed off, f/u PRN as outpatient   · MRI Brain 9/21: Acute infarctions involving the RIGHT occipital lobe and posterior medial right temporal lobes in the right posterior cerebral artery territory  Acute right thalamic infarction  Discontiguous speckled foci of acute infarction in the parasagittal left frontal lobe in the LEFT anterior cerebral artery territory  Given the multiple vascular territories, a central embolic source should be excluded  · Appreciate neurology input: recommend DOAC, pradaxa to start today if repeat CT head (-)   · PT/OT input appreciated  Will require STR at discharge   CM working on placement

## 2019-09-24 NOTE — PLAN OF CARE
Problem: PHYSICAL THERAPY ADULT  Goal: Performs mobility at highest level of function for planned discharge setting  See evaluation for individualized goals  Description  Treatment/Interventions: Functional transfer training, LE strengthening/ROM, Therapeutic exercise, Endurance training, Bed mobility, Gait training, Spoke to nursing, Spoke to case management, Spoke to advanced practitioner, OT, Family          See flowsheet documentation for full assessment, interventions and recommendations  Outcome: Progressing  Note:   Prognosis: Fair  Problem List: Decreased strength, Decreased endurance, Impaired balance, Decreased mobility, Decreased coordination, Decreased cognition, Impaired judgement, Decreased safety awareness  Assessment: Pt is making slow but steady progress with the use of a rolling walker   present today during session  Consistent verbal and tactile cues requried for hand placement with transfers  Min assist of two for walker management and safety during ambulation trials  Completed seated BLE exercises to conclude session  Chair alarm active post session  Pt would benefit from continued physical therapy to maximize functional independence  Barriers to Discharge: Decreased caregiver support     Recommendation: (Rehab)     PT - OK to Discharge: Yes(to rehab when medically ready)    See flowsheet documentation for full assessment

## 2019-09-24 NOTE — PLAN OF CARE
Problem: Prexisting or High Potential for Compromised Skin Integrity  Goal: Skin integrity is maintained or improved  Description  INTERVENTIONS:  - Identify patients at risk for skin breakdown  - Assess and monitor skin integrity  - Assess and monitor nutrition and hydration status  - Monitor labs   - Assess for incontinence   - Turn and reposition patient  - Assist with mobility/ambulation  - Relieve pressure over bony prominences  - Avoid friction and shearing  - Provide appropriate hygiene as needed including keeping skin clean and dry  - Evaluate need for skin moisturizer/barrier cream  - Collaborate with interdisciplinary team   - Patient/family teaching  - Consider wound care consult   Outcome: Progressing     Problem: Neurological Deficit  Goal: Neurological status is stable or improving  Description  Interventions:  - Monitor and assess patient's level of consciousness, motor function, sensory function, and level of assistance needed for ADLs  - Monitor and report changes from baseline  Collaborate with interdisciplinary team to initiate plan and implement interventions as ordered  - Provide and maintain a safe environment  - Consider seizure precautions  - Consider fall precautions  - Consider aspiration precautions  - Consider bleeding precautions  Outcome: Progressing     Problem: Activity Intolerance/Impaired Mobility  Goal: Mobility/activity is maintained at optimum level for patient  Description  Interventions:  - Assess and monitor patient  barriers to mobility and need for assistive/adaptive devices  - Assess patient's emotional response to limitations  - Collaborate with interdisciplinary team and initiate plans and interventions as ordered  - Encourage independent activity per ability   - Maintain proper body alignment  - Perform active/passive rom as tolerated/ordered    - Plan activities to conserve energy   - Turn patient as appropriate  Outcome: Progressing     Problem: Nutrition  Goal: Nutrition/Hydration status is improving  Description  Monitor and assess patient's nutrition/hydration status for malnutrition (ex- brittle hair, bruises, dry skin, pale skin and conjunctiva, muscle wasting, smooth red tongue, and disorientation)  Collaborate with interdisciplinary team and initiate plan and interventions as ordered  Monitor patient's weight and dietary intake as ordered or per policy  Utilize nutrition screening tool and intervene per policy  Determine patient's food preferences and provide high-protein, high-caloric foods as appropriate  - Assist patient with eating   - Allow adequate time for meals   - Encourage patient to take dietary supplement as ordered  - Collaborate with clinical nutritionist   - Include patient/family/caregiver in decisions related to nutrition  Outcome: Progressing     Problem: CONFUSION/THOUGHT DISTURBANCE  Goal: Thought disturbances (confusion, delirium, depression, dementia or psychosis) are managed to maintain or return to baseline mental status and functional level  Description  INTERVENTIONS:  - Assess for possible contributors to  thought disturbance, including but not limited to medications, infection, impaired vision or hearing, underlying metabolic abnormalities, dehydration, respiratory compromise,  psychiatric diagnoses and notify attending PHYSICAN/AP  - Monitor and intervene to maintain adequate nutrition, hydration, elimination, sleep and activity  - Decrease environmental stimuli, including noise as appropriate  - Provide frequent contacts to provide refocusing, direction and reassurance as needed  Approach patient calmly with eye contact and at their level    - [place] high risk fall precautions, aspiration precautions and other safety measures, as indicated  - If delirium suspected, notify physician/AP of change in condition and request immediate in-person evaluation  - Pursue consults as appropriate including Geriatric (campus dependent), OT for cognitive evaluation/activity planning, psychiatric, pastoral care, etc   Outcome: Progressing     Problem: Potential for Falls  Goal: Patient will remain free of falls  Description  INTERVENTIONS:  - Assess patient frequently for physical needs  -  Identify cognitive and physical deficits and behaviors that affect risk of falls    -  [place] fall precautions as indicated by assessment   - Educate patient/family on patient safety including physical limitations  - Instruct patient to call for assistance with activity based on assessment  - Modify environment to reduce risk of injury  - Consider OT/PT consult to assist with strengthening/mobility  Outcome: Progressing     Problem: PAIN - ADULT  Goal: Verbalizes/displays adequate comfort level or baseline comfort level  Description  Interventions:  - Encourage patient to monitor pain and request assistance  - Assess pain using appropriate pain scale  - Administer analgesics based on type and severity of pain and evaluate response  - Implement non-pharmacological measures as appropriate and evaluate response  - Notify physician/advanced practitioner if interventions unsuccessful or patient reports new pain   Outcome: Progressing     Problem: INFECTION - ADULT  Goal: Absence or prevention of progression during hospitalization  Description  INTERVENTIONS:  - Assess and monitor for signs and symptoms of infection  - Monitor lab/diagnostic results  - Monitor all insertion sites, i e  indwelling lines, tubes, and drains  - [place] appropriate cooling/warming therapies per order  - Administer medications as ordered  - Instruct and encourage patient and family to use good hand hygiene technique  - Identify and instruct in appropriate isolation precautions for identified infection/condition   Outcome: Progressing     Problem: SAFETY ADULT  Goal: Patient will remain free of falls  Description  INTERVENTIONS:  - Assess patient frequently for physical needs  -  Identify cognitive and physical deficits and behaviors that affect risk of falls  -  [place] fall precautions as indicated by assessment   - Educate patient/family on patient safety including physical limitations  - Instruct patient to call for assistance with activity based on assessment  - Modify environment to reduce risk of injury  - Consider OT/PT consult to assist with strengthening/mobility  Outcome: Progressing     Problem: DISCHARGE PLANNING  Goal: Discharge to home or other facility with appropriate resources  Description  INTERVENTIONS:  - Identify barriers to discharge w/patient and caregiver  - Arrange for needed discharge resources and transportation as appropriate  - Identify discharge learning needs (meds, wound care, etc )  - Refer to Case Management Department for coordinating discharge planning if the patient needs post-hospital services based on physician/advanced practitioner order or complex needs related to functional status, cognitive ability, or social support system   Outcome: Progressing     Problem: Knowledge Deficit  Goal: Patient/family/caregiver demonstrates understanding of disease process, treatment plan, medications, and discharge instructions  Description  Complete learning assessment and assess knowledge base    Interventions:  - Provide teaching at level of understanding  - Provide teaching via preferred learning methods  Outcome: Progressing

## 2019-09-24 NOTE — PROGRESS NOTES
Progress Note - Matt Granados 1934, 80 y o  female MRN: [id number]    Unit/Bed#: [facility] 727-01 Encounter: [id number]    Primary Care Provider: Whitney Dove MD   Date and time admitted to hospital: 9/19/2019  7:32 AM        * Acute right PCA stroke [facility])  Assessment & Plan  · S/p R P1 thrombectomy 9/19/19 with Dr Tamra Anton  · Neurosurgery note from 9/20 reviewed  They have cleared for dvt ppx, anti-platelet and anticoagulation--they did not agree with radiology reading of petechial hemorrhage  They have signed off, f/u PRN as outpatient   · MRI Brain 9/21: Acute infarctions involving the RIGHT occipital lobe and posterior medial right temporal lobes in the right posterior cerebral artery territory  Acute right thalamic infarction  Discontiguous speckled foci of acute infarction in the parasagittal left frontal lobe in the LEFT anterior cerebral artery territory  Given the multiple vascular territories, a central embolic source should be excluded  · Appreciate neurology input: recommend DOAC, pradaxa to start today if repeat CT head (-)   · PT/OT input appreciated  Will require STR at discharge  CM working on placement     Atrial fibrillation [facility])  Assessment & Plan  · Paroxysmal   · Cardiology following, input appreciated  · Cardiology started oral amiodarone, 200 mg BID  First dose last night   · GIB on Xarelto, Eliquis too expensive  Does not want warfarin due frequent lab work     · Plan to d/c on Pradaxa- cost was addressed by CM and the patient is willing to pay out of pocket for the medication   · Currently on heparin gtt for anticoagulation- modified aPTT goal of 50-70  · check CT head now and if no bleed start pradaxa today and dc heparin gtt     Aspiration pneumonia (HCC)  Assessment & Plan  · Suspected  · S/p 5 days of abx treatment   · Procal 0 12  · Speech therapy input noted, on dysphagia 3 diet with thin liquids   · Aspiration precautions  · WBC slightly improved today  · Encourage incentive spirometry       VTE Pharmacologic Prophylaxis:   Pharmacologic: Heparin Drip  Mechanical VTE Prophylaxis in Place: Yes    Patient Centered Rounds: I have performed bedside rounds with nursing staff today  Discussions with Specialists or Other Care Team Provider: d/w patient  D/w Dr Adelso Garg with neurology      Education and Discussions with Family / Patient: d/w patient  Message left for      Time Spent for Care: 30 minutes  More than 50% of total time spent on counseling and coordination of care as described above  Current Length of Stay: 5 day(s)    Current Patient Status: Inpatient   Certification Statement: The patient will continue to require additional inpatient hospital stay due to monitor on pradaxa     Discharge Plan: d/c in am     Code Status: Level 1 - Full Code      Subjective:   Pt reports she isn't feeling well today  Reports that she felt nauseated this am but did eat half of her breakfast  Denies any other complaints  Objective:     Vitals:   Temp (24hrs), Av 4 °F (36 9 °C), Min:98 2 °F (36 8 °C), Max:98 6 °F (37 °C)    Temp:  [98 2 °F (36 8 °C)-98 6 °F (37 °C)] 98 2 °F (36 8 °C)  HR:  [] 92  Resp:  [16-20] 18  BP: (126-153)/(59-87) 126/59  SpO2:  [93 %-95 %] 94 %  Body mass index is 29 06 kg/m²  Input and Output Summary (last 24 hours): Intake/Output Summary (Last 24 hours) at 2019 1129  Last data filed at 2019 0800  Gross per 24 hour   Intake 240 ml   Output 85 ml   Net 155 ml       Physical Exam:     Physical Exam   Constitutional: She appears well-developed  No distress  Neck: Neck supple  Cardiovascular: Normal rate, normal heart sounds and intact distal pulses  An irregular rhythm present  No murmur heard  Pulmonary/Chest: Breath sounds normal  No respiratory distress  She has no wheezes  She has no rales  Poor effort    Abdominal: Soft  Bowel sounds are normal  She exhibits no distension  There is no tenderness  There is no rebound  Musculoskeletal: She exhibits no edema or tenderness  Neurological: She is alert  A cranial nerve deficit (weakness noted on the left side upper and lower extremities ) is present  Oriented x2  Year reported as 1993   Skin: Skin is warm and dry  No rash noted  She is not diaphoretic  No erythema  Psychiatric:   flat       Additional Data:     Labs:    Results from last 7 days   Lab Units 09/24/19  0558 09/23/19  0745   WBC Thousand/uL 14 46* 16 33*   HEMOGLOBIN g/dL 13 5 13 6   HEMATOCRIT % 42 5 42 7   PLATELETS Thousands/uL 514* 472*   NEUTROS PCT %  --  61   LYMPHS PCT %  --  23   MONOS PCT %  --  10   EOS PCT %  --  4     Results from last 7 days   Lab Units 09/24/19  0558   SODIUM mmol/L 138   POTASSIUM mmol/L 3 6   CHLORIDE mmol/L 105   CO2 mmol/L 26   BUN mg/dL 13   CREATININE mg/dL 0 71   ANION GAP mmol/L 7   CALCIUM mg/dL 8 8   GLUCOSE RANDOM mg/dL 91     Results from last 7 days   Lab Units 09/20/19  1350   INR  1 16         Results from last 7 days   Lab Units 09/20/19  0610   HEMOGLOBIN A1C % 5 5     Results from last 7 days   Lab Units 09/23/19  0745 09/22/19  0742   PROCALCITONIN ng/ml 0 12 0 12           * I Have Reviewed All Lab Data Listed Above  * Additional Pertinent Lab Tests Reviewed:  All Labs Within Last 24 Hours Reviewed      Recent Cultures (last 7 days):           Last 24 Hours Medication List:     Current Facility-Administered Medications:  acetaminophen 650 mg Oral Q6H PRN Era Presser, PA-C    albuterol 2 5 mg Nebulization Q6H PRN Era Presser, PA-C    amiodarone 200 mg Oral BID With Meals JANAK Christianson    amitriptyline 25 mg Oral HS Sagar M Cross, PA-C    amLODIPine 2 5 mg Oral Daily Sagar M Cross, PA-C    atorvastatin 40 mg Oral QPM Sagar M Cross, PA-C    bisacodyl 10 mg Rectal Daily PRN Ary Bath, PA-C    calcium carbonate 1,000 mg Oral BID PRN Teresita Ripple, PA-C    heparin (porcine) 3-20 Units/kg/hr (Order-Specific) Intravenous Titrated Edmar Rudolph PA-C Last Rate: 13 Units/kg/hr (09/23/19 1919)   heparin (porcine)   Code/Trauma/Sedation Geovani Montero MD    metoprolol 5 mg Intravenous Q6H PRN Mckenzie E Held, PA-ROZINA    metoprolol 5 mg Intravenous Once Mckenzie E Held, PA-ROZINA    metoprolol tartrate 25 mg Oral TID Adrienne Salinas MD    nitroGLYcerin  Intra-arterial Code/Trauma/Sedation Geovani Montero MD    nystatin 500,000 Units Swish & Swallow 4x Daily Cyndra Sergeant, VIPUL    ondansetron 4 mg Intravenous Q6H PRN JANAK Dove    PARoxetine 10 mg Oral Daily Sagar Kruger PA-C    polyethylene glycol 17 g Oral Daily PRN Fletcher Vasquez PA-C    senna-docusate sodium 1 tablet Oral HS Fletcher Vasquez PA-C    verapamil  Intra-arterial Code/Trauma/Sedation Geovani Montero MD         Today, Patient Was Seen By: JANAK Dove    ** Please Note: Dictation voice to text software may have been used in the creation of this document   **

## 2019-09-24 NOTE — SPEECH THERAPY NOTE
Speech Language/Pathology    Speech/Language Pathology Progress Note    Patient Name: Joe Friday  [id number] Date: 9/24/2019       Subjective:  Pt upright, oob in chair   setting up pt  Pt is awake, alert, self feeding  Current Diet: level 3 w/ thin  Objective:  Pt seen for dysphagia f/u, dx tx  Noted to have impulsive intake w/ large bites  Prolonged mastication & poor clearing prior to taking additional  Material   The oral cavity is full & mod/max verbal cues were  given for pt to stop & clear  She does not consistently follow & needs physical removal of the fork  Mult swallows needed  Attempted alternated sips of liquids to assist w/ clearing  No overt coughing noted  Some items on tray even on level 3 were too hard she stated  Assessment:  Pt w/ mild/mod oral dysphagia w/ poor oral manipulation & clearing w/ impulsive intake  Benefits from supervision & cues as well as pacing during meals        Plan/Recommendations:  Continue Level 3 w/ thin  Small bites/sips, alternated   Cue pt to clear oral cavity

## 2019-09-24 NOTE — ASSESSMENT & PLAN NOTE
· Suspected  · S/p 5 days of abx treatment   · Procal 0 12  · Speech therapy input noted, on dysphagia 3 diet with thin liquids   · Aspiration precautions  · WBC slightly improved today  · Encourage incentive spirometry

## 2019-09-24 NOTE — ASSESSMENT & PLAN NOTE
· Paroxysmal   · Cardiology following, input appreciated  · Cardiology started oral amiodarone, 200 mg BID  First dose last night   · GIB on Xarelto, Eliquis too expensive  Does not want warfarin due frequent lab work     · Plan to d/c on Pradaxa- cost was addressed by CM and the patient is willing to pay out of pocket for the medication   · Currently on heparin gtt for anticoagulation- modified aPTT goal of 50-70  · check CT head now and if no bleed start pradaxa today and dc heparin gtt

## 2019-09-25 VITALS
BODY MASS INDEX: 29.35 KG/M2 | RESPIRATION RATE: 18 BRPM | DIASTOLIC BLOOD PRESSURE: 59 MMHG | SYSTOLIC BLOOD PRESSURE: 147 MMHG | TEMPERATURE: 98.6 F | WEIGHT: 176.37 LBS | HEART RATE: 60 BPM | OXYGEN SATURATION: 99 %

## 2019-09-25 PROCEDURE — 94760 N-INVAS EAR/PLS OXIMETRY 1: CPT

## 2019-09-25 PROCEDURE — 99239 HOSP IP/OBS DSCHRG MGMT >30: CPT | Performed by: NURSE PRACTITIONER

## 2019-09-25 RX ORDER — AMIODARONE HYDROCHLORIDE 200 MG/1
TABLET ORAL
Refills: 0
Start: 2019-09-25 | End: 2020-04-15

## 2019-09-25 RX ORDER — POLYETHYLENE GLYCOL 3350 17 G/17G
17 POWDER, FOR SOLUTION ORAL DAILY PRN
Qty: 14 EACH | Refills: 0
Start: 2019-09-25

## 2019-09-25 RX ORDER — AMIODARONE HYDROCHLORIDE 200 MG/1
200 TABLET ORAL 2 TIMES DAILY WITH MEALS
Refills: 0
Start: 2019-09-25 | End: 2019-09-25

## 2019-09-25 RX ORDER — ALBUTEROL SULFATE 2.5 MG/3ML
2.5 SOLUTION RESPIRATORY (INHALATION) EVERY 6 HOURS PRN
Refills: 0
Start: 2019-09-25

## 2019-09-25 RX ORDER — ATORVASTATIN CALCIUM 40 MG/1
40 TABLET, FILM COATED ORAL EVERY EVENING
Refills: 0
Start: 2019-09-25

## 2019-09-25 RX ORDER — ACETAMINOPHEN 325 MG/1
650 TABLET ORAL EVERY 6 HOURS PRN
Qty: 30 TABLET | Refills: 0
Start: 2019-09-25

## 2019-09-25 RX ORDER — DABIGATRAN ETEXILATE 150 MG/1
150 CAPSULE, COATED PELLETS ORAL EVERY 12 HOURS SCHEDULED
Refills: 0
Start: 2019-09-25

## 2019-09-25 RX ADMIN — POLYETHYLENE GLYCOL 3350 17 G: 17 POWDER, FOR SOLUTION ORAL at 08:36

## 2019-09-25 RX ADMIN — NYSTATIN 500000 UNITS: 100000 SUSPENSION ORAL at 13:23

## 2019-09-25 RX ADMIN — PAROXETINE 10 MG: 20 TABLET, FILM COATED ORAL at 08:37

## 2019-09-25 RX ADMIN — AMLODIPINE BESYLATE 2.5 MG: 2.5 TABLET ORAL at 08:36

## 2019-09-25 RX ADMIN — METOPROLOL TARTRATE 25 MG: 25 TABLET, FILM COATED ORAL at 08:37

## 2019-09-25 RX ADMIN — DABIGATRAN ETEXILATE MESYLATE 150 MG: 150 CAPSULE ORAL at 08:37

## 2019-09-25 RX ADMIN — AMIODARONE HYDROCHLORIDE 200 MG: 200 TABLET ORAL at 08:36

## 2019-09-25 RX ADMIN — NYSTATIN 500000 UNITS: 100000 SUSPENSION ORAL at 08:36

## 2019-09-25 NOTE — ASSESSMENT & PLAN NOTE
· Paroxysmal atrial fib  · Cardiology following, input appreciated  · Cardiology started oral amiodarone, 200 mg BID  First dose last night, continue BID for 14 days followed by daily  · GIB on Xarelto, Eliquis too expensive  Does not want warfarin due frequent lab work  · Plan to d/c on Pradaxa- cost was addressed by CM and the patient is willing to pay out of pocket for the medication   · CT head: No intracranial hemorrhage   Multiple foci of previously documented ischemic disease, most apparent in the right PCA distribution   No interval hemorrhage  Multifocal Left JAVAN/MCA embolic disease, peripheral in location is not apparent on this exam

## 2019-09-25 NOTE — ASSESSMENT & PLAN NOTE
· Suspected  · S/p 5 days of abx treatment   · Procal 0 12  · Speech therapy input noted, on dysphagia 3 diet with thin liquids   · Aspiration precautions  · WBC continues to improve  · Encourage incentive spirometry

## 2019-09-25 NOTE — TRANSPORTATION MEDICAL NECESSITY
Section I - General Information    Name of Patient: Ministerio Robison                 : 1934    Medicare #: [id number]  Transport Date: 19 (PCS is valid for round trips on this date and for all repetitive trips in the 60-day range as noted below )  Origin: [address] [facility] [address]                                                         Destination: [facility]  Is the pt's stay covered under Medicare Part A (PPS/DRG)   []     Closest appropriate facility? If no, why is transport to more distant facility required? Yes  If hospice pt, is this transport related to pt's terminal illness? NA     Section II - Medical Necessity Questionnaire  Ambulance transportation is medically necessary only if other means of transport are contraindicated or would be potentially harmful to the patient  To meet this requirement, the patient must either be "bed confined" or suffer from a condition such that transport by means other than ambulance is contraindicated by the patient's condition  The following questions must be answered by the medical professional signing below for this form to be valid:    1)  Describe the MEDICAL CONDITION (physical and/or mental) of this patient AT [address] [place] [address] that requires the patient to be transported in an ambulance and why transport by other means is contraindicated by the patient's condition: confused; impulsive; fall risk; acute right PCA stroke; assist x2    2) Is the patient "bed confined" as defined below? No  To be "be confined" the patient must satisfy all three of the following conditions: (1) unable to get up from bed without Assistance; AND (2) unable to ambulate; AND (3) unable to sit in a chair or wheelchair  3) Can this patient safely be transported by car or wheelchair van (i e , seated during transport without a medical attendant or monitoring)?    No    4) In addition to completing questions 1-3 above, please check any of the following conditions that apply*:   *Note: supporting documentation for any boxes checked must be maintained in the patient's medical records  If hosp-hosp transfer, describe services needed at 2nd facility not available at 1st facility? Patient is confused  Medical attendant required   Unable to sit in a chair or wheelchair due to decubitus ulcers or other wounds   Other(specify) fall risk; impulsive    Section III - Signature of Physician or Healthcare Professional  I certify that the above information is true and correct based on my evaluation of this patient, and represent that the patient requires transport by ambulance and that other forms of transport are contraindicated  I understand that this information will be used by the Centers for Medicare and Medicaid Services (CMS) to support the determination of medical necessity for ambulance services, and I represent that I have personal knowledge of the patient's condition at time of transport  [x]  If this box is checked, I also certify that the patient is physically or mentally incapable of signing the ambulance service's claim and that the institution with which I am affiliated has furnished care, services, or assistance to the patient  My signature below is made on behalf of the patient pursuant to 42 CFR §424 36(b)(4)  In accordance with 42 CFR §424 37, the specific reason(s) that the patient is physically or mentally incapable of signing the claim form is as follows: confused  Signature of Physician* or Healthcare Professional____________________________________________  Signature Date 09/25/19 (For scheduled repetitive transports, this form is not valid for transports performed more than 60 days after this date)    Printed Name & Credentials of Physician or Healthcare Professional (MD, DO, RN, etc ) YIMI Mcmanus  *Form must be signed by patient's attending physician for scheduled, repetitive transports   For non-repetitive, unscheduled ambulance transports, if unable to obtain the signature of the attending physician, any of the following may sign (choose appropriate option below)  [] Physician Assistant []  Clinical Nurse Specialist []  Registered Nurse  []  Nurse Practitioner  [x] Discharge Planner

## 2019-09-25 NOTE — PLAN OF CARE
Problem: Prexisting or High Potential for Compromised Skin Integrity  Goal: Skin integrity is maintained or improved  Description  INTERVENTIONS:  - Identify patients at risk for skin breakdown  - Assess and monitor skin integrity  - Assess and monitor nutrition and hydration status  - Monitor labs   - Assess for incontinence   - Turn and reposition patient  - Assist with mobility/ambulation  - Relieve pressure over bony prominences  - Avoid friction and shearing  - Provide appropriate hygiene as needed including keeping skin clean and dry  - Evaluate need for skin moisturizer/barrier cream  - Collaborate with interdisciplinary team   - Patient/family teaching  - Consider wound care consult   Outcome: Progressing     Problem: Neurological Deficit  Goal: Neurological status is stable or improving  Description  Interventions:  - Monitor and assess patient's level of consciousness, motor function, sensory function, and level of assistance needed for ADLs  - Monitor and report changes from baseline  Collaborate with interdisciplinary team to initiate plan and implement interventions as ordered  - Provide and maintain a safe environment  - Consider seizure precautions  - Consider fall precautions  - Consider aspiration precautions  - Consider bleeding precautions  Outcome: Progressing     Problem: Activity Intolerance/Impaired Mobility  Goal: Mobility/activity is maintained at optimum level for patient  Description  Interventions:  - Assess and monitor patient  barriers to mobility and need for assistive/adaptive devices  - Assess patient's emotional response to limitations  - Collaborate with interdisciplinary team and initiate plans and interventions as ordered  - Encourage independent activity per ability   - Maintain proper body alignment  - Perform active/passive rom as tolerated/ordered    - Plan activities to conserve energy   - Turn patient as appropriate  Outcome: Progressing     Problem: Nutrition  Goal: Nutrition/Hydration status is improving  Description  Monitor and assess patient's nutrition/hydration status for malnutrition (ex- brittle hair, bruises, dry skin, pale skin and conjunctiva, muscle wasting, smooth red tongue, and disorientation)  Collaborate with interdisciplinary team and initiate plan and interventions as ordered  Monitor patient's weight and dietary intake as ordered or per policy  Utilize nutrition screening tool and intervene per policy  Determine patient's food preferences and provide high-protein, high-caloric foods as appropriate  - Assist patient with eating   - Allow adequate time for meals   - Encourage patient to take dietary supplement as ordered  - Collaborate with clinical nutritionist   - Include patient/family/caregiver in decisions related to nutrition  Outcome: Progressing     Problem: CONFUSION/THOUGHT DISTURBANCE  Goal: Thought disturbances (confusion, delirium, depression, dementia or psychosis) are managed to maintain or return to baseline mental status and functional level  Description  INTERVENTIONS:  - Assess for possible contributors to  thought disturbance, including but not limited to medications, infection, impaired vision or hearing, underlying metabolic abnormalities, dehydration, respiratory compromise,  psychiatric diagnoses and notify attending PHYSICAN/AP  - Monitor and intervene to maintain adequate nutrition, hydration, elimination, sleep and activity  - Decrease environmental stimuli, including noise as appropriate  - Provide frequent contacts to provide refocusing, direction and reassurance as needed  Approach patient calmly with eye contact and at their level    - [place] high risk fall precautions, aspiration precautions and other safety measures, as indicated  - If delirium suspected, notify physician/AP of change in condition and request immediate in-person evaluation  - Pursue consults as appropriate including Geriatric (campus dependent), OT for cognitive evaluation/activity planning, psychiatric, pastoral care, etc   Outcome: Progressing     Problem: Potential for Falls  Goal: Patient will remain free of falls  Description  INTERVENTIONS:  - Assess patient frequently for physical needs  -  Identify cognitive and physical deficits and behaviors that affect risk of falls    -  [place] fall precautions as indicated by assessment   - Educate patient/family on patient safety including physical limitations  - Instruct patient to call for assistance with activity based on assessment  - Modify environment to reduce risk of injury  - Consider OT/PT consult to assist with strengthening/mobility  Outcome: Progressing     Problem: PAIN - ADULT  Goal: Verbalizes/displays adequate comfort level or baseline comfort level  Description  Interventions:  - Encourage patient to monitor pain and request assistance  - Assess pain using appropriate pain scale  - Administer analgesics based on type and severity of pain and evaluate response  - Implement non-pharmacological measures as appropriate and evaluate response  - Notify physician/advanced practitioner if interventions unsuccessful or patient reports new pain   Outcome: Progressing     Problem: INFECTION - ADULT  Goal: Absence or prevention of progression during hospitalization  Description  INTERVENTIONS:  - Assess and monitor for signs and symptoms of infection  - Monitor lab/diagnostic results  - Monitor all insertion sites, i e  indwelling lines, tubes, and drains  - [place] appropriate cooling/warming therapies per order  - Administer medications as ordered  - Instruct and encourage patient and family to use good hand hygiene technique  - Identify and instruct in appropriate isolation precautions for identified infection/condition   Outcome: Progressing     Problem: SAFETY ADULT  Goal: Patient will remain free of falls  Description  INTERVENTIONS:  - Assess patient frequently for physical needs  -  Identify cognitive and physical deficits and behaviors that affect risk of falls  -  [place] fall precautions as indicated by assessment   - Educate patient/family on patient safety including physical limitations  - Instruct patient to call for assistance with activity based on assessment  - Modify environment to reduce risk of injury  - Consider OT/PT consult to assist with strengthening/mobility  Outcome: Progressing     Problem: DISCHARGE PLANNING  Goal: Discharge to home or other facility with appropriate resources  Description  INTERVENTIONS:  - Identify barriers to discharge w/patient and caregiver  - Arrange for needed discharge resources and transportation as appropriate  - Identify discharge learning needs (meds, wound care, etc )  - Refer to Case Management Department for coordinating discharge planning if the patient needs post-hospital services based on physician/advanced practitioner order or complex needs related to functional status, cognitive ability, or social support system   Outcome: Progressing     Problem: Knowledge Deficit  Goal: Patient/family/caregiver demonstrates understanding of disease process, treatment plan, medications, and discharge instructions  Description  Complete learning assessment and assess knowledge base    Interventions:  - Provide teaching at level of understanding  - Provide teaching via preferred learning methods  Outcome: Progressing

## 2019-09-25 NOTE — SOCIAL WORK
CM was informed that pt is medically stable for dc today  CM spoke to [place] at [facility] who states pt is accepted and a bed is available today  [facility] [place] [facility]  CM notified pt's , pt's bedside RN Steve Wilkinson, and Joaquín garvin at [facility] of dc time  Facility transfer form and CMN completed  CMN on chart  CMN faxed to [facility]  Chart copy requested

## 2019-09-25 NOTE — DISCHARGE SUMMARY
Progress Note - Ministerio Robison 1934, 80 y o  female MRN: [id number]     Unit/Bed#: [facility] 727-01 Encounter: [id number]     Primary Care Provider: Stan Cage MD   Date and time admitted to hospital: 9/19/2019  7:32 AM      Atrial fibrillation ([facility] [address] )  Assessment & Plan  · Paroxysmal atrial fib  · Cardiology following, input appreciated  · Cardiology started oral amiodarone, 200 mg BID  First dose last night, continue BID for 14 days followed by daily  · GIB on Xarelto, Eliquis too expensive  Does not want warfarin due frequent lab work  · Plan to d/c on Pradaxa- cost was addressed by CM and the patient is willing to pay out of pocket for the medication   · CT head: No intracranial hemorrhage   Multiple foci of previously documented ischemic disease, most apparent in the right PCA distribution   No interval hemorrhage  Multifocal Left JAVAN/MCA embolic disease, peripheral in location is not apparent on this exam     [facility] ([facility] )  Assessment & Plan  · Suspected  · S/p 5 days of abx treatment   · Procal 0 12  · Speech therapy input noted, on dysphagia 3 diet with thin liquids   · Aspiration precautions  · WBC continues to improve  · Encourage incentive spirometry     * Acute right PCA stroke [facility])  Assessment & Plan  · S/p R P1 thrombectomy 9/19/19 with Dr Won Zapata  · Neurosurgery note from 9/20 reviewed  They have cleared for dvt ppx, anti-platelet and anticoagulation--they did not agree with radiology reading of petechial hemorrhage  They have signed off, f/u PRN as outpatient   · MRI Brain 9/21: Acute infarctions involving the RIGHT occipital lobe and posterior medial right temporal lobes in the right posterior cerebral artery territory  Acute right thalamic infarction  Discontiguous speckled foci of acute infarction in the parasagittal left frontal lobe in the LEFT anterior cerebral artery territory    Given the multiple vascular territories, a central embolic source should be excluded  · Appreciate neurology input: recommend DOAC, Pradaxa started , Ct head negative  · PT/OT input appreciated, plan discharge today        Discharging Physician / Practitioner: Jai Gifford, [facility]   PCP: Renée Williamson MD  Admission Date:   Admission Orders (From admission, onward)     Ordered        09/19/19 1048  Inpatient Admission  Once                   Discharge Date: 09/25/19    Resolved Problems  Date Reviewed: 9/25/2019    None          Consultations During Hospital Stay:  · Cardiology  · Neurosurgery  · Neurology    Procedures Performed:   · September 19th central line insertion right internal jugular  · Echocardiogram:  EF 94% grade 1 diastolic dysfunction left atrium mildly dilated aortic valve mild regurgitation tricuspid valve trace regurgitation  · September 19, 2019 right PCA thrombectomy  TICI 3 reperfusion    Significant Findings / Test Results:   Xr Chest Portable  Result Date: 9/19/2019  Impression: Right jugular central line tip in satisfactory position  No pneumothorax  Cardiomegaly without vascular congestion  Scattered opacities in both lungs presumably atelectatic although infiltrates are not excluded by this x-ray  Follow-up as clinically appropriate  Workstation performed: [id number]     Ct Head Wo Contrast  Result Date: 9/24/2019  Impression: No intracranial hemorrhage  Multiple foci of previously documented ischemic disease, most apparent in the right PCA distribution  No interval hemorrhage  Multifocal Left JAVAN/MCA embolic disease, peripheral in location is not apparent on this exam  Workstation performed: [id number]     Ct Head Wo Contrast  Result Date: 9/20/2019  Impression: 1  Evolving right PCA territory infarction, status post embolectomy post procedure day #1  Vague foci of increased density along the cortical margin, likely representing petechial hemorrhage  2   Cerebral atrophy with chronic small vessel ischemic change   3   Age-indeterminate infarction anterior aspect right hemipelvis  Follow-up MRI of the brain is recommended for further evaluation  On the preprocedure chest radiograph, however there was an electronic device overlying the superior mediastinum  There are no leads which appear to be attached to the underlying heart to  suggest a pacing device  This does not have a typical appearance for a spinal stimulator orotracheal nerve stimulator  Recommend follow-up chest radiograph prior to MRI  The study was marked in [facility] for immediate notification  Workstation performed: [id number]     Ct Head Wo Contrast  Result Date: 9/19/2019  Impression: 1  New small regions of patchy hyperdensity in the right occipital lobe and posterior medial right temporal lobe, possibly related to contrast stasis and staining associated with regions of ischemia/infarction  Alternatively, small petechial hemorrhages in this region are not excluded but felt to be less likely  Continued clinical and imaging surveillance recommended  3   Moderate, chronic microangiopathy  Workstation performed: [id number]     Mri Brain Wo Contrast  Result Date: 9/21/2019  Impression: 1  Acute infarctions involving the RIGHT occipital lobe and posterior medial right temporal lobes in the right posterior cerebral artery territory  Findings correlate to the perfusion abnormality demonstrating elevated Tmax >6 seconds 2  Acute right thalamic infarction  3   Discontiguous speckled foci of acute infarction in the parasagittal left frontal lobe in the LEFT anterior cerebral artery territory  Given the multiple vascular territories, a central embolic source should be excluded  4   Moderate, chronic microangiopathy  Workstation performed: [id number]     Ct Cerebral Perfusion  Result Date: 9/19/2019  Impression: CT perfusion performed  Data available on PACS  Workstation performed: [id number]     Xr Chest Portable Icu  Result Date: 9/20/2019  Narrative: CHEST INDICATION:   f/u pneumonia   COMPARISON: Chest x-ray performed the previous day  EXAM PERFORMED/VIEWS:  XR CHEST PORTABLE ICU FINDINGS:  Interval extubation and removal of OG tube Right IJ catheter tip in right atrium  projects over the left chest wall Cardiomediastinal silhouette appears unremarkable  Diffuse bilateral interstitial opacities, increased  No pneumothorax  Blunting of the left costophrenic angle is new  Osseous structures appear within normal limits for patient age  Impression: 1  Increased diffuse interstitial opacities 2  Likely small left pleural effusion with adjacent basal opacity, atelectasis versus pneumonia Workstation performed: [id number]     Ir Stroke Alert Result Date: 9/24/2019  · Impression: Right PCA P1 occlusion, TICI 0  Successful mechanical thrombectomy with revascularization of the basilar and right PCA territory, TICI 3  Workstation performed: [id number]     Incidental Findings:   ·     Test Results Pending at Discharge (will require follow up): · None     Outpatient Tests Requested:  · None    Complications:  None    Reason for Admission:  Left hemiparesis and right gaze, transfer from [facility] for mechanical thrombectomy    Hospital Course:     Marisa Hood is a 80 y o  female patient past medical history of hyperlipidemia, asthma, GERD, fibromyalgia, prior CVA who originally presented to the hospital on 9/19/2019 due to left-sided hemiparesis and right gaze  Has history of prior CVA in March with residual right-sided weakness in atrial fibrillation who was off anticoagulation for the past 2 weeks due to hemoptysis  She was found to have left-sided hemiparesis and right gaze  She was transfer from [facility] for mechanical thrombectomy  According to medical record on the transfer to [facility] she reportedly aspirated x4    CAT Scan showed a new small region of patchy hyperdensity in the right occipital lobe and posterior medial right temporal lobe possibly related to contrast stasis and staining associated with regions of ischemia and infarction  Small petechial hemorrhage were noted  She was found to have a right PCA stroke and Patient underwent a right P1 thrombectomy by neurosurgery Dr Fritz Eid  Neurosurgery was consulted regarding the abnormal CAT Scan  There was concern on the CAT Scan that she had petechial hemorrhage but this was not noted by the neuro surgical team   They felt she was cleared from a neurosurgical standpoint to undergo deep vein thrombosis prophylaxis anti-platelet anticoagulation if needed  She was admitted to ICU post procedure  Neurology was consulted  MRI of the brain on 09/21 showed acute infarctions involving the right occipital lobe and posterior medial right temporal lobes in the right posterior cerebral artery territory  Acute right thalamic infarction  Discontinuous speckled foci of acute infarction in the parasagittal left frontal lobe in the left anterior cerebral artery territory  Given the multiple vascular territories a central embolic source should be excluded  She was maintained on stroke protocol  Neuro checks remained stable  Oral anticoagulation was decided  Cardiology was consulted for history of chronic atrial fibrillation  She had been offer anticoagulation due to hemoptysis therefore was suspected that her chronic atrial fibrillation may have been the etiology of her acute right PCA CVA  She was noted to have intermittent bouts of RVR with heart rates in the 150-160 range  She was started on oral amiodarone at time of discharge she was discharged on amiodarone 200 mg b i d  For 14 days of which she will follow this with 200 mg daily  She was advised to follow up with her cardiologist at [facility]   Initially she was placed on a heparin drip for anticoagulation of which she was transition to an oral NOAC  Patient did not prefer to have Coumadin given the frequent blood work    She had a GI bleed on Xarelto and Eliquis is too expensive  Case management did price out the Pradaxa of which patient was able to afford this medication therefore she was discharged on Pradaxa  She did have a CT head prior to the Pradaxa of which there was no noted bleed  It was suspected that she had aspiration pneumonia and she was treated for 5 days with antibiotics  Speech therapy did evaluate her and recommended a dysphagia 3 diet with thin liquids  Would advise speech to evaluate patient during her stay in rehab to see if her diet can be advanced  PT OT are also recommended as there evaluations during her hospital stay indicated she would benefit from short-term rehab  Addendum patient should be on amiodarone 200 mg twice a day for 14 days then 200 mg daily  She should continue on Pradaxa 150 mg twice a day for stroke prevention  She has an appointment with Dr Ledezma at [facility] scheduled for mid October  Please see above list of diagnoses and related plan for additional information  Condition at Discharge: stable     Discharge Day Visit / Exam:     Subjective:  Denies chest pain or shortness of breath no nausea or vomiting no abdominal pain tolerating diet sitting out of bed to chair  Vitals: Blood Pressure: 147/59 (09/25/19 0718)  Pulse: 60 (09/25/19 0718)  Temperature: 98 6 °F (37 °C) (09/25/19 0718)  Temp Source: Probe (09/19/19 1000)  Respirations: 18 (09/25/19 0718)  Weight - Scale: 80 kg (176 lb 5 9 oz) (09/25/19 0559)  SpO2: 99 % (09/25/19 0756)  Exam:   Physical Exam   Constitutional: She is oriented to person, place, and time  She appears well-developed and well-nourished  No distress  HENT:   Head: Normocephalic and atraumatic  Mouth/Throat: Oropharynx is clear and moist    Pulmonary/Chest: Effort normal  No respiratory distress  Fine crackles in bilateral bases   Abdominal: Soft  Bowel sounds are normal  She exhibits no distension  There is no tenderness     Musculoskeletal: Normal range of motion  She exhibits no edema  Neurological: She is alert and oriented to person, place, and time  Forgetful   Skin: Skin is warm and dry  Psychiatric: Her behavior is normal    Affect flat   Vitals reviewed  Discussion with Family:n/a    Discharge instructions/Information to patient and family:   See after visit summary for information provided to patient and family  Provisions for Follow-Up Care:  See after visit summary for information related to follow-up care and any pertinent home health orders  Disposition:     Acute Rehab at [facility]    For Discharges to [facility]:   · Not Applicable to this Patient - Not Applicable to this Patient    Planned Readmission:  Not anticipated     Discharge Statement:  I spent 40 minutes discharging the patient  This time was spent on the day of discharge  I had direct contact with the patient on the day of discharge  Greater than 50% of the total time was spent examining patient, answering all patient questions, arranging and discussing plan of care with patient as well as directly providing post-discharge instructions  Additional time then spent on discharge activities  Discharge Medications:  See after visit summary for reconciled discharge medications provided to patient and family        ** Please Note: This note has been constructed using a voice recognition system **

## 2019-09-25 NOTE — RESTORATIVE TECHNICIAN NOTE
Restorative Specialist Mobility Note       Activity: Ambulate in rosario, Ambulate in room, Bathroom privileges, Chair, Dangle, Stand at bedside(Educated/encouraged pt to ambulate with assistance 3-4 x's/day  Chair alarm on   Pt callbell, phone/tray within reach )     Assistive Device: Front wheel walker, Other (Comment)(Assist x1 with chair follow)          Az RAMEY, Restorative Technician, [employer]

## 2019-09-25 NOTE — QUICK NOTE
Patient will be discharged to rehab at 1430 today  Amiodarone to continue as 200 mg BID x 2 weeks and then switch to 200 mg daily  She can transition to once daily dosing on 10/7/19  She should continue on Pradaxa 150 mg BID for stroke prevention  She has an appointment with Dr Jeanmarie Chaney a cardiologist at [facility] scheduled for mid October  This was discussed with patient's attending provider

## 2019-09-25 NOTE — DISCHARGE INSTR - AVS FIRST PAGE
Amiodarone 200 mg BID for 14 days then 200 mg daily thereafter   [facility]

## 2019-09-25 NOTE — PROGRESS NOTES
Progress Note - Sergei Castillo 1934, 80 y o  female MRN: [id number]    Unit/Bed#: [facility] 727-01 Encounter: [id number]    Primary Care Provider: Malou Freeman MD   Date and time admitted to hospital: 9/19/2019  7:32 AM        Atrial fibrillation ([facility] [address] )  Assessment & Plan  · Paroxysmal atrial fib  · Cardiology following, input appreciated  · Cardiology started oral amiodarone, 200 mg BID  First dose last night, continue BID for 14 days followed by daily  · GIB on Xarelto, Eliquis too expensive  Does not want warfarin due frequent lab work  · Plan to d/c on Pradaxa- cost was addressed by CM and the patient is willing to pay out of pocket for the medication   · CT head: No intracranial hemorrhage   Multiple foci of previously documented ischemic disease, most apparent in the right PCA distribution   No interval hemorrhage  Multifocal Left JAVAN/MCA embolic disease, peripheral in location is not apparent on this exam     [facility] ([facility] )  Assessment & Plan  · Suspected  · S/p 5 days of abx treatment   · Procal 0 12  · Speech therapy input noted, on dysphagia 3 diet with thin liquids   · Aspiration precautions  · WBC continues to improve  · Encourage incentive spirometry     * Acute right PCA stroke [facility])  Assessment & Plan  · S/p R P1 thrombectomy 9/19/19 with Dr Tal Rodriguez  · Neurosurgery note from 9/20 reviewed  They have cleared for dvt ppx, anti-platelet and anticoagulation--they did not agree with radiology reading of petechial hemorrhage  They have signed off, f/u PRN as outpatient   · MRI Brain 9/21: Acute infarctions involving the RIGHT occipital lobe and posterior medial right temporal lobes in the right posterior cerebral artery territory  Acute right thalamic infarction  Discontiguous speckled foci of acute infarction in the parasagittal left frontal lobe in the LEFT anterior cerebral artery territory    Given the multiple vascular territories, a central embolic source should be excluded  · Appreciate neurology input: recommend DOAC, Pradaxa started , Ct head negative  · PT/OT input appreciated, plan discharge today        Discharging Physician / Practitioner: Surendra Negro, [address]  PCP: Citlali Abbott MD  Admission Date:   Admission Orders (From admission, onward)     Ordered        09/19/19 1048  Inpatient Admission  Once                   Discharge Date: 09/25/19    Resolved Problems  Date Reviewed: 9/25/2019    None          Consultations During Hospital Stay:  · Cardiology  · Neurosurgery  · Neurology    Procedures Performed:   · September 19th central line insertion right internal jugular  · Echocardiogram:  EF 75% grade 1 diastolic dysfunction left atrium mildly dilated aortic valve mild regurgitation tricuspid valve trace regurgitation  · September 19, 2019 right PCA thrombectomy  TICI 3 reperfusion    Significant Findings / Test Results:   Xr Chest Portable  Result Date: 9/19/2019  Impression: Right jugular central line tip in satisfactory position  No pneumothorax  Cardiomegaly without vascular congestion  Scattered opacities in both lungs presumably atelectatic although infiltrates are not excluded by this x-ray  Follow-up as clinically appropriate  Workstation performed: [id number]     Ct Head Wo Contrast  Result Date: 9/24/2019  Impression: No intracranial hemorrhage  Multiple foci of previously documented ischemic disease, most apparent in the right PCA distribution  No interval hemorrhage  Multifocal Left JAVAN/MCA embolic disease, peripheral in location is not apparent on this exam  Workstation performed: [id number]     Ct Head Wo Contrast  Result Date: 9/20/2019  Impression: 1  Evolving right PCA territory infarction, status post embolectomy post procedure day #1  Vague foci of increased density along the cortical margin, likely representing petechial hemorrhage  2   Cerebral atrophy with chronic small vessel ischemic change   3   Age-indeterminate infarction anterior aspect right hemipelvis  Follow-up MRI of the brain is recommended for further evaluation  On the preprocedure chest radiograph, however there was an electronic device overlying the superior mediastinum  There are no leads which appear to be attached to the underlying heart to  suggest a pacing device  This does not have a typical appearance for a spinal stimulator orotracheal nerve stimulator  Recommend follow-up chest radiograph prior to MRI  The study was marked in [facility] for immediate notification  Workstation performed: [id number]     Ct Head Wo Contrast  Result Date: 9/19/2019  Impression: 1  New small regions of patchy hyperdensity in the right occipital lobe and posterior medial right temporal lobe, possibly related to contrast stasis and staining associated with regions of ischemia/infarction  Alternatively, small petechial hemorrhages in this region are not excluded but felt to be less likely  Continued clinical and imaging surveillance recommended  3   Moderate, chronic microangiopathy  Workstation performed: [id number]     Mri Brain Wo Contrast  Result Date: 9/21/2019  Impression: 1  Acute infarctions involving the RIGHT occipital lobe and posterior medial right temporal lobes in the right posterior cerebral artery territory  Findings correlate to the perfusion abnormality demonstrating elevated Tmax >6 seconds 2  Acute right thalamic infarction  3   Discontiguous speckled foci of acute infarction in the parasagittal left frontal lobe in the LEFT anterior cerebral artery territory  Given the multiple vascular territories, a central embolic source should be excluded  4   Moderate, chronic microangiopathy  Workstation performed: [id number]     Ct Cerebral Perfusion  Result Date: 9/19/2019  Impression: CT perfusion performed  Data available on PACS  Workstation performed: [id number]     Xr Chest Portable Icu  Result Date: 9/20/2019  Narrative: CHEST INDICATION:   f/u pneumonia   COMPARISON: Chest x-ray performed the previous day  EXAM PERFORMED/VIEWS:  XR CHEST PORTABLE ICU FINDINGS:  Interval extubation and removal of OG tube Right IJ catheter tip in right atrium  projects over the left chest wall Cardiomediastinal silhouette appears unremarkable  Diffuse bilateral interstitial opacities, increased  No pneumothorax  Blunting of the left costophrenic angle is new  Osseous structures appear within normal limits for patient age  Impression: 1  Increased diffuse interstitial opacities 2  Likely small left pleural effusion with adjacent basal opacity, atelectasis versus pneumonia Workstation performed: [id number]     Ir Stroke Alert Result Date: 9/24/2019  · Impression: Right PCA P1 occlusion, TICI 0  Successful mechanical thrombectomy with revascularization of the basilar and right PCA territory, TICI 3  Workstation performed: [id number]     Incidental Findings:   ·     Test Results Pending at Discharge (will require follow up): · None     Outpatient Tests Requested:  · None    Complications:  None    Reason for Admission:  Left hemiparesis and right gaze, transfer from [facility] for mechanical thrombectomy    Hospital Course:     Johan Boggs is a 80 y o  female patient past medical history of hyperlipidemia, asthma, GERD, fibromyalgia, prior CVA who originally presented to the hospital on 9/19/2019 due to left-sided hemiparesis and right gaze  Has history of prior CVA in March with residual right-sided weakness in atrial fibrillation who was off anticoagulation for the past 2 weeks due to hemoptysis  She was found to have left-sided hemiparesis and right gaze  She was transfer from [facility] for mechanical thrombectomy  According to medical record on the transfer to [facility] she reportedly aspirated x4    CAT Scan showed a new small region of patchy hyperdensity in the right occipital lobe and posterior medial right temporal lobe possibly related to contrast stasis and staining associated with regions of ischemia and infarction  Small petechial hemorrhage were noted  She was found to have a right PCA stroke and Patient underwent a right P1 thrombectomy by neurosurgery Dr Kaylin Rapp  Neurosurgery was consulted regarding the abnormal CAT Scan  There was concern on the CAT Scan that she had petechial hemorrhage but this was not noted by the neuro surgical team   They felt she was cleared from a neurosurgical standpoint to undergo deep vein thrombosis prophylaxis anti-platelet anticoagulation if needed  She was admitted to ICU post procedure  Neurology was consulted  MRI of the brain on 09/21 showed acute infarctions involving the right occipital lobe and posterior medial right temporal lobes in the right posterior cerebral artery territory  Acute right thalamic infarction  Discontinuous speckled foci of acute infarction in the parasagittal left frontal lobe in the left anterior cerebral artery territory  Given the multiple vascular territories a central embolic source should be excluded  She was maintained on stroke protocol  Neuro checks remained stable  Oral anticoagulation was decided  Cardiology was consulted for history of chronic atrial fibrillation  She had been offer anticoagulation due to hemoptysis therefore was suspected that her chronic atrial fibrillation may have been the etiology of her acute right PCA CVA  She was noted to have intermittent bouts of RVR with heart rates in the 150-160 range  She was started on oral amiodarone at time of discharge she was discharged on amiodarone 200 mg b i d  For 14 days of which she will follow this with 200 mg daily  She was advised to follow up with her cardiologist at [facility]   Initially she was placed on a heparin drip for anticoagulation of which she was transition to an oral NOAC  Patient did not prefer to have Coumadin given the frequent blood work    She had a GI bleed on Xarelto and Eliquis is too expensive  Case management did price out the Pradaxa of which patient was able to afford this medication therefore she was discharged on Pradaxa  She did have a CT head prior to the Pradaxa of which there was no noted bleed  It was suspected that she had aspiration pneumonia and she was treated for 5 days with antibiotics  Speech therapy did evaluate her and recommended a dysphagia 3 diet with thin liquids  Would advise speech to evaluate patient during her stay in rehab to see if her diet can be advanced  PT OT are also recommended as there evaluations during her hospital stay indicated she would benefit from short-term rehab  Addendum patient should be on amiodarone 200 mg twice a day for 14 days then 200 mg daily  She should continue on Pradaxa 150 mg twice a day for stroke prevention  She has an appointment with Dr Ledezma at [facility] scheduled for mid October  Please see above list of diagnoses and related plan for additional information  Condition at Discharge: stable     Discharge Day Visit / Exam:     Subjective:  Denies chest pain or shortness of breath no nausea or vomiting no abdominal pain tolerating diet sitting out of bed to chair  Vitals: Blood Pressure: 147/59 (09/25/19 0718)  Pulse: 60 (09/25/19 0718)  Temperature: 98 6 °F (37 °C) (09/25/19 0718)  Temp Source: Probe (09/19/19 1000)  Respirations: 18 (09/25/19 0718)  Weight - Scale: 80 kg (176 lb 5 9 oz) (09/25/19 0559)  SpO2: 99 % (09/25/19 0756)  Exam:   Physical Exam   Constitutional: She is oriented to person, place, and time  She appears well-developed and well-nourished  No distress  HENT:   Head: Normocephalic and atraumatic  Mouth/Throat: Oropharynx is clear and moist    Pulmonary/Chest: Effort normal  No respiratory distress  Fine crackles in bilateral bases   Abdominal: Soft  Bowel sounds are normal  She exhibits no distension  There is no tenderness     Musculoskeletal: Normal range of motion  She exhibits no edema  Neurological: She is alert and oriented to person, place, and time  Forgetful   Skin: Skin is warm and dry  Psychiatric: Her behavior is normal    Affect flat   Vitals reviewed  Discussion with Family:n/a    Discharge instructions/Information to patient and family:   See after visit summary for information provided to patient and family  Provisions for Follow-Up Care:  See after visit summary for information related to follow-up care and any pertinent home health orders  Disposition:     Acute Rehab at [facility]    For Discharges to [facility] SNF:   · Not Applicable to this Patient - Not Applicable to this Patient    Planned Readmission:  Not anticipated     Discharge Statement:  I spent 40 minutes discharging the patient  This time was spent on the day of discharge  I had direct contact with the patient on the day of discharge  Greater than 50% of the total time was spent examining patient, answering all patient questions, arranging and discussing plan of care with patient as well as directly providing post-discharge instructions  Additional time then spent on discharge activities  Discharge Medications:  See after visit summary for reconciled discharge medications provided to patient and family        ** Please Note: This note has been constructed using a voice recognition system **

## 2019-09-26 ENCOUNTER — TELEPHONE (OUTPATIENT)
Dept: NEUROLOGY | Facility: CLINIC | Age: 84
End: 2019-09-26

## 2019-09-26 NOTE — TELEPHONE ENCOUNTER
----- Message from Renetta Gómez PA-C sent at 9/22/2019  2:51 PM EDT -----  Regarding: HFU    Diagnosis/Reason for follow-up: Right PCA CVA s/p thrombectomy  Subspecialty for follow-up: Stroke  Attending or AP?: Either   Existing neurologist: None  Tests/Labs/Imaging ordered: None    On anticoagulation

## 2019-09-30 ENCOUNTER — TELEPHONE (OUTPATIENT)
Dept: NEUROLOGY | Facility: CLINIC | Age: 84
End: 2019-09-30

## 2019-09-30 NOTE — TELEPHONE ENCOUNTER
The purpose of this phone call is to assess patient's general wellbeing or for any assistance needed with follow-up care  Patient needs stroke hospital follow up appointment scheduled  According to chart, patient discharged to [address] [place] [address] [phone]  Called facility, unit timmyemery Guerra transferred my call to [address] [place], [place] [address]  Since discharge, patient has not experienced any new or worsening stroke symptoms  Generalized weakness noted, not unilateral  Face symmetrical, no dysphagia, no dysarthria   strength equal in bilateral hands  Patient is being evaluated by therapy, no determination on ambulatory device  Mobilizes via wheelchair at this time, transfer assistance with 2 and the use of a walker  Requires assistance with ADLs  No follow up with PCP until after discharge  No estimated discharge date at this time  I scheduled stroke hospital follow up 10/18 with Adriane Calvo in [facility] at 9:30 am  There have not been any medication changes since discharge  BP was low this morning and BP meds held  SBP was 91  No other missed doses of medication, no reported side effects  No signs of bleeding  VALERIA Duke does not have any questions or concerns at this time

## 2019-10-18 ENCOUNTER — TELEPHONE (OUTPATIENT)
Dept: NEUROLOGY | Facility: CLINIC | Age: 84
End: 2019-10-18

## 2019-10-18 NOTE — TELEPHONE ENCOUNTER
21/30 day post discharge follow up call  The purpose of this phone call is to assess patient's general wellbeing or for any assistance needed with follow-up care  Called patient with no answer  Left message on voicemail box for call back

## 2019-10-23 NOTE — TELEPHONE ENCOUNTER
Called patient,  answered states patient is still at rehab  States she was discharged from [facility] but could not remember where  After searching his medical forms, found info for [employer], gave me the number [phone]  Called, reached wrong number  Researched [employer], found number [phone] with no answer  Left message on voicemail box for call back

## 2019-10-25 ENCOUNTER — TELEPHONE (OUTPATIENT)
Dept: NEUROLOGY | Facility: CLINIC | Age: 84
End: 2019-10-25

## 2019-10-25 NOTE — TELEPHONE ENCOUNTER
Called facility with no answer  Left message on voicemail box for call back  Second failed attempt to contact facility for follow up call  Closing encounter  Will reopen when/if call is returned

## 2019-10-25 NOTE — TELEPHONE ENCOUNTER
Received a call from  stating she was returning my call for follow up  States she will transfer to unit  Transferred to unit, spoke with Monique Jose  She states RN caring for patient is not available at this time  States she will call me back

## 2019-12-08 ENCOUNTER — APPOINTMENT (EMERGENCY)
Dept: RADIOLOGY | Facility: HOSPITAL | Age: 84
End: 2019-12-08
Payer: MEDICARE

## 2019-12-08 ENCOUNTER — HOSPITAL ENCOUNTER (EMERGENCY)
Facility: HOSPITAL | Age: 84
Discharge: HOME | End: 2019-12-08
Attending: EMERGENCY MEDICINE | Admitting: EMERGENCY MEDICINE
Payer: MEDICARE

## 2019-12-08 VITALS
BODY MASS INDEX: 26.29 KG/M2 | TEMPERATURE: 98.7 F | HEART RATE: 63 BPM | WEIGHT: 158 LBS | SYSTOLIC BLOOD PRESSURE: 157 MMHG | OXYGEN SATURATION: 92 % | DIASTOLIC BLOOD PRESSURE: 65 MMHG | RESPIRATION RATE: 20 BRPM

## 2019-12-08 DIAGNOSIS — R93.89 ABNORMAL CHEST CT: ICD-10-CM
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
## 2019-12-08 DIAGNOSIS — E87.6 HYPOKALEMIA: ICD-10-CM

## 2019-12-08 LAB
ANION GAP SERPL CALCULATED.3IONS-SCNC: 7 MMOL/L (ref 4–13)
BASOPHILS # BLD AUTO: 0.14 THOUSANDS/ΜL (ref 0–0.1)
BASOPHILS NFR BLD AUTO: 1 % (ref 0–1)
BILIRUB UR QL STRIP: ABNORMAL
BUN SERPL-MCNC: 17 MG/DL (ref 5–25)
CALCIUM SERPL-MCNC: 8.9 MG/DL (ref 8.3–10.1)
CHLORIDE SERPL-SCNC: 108 MMOL/L (ref 100–108)
CLARITY UR: CLEAR
CO2 SERPL-SCNC: 26 MMOL/L (ref 21–32)
COLOR UR: YELLOW
COLOR, POC: NORMAL
CREAT SERPL-MCNC: 0.88 MG/DL (ref 0.6–1.3)
EOSINOPHIL # BLD AUTO: 0.4 THOUSAND/ΜL (ref 0–0.61)
EOSINOPHIL NFR BLD AUTO: 3 % (ref 0–6)
ERYTHROCYTE [DISTWIDTH] IN BLOOD BY AUTOMATED COUNT: 17.2 % (ref 11.6–15.1)
GFR SERPL CREATININE-BSD FRML MDRD: 60 ML/MIN/1.73SQ M
GLUCOSE SERPL-MCNC: 93 MG/DL (ref 65–140)
GLUCOSE UR STRIP-MCNC: NEGATIVE MG/DL
HCT VFR BLD AUTO: 44.5 % (ref 34.8–46.1)
HGB BLD-MCNC: 13.7 G/DL (ref 11.5–15.4)
HGB UR QL STRIP.AUTO: NEGATIVE
IMM GRANULOCYTES # BLD AUTO: 0.05 THOUSAND/UL (ref 0–0.2)
IMM GRANULOCYTES NFR BLD AUTO: 0 % (ref 0–2)
KETONES UR STRIP-MCNC: NEGATIVE MG/DL
LEUKOCYTE ESTERASE UR QL STRIP: NEGATIVE
LYMPHOCYTES # BLD AUTO: 3.04 THOUSANDS/ΜL (ref 0.6–4.47)
LYMPHOCYTES NFR BLD AUTO: 20 % (ref 14–44)
MCH RBC QN AUTO: 24.3 PG (ref 26.8–34.3)
MCHC RBC AUTO-ENTMCNC: 30.8 G/DL (ref 31.4–37.4)
MCV RBC AUTO: 79 FL (ref 82–98)
MONOCYTES # BLD AUTO: 1.52 THOUSAND/ΜL (ref 0.17–1.22)
MONOCYTES NFR BLD AUTO: 10 % (ref 4–12)
NEUTROPHILS # BLD AUTO: 10.06 THOUSANDS/ΜL (ref 1.85–7.62)
NEUTS SEG NFR BLD AUTO: 66 % (ref 43–75)
NITRITE UR QL STRIP: NEGATIVE
NRBC BLD AUTO-RTO: 0 /100 WBCS
PH UR STRIP.AUTO: 5.5 [PH] (ref 4.5–8)
PLATELET # BLD AUTO: 604 THOUSANDS/UL (ref 149–390)
PMV BLD AUTO: 9.6 FL (ref 8.9–12.7)
POTASSIUM SERPL-SCNC: 3.2 MMOL/L (ref 3.5–5.3)
PROT UR STRIP-MCNC: NEGATIVE MG/DL
RBC # BLD AUTO: 5.63 MILLION/UL (ref 3.81–5.12)
SODIUM SERPL-SCNC: 141 MMOL/L (ref 136–145)
SP GR UR STRIP.AUTO: 1.02 (ref 1–1.03)
UROBILINOGEN UR QL STRIP.AUTO: 1 E.U./DL
WBC # BLD AUTO: 15.21 THOUSAND/UL (ref 4.31–10.16)

## 2019-12-08 PROCEDURE — 81003 URINALYSIS AUTO W/O SCOPE: CPT

## 2019-12-08 PROCEDURE — 72125 CT NECK SPINE W/O DYE: CPT

## 2019-12-08 PROCEDURE — 71250 CT THORAX DX C-: CPT

## 2019-12-08 PROCEDURE — 85025 COMPLETE CBC W/AUTO DIFF WBC: CPT | Performed by: EMERGENCY MEDICINE

## 2019-12-08 PROCEDURE — 99283 EMERGENCY DEPT VISIT LOW MDM: CPT | Performed by: EMERGENCY MEDICINE

## 2019-12-08 PROCEDURE — 71046 X-RAY EXAM CHEST 2 VIEWS: CPT

## 2019-12-08 PROCEDURE — 99284 EMERGENCY DEPT VISIT MOD MDM: CPT

## 2019-12-08 PROCEDURE — 70450 CT HEAD/BRAIN W/O DYE: CPT

## 2019-12-08 PROCEDURE — 36415 COLL VENOUS BLD VENIPUNCTURE: CPT | Performed by: EMERGENCY MEDICINE

## 2019-12-08 PROCEDURE — 80048 BASIC METABOLIC PNL TOTAL CA: CPT | Performed by: EMERGENCY MEDICINE

## 2019-12-08 PROCEDURE — 73060 X-RAY EXAM OF HUMERUS: CPT

## 2019-12-08 RX ORDER — POTASSIUM CHLORIDE 20 MEQ/1
40 TABLET, EXTENDED RELEASE ORAL ONCE
Status: COMPLETED | OUTPATIENT
Start: 2019-12-08 | End: 2019-12-08

## 2019-12-08 RX ADMIN — POTASSIUM CHLORIDE 40 MEQ: 1500 TABLET, EXTENDED RELEASE ORAL at 05:53

## 2019-12-08 NOTE — ED NOTES
Transportation arranged via [place] for approx [address], [address] [facility]  12/08/19 9388

## 2019-12-08 NOTE — ED ATTENDING ATTESTATION
Mary Dewitt MD, saw and evaluated the patient  All available labs and X-rays were ordered by me or the resident and have been reviewed by myself  I discussed the patient with the resident / non-physician and agree with the resident's / non-physician practitioner's findings and plan as documented in the resident's / non-physician practicitioner's note, except where noted  At this point, I agree with the current assessment done in the ED  I was present during key portions of all procedures performed unless otherwise stated  Chief Complaint   Patient presents with    Fall     per ems - patient resides at [facility], fall OOB  Denies headstrike, - thinners  Patient has no complaints     This is an 79 y/o F presenting for unwitnessed fall presenting for a fall  She states that she was sitting in a chair, leaned forwards then stood up to look out window, then fell  Denies head strike, remembers events  No acute complaints except LEFT proximal humeral pain  No f/ch/n/v/cp/sob  PMH:  - Dementia  - astham  - Fibromyalgia  - AF  - HLD  PSH:  - Hysterectomy  - Varsha  No smoking drinking drugs  PE:  Vitals:    12/08/19 0110 12/08/19 0113 12/08/19 0254 12/08/19 0455   BP: 160/70  165/72 157/65   BP Location:   Right arm    Pulse: 67  68 63   Resp: 20  20 20   Temp: 98 7 °F (37 1 °C)      TempSrc: Oral      SpO2: 92%  93% 92%   Weight: 77 1 kg (170 lb) 71 7 kg (158 lb)     General: VSS, NAD, awake, alert  Well-nourished, well-developed  Appears stated age  Speaking normally in full sentences  Head: Normocephalic, atraumatic, nontender  Eyes: PERRL, EOM-I  No diplopia  No hyphema  No subconjunctival hemorrhages  Symmetrical lids  ENT: Atraumatic external nose and ears  MMM  No malocclusion  No stridor  Normal phonation  No drooling  Normal swallowing  Neck: Symmetric, trachea midline  No JVD  CV: irregular  +C3/B6  Systolic grade 2 murmur   Peripheral pulses +2 throughout   No chest wall tenderness  Lungs:   Unlabored No retractions  CTAB, lungs sounds equal bilateral    No tachypnea  Abd: +BS, soft, NT/ND    MSK:   FROM   She's not moving at hte LEFT shoudler because of pain  Has a large ecchymotic area here on lateral proximal left humerus  Back:   No rashes  Skin: Dry, intact  Neuro: AAOx3, GCS 15, CN II-XII grossly intact  Motor grossly intact  Psychiatric/Behavioral: Appropriate mood and affect   Exam: deferred  A:  - Fall  - Dementia  P:  - No acute complaints and had a fall that she immediately told staff about  - Will do CTH/CTC given mechanism  - likely DC home  - Pradaxa per previous   - 13 point ROS was performed and all are normal unless stated in the history above  - Nursing note reviewed  Vitals reviewed  - Orders placed by myself and/or advanced practitioner / resident     - Previous chart was reviewed  - No language barrier    - History obtained from patient  - There are limitations to the history obtained  Reasons ROS could not be obtained: dementia  - Critical care time: Not applicable for this patient  Code Status: Prior  Advance Directive and Living Will:      Power of :    POLST:      Final Diagnosis:  1  Fall, initial encounter    2  Abnormal chest CT    3  Hypokalemia        ED Course as of Dec 08 0641   [facility] Dec 08, 2019   2886 Urine requested by NH  Will do so  Medications   potassium chloride (K-DUR,KLOR-CON) CR tablet 40 mEq (40 mEq Oral Given 12/8/19 0553)     CT chest without contrast   ED Interpretation   Ground glass appearance in RLL? No large effusion  No hemothorax  Awaiting formal read  Final Result      No evidence of acute traumatic injury throughout the chest       Bilateral areas of bronchiectasis, peripheral tree-in-bud pattern with micronodularity, and mosaic attenuation suggesting chronic large and small airways disease with peripheral mucoid impaction                 Workstation performed: [id number] XR chest 2 views   ED Interpretation   RLL pneumonia  Given fall, the meniscus could be small amount of blood? LLL effusion   Atelectasis   Cephalization at top, more on the right   Deviation of hte trachea      CT head without contrast   Final Result      No acute intracranial abnormality  Workstation performed: [id number]         CT cervical spine without contrast   Final Result      No cervical spine fracture or traumatic malalignment  Airspace opacities in the lung apices which may represent pulmonary edema                   Workstation performed: [id number]         XR humerus LEFT   ED Interpretation   No fracture        Orders Placed This Encounter   Procedures    CT head without contrast    CT cervical spine without contrast    XR humerus LEFT    XR chest 2 views    CT chest without contrast    CBC and differential    Basic metabolic panel    Straight cath    POCT urinalysis dipstick     Labs Reviewed   BASIC METABOLIC PANEL - Abnormal       Result Value Ref Range Status    Sodium 141  136 - 145 mmol/L Final    Potassium 3 2 (*) 3 5 - 5 3 mmol/L Final    Chloride 108  100 - 108 mmol/L Final    CO2 26  21 - 32 mmol/L Final    ANION GAP 7  4 - 13 mmol/L Final    BUN 17  5 - 25 mg/dL Final    Creatinine 0 88  0 60 - 1 30 mg/dL Final    Comment: Standardized to IDMS reference method    Glucose 93  65 - 140 mg/dL Final    Comment:   If the patient is fasting, the ADA then defines impaired fasting glucose as > 100 mg/dL and diabetes as > or equal to 123 mg/dL  Specimen collection should occur prior to Sulfasalazine administration due to the potential for falsely depressed results  Specimen collection should occur prior to Sulfapyridine administration due to the potential for falsely elevated results      Calcium 8 9  8 3 - 10 1 mg/dL Final    eGFR 60  ml/min/1 73sq m Final    Narrative:     [place] guidelines for Chronic Kidney Disease (CKD):     Stage 1 with normal or high GFR (GFR > 90 mL/min/1 73 square meters)    Stage 2 Mild CKD (GFR = 60-89 mL/min/1 73 square meters)    Stage 3A Moderate CKD (GFR = 45-59 mL/min/1 73 square meters)    Stage 3B Moderate CKD (GFR = 30-44 mL/min/1 73 square meters)    Stage 4 Severe CKD (GFR = 15-29 mL/min/1 73 square meters)    Stage 5 End Stage CKD (GFR <15 mL/min/1 73 square meters)  Note: GFR calculation is accurate only with a steady state creatinine   URINE MACROSCOPIC, POC - Abnormal    Color, UA Yellow   Final    Clarity, UA Clear   Final    pH, UA 5 5  4 5 - 8 0 Final    Leukocytes, UA Negative  Negative Final    Nitrite, UA Negative  Negative Final    Protein, UA Negative  Negative mg/dl Final    Glucose, UA Negative  Negative mg/dl Final    Ketones, UA Negative  Negative mg/dl Final    Urobilinogen, UA 1 0  0 2, 1 0 E U /dl E U /dl Final    Bilirubin, UA Interference- unable to analyze (*) Negative Final    Comment: The dipstick result may be falsely positive due to interfering substances  We recommend reliance upon serum bilirubin, liver & kidney function tests to guide patient care if clinically indicated  Blood, UA Negative  Negative Final    Specific Gravity, UA 1 025  1 003 - 1 030 Final    Narrative:     CLINITEK RESULT   POCT URINALYSIS DIPSTICK - Normal    Color, UA see results   Final   CBC AND DIFFERENTIAL     Time reflects when diagnosis was documented in both MDM as applicable and the Disposition within this note     Time User Action Codes Description Comment    12/8/2019  5:46 AM Be Valderrama Add [D68  Madelindorian Mckeons Fall, initial encounter     12/8/2019  5:46 AM Be Valderrama Add [R93 89] Abnormal chest CT     12/8/2019  5:47 AM Be Valderrama Add [E87 6] Hypokalemia       ED Disposition     ED Disposition Condition Date/Time Comment    Discharge Stable Sun Dec 8, 2019  [address] [place] [address] discharge to home/self care              Follow-up Information     Follow up With Specialties Details Why Contact Info Additional Information    Og Chávez MD Family Medicine Schedule an appointment as soon as possible for a visit   [address] [facility] 249 Pulmonology Schedule an appointment as soon as possible for a visit   [address] [facility] [address]  [address], [address] [place] [address], [address] [phone]        Patient's Medications   Discharge Prescriptions    No medications on file     No discharge procedures on file  Prior to Admission Medications   Prescriptions Last Dose Informant Patient Reported? Taking? PARoxetine (PAXIL) 10 mg tablet  Spouse/Significant Other Yes No   Sig: Take 10 mg by mouth daily   acetaminophen (TYLENOL) 325 mg tablet   No No   Sig: Take 2 tablets (650 mg total) by mouth every 6 (six) hours as needed for mild pain, headaches or fever   albuterol (2 5 mg/3 mL) 0 083 % nebulizer solution   No No   Sig: Take 1 vial (2 5 mg total) by nebulization every 6 (six) hours as needed for wheezing or shortness of breath   amLODIPine (NORVASC) 2 5 mg tablet  Spouse/Significant Other Yes No   Sig: Take 2 5 mg by mouth daily    amiodarone 200 mg tablet   No No   Sig: Take 1 tablet (200 mg total) by mouth 2 (two) times a day for 14 days, THEN 1 tablet (200 mg total) daily     amitriptyline (ELAVIL) 25 mg tablet   Yes No   Sig: Take 25 mg by mouth daily at bedtime   atorvastatin (LIPITOR) 40 mg tablet   No No   Sig: Take 1 tablet (40 mg total) by mouth every evening   dabigatran etexilate (PRADAXA) 150 mg capsu   No No   Sig: Take 1 capsule (150 mg total) by mouth every 12 (twelve) hours   metoprolol tartrate (LOPRESSOR) 25 mg tablet   No No   Sig: Take 1 tablet (25 mg total) by mouth 3 (three) times a day   polyethylene glycol (MIRALAX) 17 g packet   No No   Sig: Take 17 g by mouth daily as needed (constipation 1st line)   simvastatin (ZOCOR) 10 mg tablet   Yes No   Sig: Take 10 mg by mouth daily at bedtime      Facility-Administered Medications: None       Portions of the record may have been created with voice recognition software  Occasional wrong word or "sound a like" substitutions may have occurred due to the inherent limitations of voice recognition software  Read the chart carefully and recognize, using context, where substitutions have occurred      Electronically signed by:  Gera Siddiqi

## 2019-12-08 NOTE — ED NOTES
Patient incontinent of stool  Patient cleaned and barrier cream applied to skin breakdown    Patient repositioned in bed     Charisma Qiu RN  12/08/19 9121

## 2019-12-08 NOTE — ED PROVIDER NOTES
History  Chief Complaint   Patient presents with    Fall     per ems - patient resides at [facility], fall OOB  Denies headstrike, - thinners  Patient has no complaints     80 YOF with history of afib, asthma, GERD who presents from [facility] after fall  Pt states she remembers falling out of her chair because she was leaning forward to look out the window and leaned too far losing her balance  She is unsure what part of her body she fell on but reports she thinks she hit her head  She denies any pain or other complaints at this time  Prior to Admission Medications   Prescriptions Last Dose Informant Patient Reported? Taking? PARoxetine (PAXIL) 10 mg tablet  Spouse/Significant Other Yes No   Sig: Take 10 mg by mouth daily   acetaminophen (TYLENOL) 325 mg tablet   No No   Sig: Take 2 tablets (650 mg total) by mouth every 6 (six) hours as needed for mild pain, headaches or fever   albuterol (2 5 mg/3 mL) 0 083 % nebulizer solution   No No   Sig: Take 1 vial (2 5 mg total) by nebulization every 6 (six) hours as needed for wheezing or shortness of breath   amLODIPine (NORVASC) 2 5 mg tablet  Spouse/Significant Other Yes No   Sig: Take 2 5 mg by mouth daily    amiodarone 200 mg tablet   No No   Sig: Take 1 tablet (200 mg total) by mouth 2 (two) times a day for 14 days, THEN 1 tablet (200 mg total) daily     amitriptyline (ELAVIL) 25 mg tablet   Yes No   Sig: Take 25 mg by mouth daily at bedtime   atorvastatin (LIPITOR) 40 mg tablet   No No   Sig: Take 1 tablet (40 mg total) by mouth every evening   dabigatran etexilate (PRADAXA) 150 mg capsu   No No   Sig: Take 1 capsule (150 mg total) by mouth every 12 (twelve) hours   metoprolol tartrate (LOPRESSOR) 25 mg tablet   No No   Sig: Take 1 tablet (25 mg total) by mouth 3 (three) times a day   polyethylene glycol (MIRALAX) 17 g packet   No No   Sig: Take 17 g by mouth daily as needed (constipation 1st line)   simvastatin (ZOCOR) 10 mg tablet   Yes No   Sig: Take 10 mg by mouth daily at bedtime      Facility-Administered Medications: None       Past Medical History:   Diagnosis Date    Asthma     Atrial fibrillation (HCC)     Fibromyalgia     GERD (gastroesophageal reflux disease)     H/O emphysema     Hypercholesterolemia        Past Surgical History:   Procedure Laterality Date    CHOLECYSTECTOMY      HYSTERECTOMY      IR STROKE ALERT  9/19/2019       Family History   Problem Relation Age of Onset    Neuropathy Other      I have reviewed and agree with the history as documented  Social History     Tobacco Use    Smoking status: Never Smoker    Smokeless tobacco: Never Used   Substance Use Topics    Alcohol use: Not Currently    Drug use: Never        Review of Systems   Constitutional: Negative  HENT: Negative for dental problem, drooling, ear discharge, facial swelling and trouble swallowing  Eyes: Negative for pain and visual disturbance  Respiratory: Negative for chest tightness and shortness of breath  Cardiovascular: Negative for chest pain and leg swelling  Gastrointestinal: Negative for abdominal pain, nausea and vomiting  Genitourinary: Negative for flank pain and pelvic pain  Musculoskeletal: Negative for back pain, gait problem, joint swelling and neck pain  Skin: Negative for color change and wound  Neurological: Negative for dizziness, syncope, weakness, numbness and headaches  All other systems reviewed and are negative        Physical Exam  ED Triage Vitals   Temperature Pulse Respirations Blood Pressure SpO2   12/08/19 0110 12/08/19 0110 12/08/19 0110 12/08/19 0110 12/08/19 0110   98 7 °F (37 1 °C) 67 20 160/70 92 %      Temp Source Heart Rate Source Patient Position - Orthostatic VS BP Location FiO2 (%)   12/08/19 0110 12/08/19 0110 12/08/19 0254 12/08/19 0254 --   Oral Monitor Lying Right arm       Pain Score       12/08/19 0110       No Pain             Orthostatic Vital Signs  Vitals:    12/08/19 0110 12/08/19 0254 12/08/19 0455   BP: 160/70 165/72 157/65   Pulse: 67 68 63   Patient Position - Orthostatic VS:  Lying        Physical Exam   Constitutional: She appears well-developed and well-nourished  No distress  HENT:   Head: Normocephalic and atraumatic  Right Ear: External ear normal    Left Ear: External ear normal    Nose: Nose normal    Eyes: Pupils are equal, round, and reactive to light  EOM are normal    Neck: Normal range of motion  Neck supple  No tracheal deviation present  Cardiovascular: Normal rate and regular rhythm  Exam reveals no gallop and no friction rub  No murmur heard  Pulmonary/Chest: Effort normal and breath sounds normal  She has no wheezes  She has no rales  She exhibits no tenderness  Abdominal: Soft  Bowel sounds are normal  She exhibits no distension  There is no tenderness  Musculoskeletal: Normal range of motion  She exhibits tenderness (along the left humerus)  She exhibits no deformity  Neurological: She is alert  No sensory deficit  She exhibits normal muscle tone  Coordination normal    Oriented to person and place but not time  Skin: Skin is warm and dry  5cm x 5cm area of ecchymosis on the left humerus  Multiple other small areas of ecchymosis along the left arm along with several small abrasions  Psychiatric: She has a normal mood and affect  Her behavior is normal    Nursing note and vitals reviewed        ED Medications  Medications   potassium chloride (K-DUR,KLOR-CON) CR tablet 40 mEq (40 mEq Oral Given 12/8/19 0553)       Diagnostic Studies  Results Reviewed     Procedure Component Value Units Date/Time    CBC and differential [[id number]]  (Abnormal) Collected:  12/08/19 0455    Lab Status:  Final result Specimen:  Blood from Arm, Left Updated:  12/08/19 0714     WBC 15 21 Thousand/uL      RBC 5 63 Million/uL      Hemoglobin 13 7 g/dL      Hematocrit 44 5 %      MCV 79 fL      MCH 24 3 pg      MCHC 30 8 g/dL      RDW 17 2 %      MPV 9 6 fL      Platelets 817 Thousands/uL nRBC 0 /100 WBCs      Neutrophils Relative 66 %      Immat GRANS % 0 %      Lymphocytes Relative 20 %      Monocytes Relative 10 %      Eosinophils Relative 3 %      Basophils Relative 1 %      Neutrophils Absolute 10 06 Thousands/µL      Immature Grans Absolute 0 05 Thousand/uL      Lymphocytes Absolute 3 04 Thousands/µL      Monocytes Absolute 1 52 Thousand/µL      Eosinophils Absolute 0 40 Thousand/µL      Basophils Absolute 0 14 Thousands/µL     POCT urinalysis dipstick [[id number]]  (Normal) Resulted:  12/08/19 0545    Lab Status:  Final result Specimen:  Urine Updated:  12/08/19 0545     Color, UA see results    Basic metabolic panel [[id number]]  (Abnormal) Collected:  12/08/19 0455    Lab Status:  Final result Specimen:  Blood from Arm, Left Updated:  12/08/19 0522     Sodium 141 mmol/L      Potassium 3 2 mmol/L      Chloride 108 mmol/L      CO2 26 mmol/L      ANION GAP 7 mmol/L      BUN 17 mg/dL      Creatinine 0 88 mg/dL      Glucose 93 mg/dL      Calcium 8 9 mg/dL      eGFR 60 ml/min/1 73sq m     Narrative:       [facility] guidelines for Chronic Kidney Disease (CKD):     Stage 1 with normal or high GFR (GFR > 90 mL/min/1 73 square meters)    Stage 2 Mild CKD (GFR = 60-89 mL/min/1 73 square meters)    Stage 3A Moderate CKD (GFR = 45-59 mL/min/1 73 square meters)    Stage 3B Moderate CKD (GFR = 30-44 mL/min/1 73 square meters)    Stage 4 Severe CKD (GFR = 15-29 mL/min/1 73 square meters)    Stage 5 End Stage CKD (GFR <15 mL/min/1 73 square meters)  Note: GFR calculation is accurate only with a steady state creatinine    Urine Macroscopic, POC [[id number]]  (Abnormal) Collected:  12/08/19 0420    Lab Status:  Final result Specimen:  Urine Updated:  12/08/19 0420     Color, UA Yellow     Clarity, UA Clear     pH, UA 5 5     Leukocytes, UA Negative     Nitrite, UA Negative     Protein, UA Negative mg/dl      Glucose, UA Negative mg/dl      Ketones, UA Negative mg/dl Urobilinogen, UA 1 0 E U /dl      Bilirubin, UA Interference- unable to analyze     Blood, UA Negative     Specific Gravity, UA 1 025    Narrative:       CLINITEK RESULT                 CT chest without contrast   ED Interpretation by Dee Mancuso MD (12/08 2729)   Ground glass appearance in RLL? No large effusion  No hemothorax  Awaiting formal read  Final Result by Goldie Groves MD (12/08 7743)      No evidence of acute traumatic injury throughout the chest       Bilateral areas of bronchiectasis, peripheral tree-in-bud pattern with micronodularity, and mosaic attenuation suggesting chronic large and small airways disease with peripheral mucoid impaction  Workstation performed: [id number]         XR chest 2 views   ED Interpretation by Dee Mancuso MD (12/08 1874)   RLL pneumonia  Given fall, the meniscus could be small amount of blood? LLL effusion   Atelectasis   Cephalization at top, more on the right   Deviation of hte trachea      Final Result by Alfred Lewis MD (12/08 0166)      No acute cardiopulmonary disease  Workstation performed: [id number]         CT head without contrast   Final Result by Maryana Palacios DO (12/08 4681)      No acute intracranial abnormality  Workstation performed: [id number]         CT cervical spine without contrast   Final Result by Maryana Palacios DO (12/08 7624)      No cervical spine fracture or traumatic malalignment  Airspace opacities in the lung apices which may represent pulmonary edema                   Workstation performed: [id number]         XR humerus LEFT   ED Interpretation by Dee Mancuso MD (12/08 0335)   No fracture      Final Result by Renea Caldwell MD (12/08 1313)      No acute osseous abnormality        Workstation performed: [id number]               Procedures  Procedures      ED Course           Identification of Seniors at Risk      Most Recent Value   (ISAR) Identification of Seniors at Risk   Before the illness or injury that brought you to the Emergency, did you need someone to help you on a regular basis? 1 Filed at: 12/08/2019 0114   In the last 24 hours, have you needed more help than usual?  1 Filed at: 12/08/2019 4557   Have you been hospitalized for one or more nights during the past 6 months? 1 Filed at: 12/08/2019 0114   In general, do you see well? 1 Filed at: 12/08/2019 0114   In general, do you have serious problems with your memory? 1 Filed at: 12/08/2019 0114   Do you take more than three different medications every day? 1 Filed at: 12/08/2019 0114   ISAR Score  6 Filed at: 12/08/2019 0114                          MDM  Number of Diagnoses or Management Options  Abnormal chest CT: new and requires workup  Fall, initial encounter: new and requires workup  Hypokalemia: new and requires workup  Diagnosis management comments: 80 YOF who presents after fall  Plan to obtain CT head and neck given patient's age along with XR L humerus due to signs of trauma  CT head and L humerus XR showed no acute abnormality  CT neck showed no traumatic injury but signs of possible pulmonary edema  Subsequent chest XR was abnormal showing effusions vs pneumonia vs hemothorax  Chest CT was ordered for further evaluation which showed bronchiectasis and chronic airway disease  No acute intervention necessary at this time  Pt otherwise stable  K repleted  Discharged back to [facility] with return precautions and follow up with PCP and pulmonology for abnormal chest CT         Amount and/or Complexity of Data Reviewed  Clinical lab tests: ordered and reviewed  Tests in the radiology section of CPT®: ordered and reviewed  Decide to obtain previous medical records or to obtain history from someone other than the patient: yes  Review and summarize past medical records: yes  Discuss the patient with other providers: yes    Risk of Complications, Morbidity, and/or Mortality  Presenting problems: low  Management options: low    Patient Progress  Patient progress: stable        Disposition  Final diagnoses:   Fall, initial encounter   Abnormal chest CT   Hypokalemia     Time reflects when diagnosis was documented in both MDM as applicable and the Disposition within this note     Time User Action Codes Description Comment    12/8/2019  5:46 AM Lior Player Add [K74  Rob Cox Fall, initial encounter     12/8/2019  5:46 AM Lior Player Add [[id number]] Abnormal chest CT     12/8/2019  5:47 AM Lior Player Add [E87 [id number]] Hypokalemia       ED Disposition     ED Disposition Condition Date/Time Comment    Discharge Stable Sun Dec 8, 2019  [address] [place] [address] discharge to home/self care              Follow-up Information     Follow up With Specialties Details Why Contact Info Additional Information    Merlin Bong, MD Family Medicine Schedule an appointment as soon as possible for a visit   [address] [facility] 249 Pulmonology Schedule an appointment as soon as possible for a visit   [address] [facility] [id number]  [address], [id number] [phone]          Discharge Medication List as of 12/8/2019  5:50 AM      CONTINUE these medications which have NOT CHANGED    Details   acetaminophen (TYLENOL) 325 mg tablet Take 2 tablets (650 mg total) by mouth every 6 (six) hours as needed for mild pain, headaches or fever, Starting Wed 9/25/2019, No Print      albuterol (2 5 mg/3 mL) 0 083 % nebulizer solution Take 1 vial (2 5 mg total) by nebulization every 6 (six) hours as needed for wheezing or shortness of breath, Starting Wed 9/25/2019, No Print      amiodarone 200 mg tablet Multiple Dosages:Starting Wed 9/25/2019, Last dose on Tue 10/8/2019, THEN Starting Wed 10/9/2019, Last dose on Thu 11/7/2019Take 1 tablet (200 mg total) by mouth 2 (two) times a day for 14 days, THEN 1 tablet (200 mg total) daily  , No Print      amitriptyline (ELAVIL) 25 mg tablet Take 25 mg by mouth daily at bedtime, Historical Med      amLODIPine (NORVASC) 2 5 mg tablet Take 2 5 mg by mouth daily , Starting Tue 2/19/2019, Historical Med      atorvastatin (LIPITOR) 40 mg tablet Take 1 tablet (40 mg total) by mouth every evening, Starting Wed 9/25/2019, No Print      dabigatran etexilate (PRADAXA) 150 mg capsu Take 1 capsule (150 mg total) by mouth every 12 (twelve) hours, Starting Wed 9/25/2019, No Print      metoprolol tartrate (LOPRESSOR) 25 mg tablet Take 1 tablet (25 mg total) by mouth 3 (three) times a day, Starting Wed 9/25/2019, No Print      PARoxetine (PAXIL) 10 mg tablet Take 10 mg by mouth daily, Historical Med      polyethylene glycol (MIRALAX) 17 g packet Take 17 g by mouth daily as needed (constipation 1st line), Starting Wed 9/25/2019, No Print      simvastatin (ZOCOR) 10 mg tablet Take 10 mg by mouth daily at bedtime, Historical Med           No discharge procedures on file  ED Provider  Attending physically available and evaluated Ángel Segovia  I managed the patient along with the ED Attending      Electronically Signed by         Baljit George MD  12/08/19 2307

## 2019-12-08 NOTE — DISCHARGE INSTRUCTIONS
You came to the emergency department for evaluation after a fall  We performed CT scans of your head and neck and an xray of your left arm  These showed no acute traumatic injuries  There was some abnormality noted in the lungs on your neck CT and we subsequently got a CT scan of your chest as well which showed "bilateral areas of bronchiectasis, peripheral tree-in-bud pattern with micronodularity, and mosaic attenuation suggesting chronic large and small airways disease with peripheral mucoid impaction " Your lab work only showed low potassium levels which we gave you medication for  Your urine testing was negative for UTI  Please follow up with your primary care doctor on Monday for re-evaluation  Follow up with pulmonology as well for your abnormal CT scan  Please return to the emergency department if you develop difficulty breathing, chest pain, or anything else concerning to you

## 2019-12-18 ENCOUNTER — OFFICE VISIT (OUTPATIENT)
Dept: PULMONOLOGY | Facility: CLINIC | Age: 84
End: 2019-12-18
Payer: MEDICARE

## 2019-12-18 VITALS
HEIGHT: 63 IN | RESPIRATION RATE: 16 BRPM | WEIGHT: 158 LBS | SYSTOLIC BLOOD PRESSURE: 114 MMHG | OXYGEN SATURATION: 95 % | BODY MASS INDEX: 28 KG/M2 | TEMPERATURE: 96.9 F | HEART RATE: 56 BPM | DIASTOLIC BLOOD PRESSURE: 60 MMHG

## 2019-12-18 DIAGNOSIS — R91.8 ABNORMAL CT SCAN OF LUNG: Primary | ICD-10-CM

## 2019-12-18 PROCEDURE — 99215 OFFICE O/P EST HI 40 MIN: CPT | Performed by: INTERNAL MEDICINE

## 2019-12-18 NOTE — PROGRESS NOTES
Pulmonary Consultation   Joaquín Gamboa 80 y o  female MRN: [id number]  12/18/2019      Assessment:    Abnormal lung CT scan- possibly multifactorial in the setting of chronic aspiration with long-term environmental exposure in new onset amiodarone use  - the options for management including a repeat CT scan of the lungs in about 8 weeks verses a bronchoscopy with bronchoalveolar lavage-   - attempted to contact her daughter Sonu Holguin who is also her power of  [phone] in order to present the options  No answer and therefore a voicemail was left  Will follow up for further discussions regarding plan    Bibasilar dense opacification with bronchiectasis-greater on the right  Likely from chronic aspiration although cannot rule out chronic hypersensitivity pneumonitis versus amiodarone use  - recommend speech and swallow evaluation at her nursing home with adjustment of diet if necessary    Biapical mosaic pattern/ground-glass opacification- unclear etiology  Possible from long-term chronic exposure to an unidentified trigger versus amiodarone toxicity versus less likely artifact from phases of respiration    Hx of asthma- on albuterol nebs daily    Paroxysmal Afib- on metoprolol and amio    Plan:    Diagnoses and all orders for this visit:    Abnormal CT scan of lung        History of Present Illness   HPI:  Joaquín Gamboa is a 80 y o  female With a past medical history of asthma, AFib initiated on amiodarone in September of 2019 that presents for follow-up of an abnormal CT scan of the lungs  The patient was recently hospitalized for a full and at that time a CT scan of the lungs was obtained that showed bilateral ground-glass opacification on the right greater than and therefore the patient is presented today for follow-up  The patient has baseline dementia and so is unable to recall an answer many of the questions    She mentions having occasional shortness of breath and a productive cough of whitish sputum  She is unsure as far as her smoking status  Patient resides at [facility]  She does mention that occasionally she experiences trouble swallowing which triggers her to cough  Discussed w/ nursing home, patient is not noted to be SOB or cough  Wheelchair bound  Is on a modified solid diet  Received daily neb for asthma  Review of Systems   Constitutional: Negative for chills, diaphoresis, fatigue and fever  HENT: Negative for congestion, postnasal drip, rhinorrhea, sneezing and trouble swallowing  Eyes: Negative for redness and itching  Respiratory: Positive for cough and shortness of breath  Negative for apnea, choking, chest tightness, wheezing and stridor  Cardiovascular: Negative for chest pain, palpitations and leg swelling  Gastrointestinal: Negative for abdominal distention, abdominal pain, blood in stool, constipation, diarrhea, nausea and vomiting  Endocrine: Negative for polydipsia and polyuria  Genitourinary: Negative for dysuria and flank pain  Musculoskeletal: Negative for arthralgias, back pain and joint swelling  Skin: Negative for pallor and rash  Neurological: Negative for dizziness, syncope, weakness, light-headedness, numbness and headaches  Hematological: Negative for adenopathy  Psychiatric/Behavioral: Negative for agitation  Historical Information   Past Medical History:   Diagnosis Date    Asthma     Atrial fibrillation (HCC)     Fibromyalgia     GERD (gastroesophageal reflux disease)     H/O emphysema     Hypercholesterolemia      Past Surgical History:   Procedure Laterality Date    CHOLECYSTECTOMY      HYSTERECTOMY      IR STROKE ALERT  9/19/2019     Family History   Problem Relation Age of Onset    Neuropathy Other        Occupational History:  Unable to obtain due to baseline dementia    Social History:  No reported history of smoking per chart, no drug use or alcohol  Resides at a nursing home     also resides at the same facility    Meds/Allergies     Current Outpatient Medications:     acetaminophen (TYLENOL) 325 mg tablet, Take 2 tablets (650 mg total) by mouth every 6 (six) hours as needed for mild pain, headaches or fever, Disp: 30 tablet, Rfl: 0    albuterol (2 5 mg/3 mL) 0 083 % nebulizer solution, Take 1 vial (2 5 mg total) by nebulization every 6 (six) hours as needed for wheezing or shortness of breath, Disp: , Rfl: 0    amitriptyline (ELAVIL) 25 mg tablet, Take 25 mg by mouth daily at bedtime, Disp: , Rfl:     amLODIPine (NORVASC) 2 5 mg tablet, Take 2 5 mg by mouth daily , Disp: , Rfl: 3    atorvastatin (LIPITOR) 40 mg tablet, Take 1 tablet (40 mg total) by mouth every evening, Disp: , Rfl: 0    dabigatran etexilate (PRADAXA) 150 mg capsu, Take 1 capsule (150 mg total) by mouth every 12 (twelve) hours, Disp: , Rfl: 0    metoprolol tartrate (LOPRESSOR) 25 mg tablet, Take 1 tablet (25 mg total) by mouth 3 (three) times a day, Disp: , Rfl: 0    PARoxetine (PAXIL) 10 mg tablet, Take 10 mg by mouth daily, Disp: , Rfl:     polyethylene glycol (MIRALAX) 17 g packet, Take 17 g by mouth daily as needed (constipation 1st line), Disp: 14 each, Rfl: 0    simvastatin (ZOCOR) 10 mg tablet, Take 10 mg by mouth daily at bedtime, Disp: , Rfl:     amiodarone 200 mg tablet, Take 1 tablet (200 mg total) by mouth 2 (two) times a day for 14 days, THEN 1 tablet (200 mg total) daily  , Disp: , Rfl: 0  Allergies   Allergen Reactions    Amoxicillin     Bactrim [Sulfamethoxazole-Trimethoprim]     Carvedilol     Ciprofloxacin     Cymbalta [Duloxetine Hcl]     Doxycycline     Dyazide [Hydrochlorothiazide W-Triamterene]     Gabapentin     Metoprolol     Simvastatin     Zoloft [Sertraline]     Penicillins Itching     However has tolerated Cephalexin and Ceftriaxone       Vitals: Blood pressure 114/60, pulse 56, temperature (!) 96 9 °F (36 1 °C), temperature source Tympanic, resp   rate 16, height 5' 3" (1 6 m), weight 71 7 kg (158 lb), SpO2 95 %  , Body mass index is 27 99 kg/m²  Oxygen Therapy  SpO2: 95 %    Physical Exam  Physical Exam   Constitutional: No distress  HENT:   Head: Normocephalic and atraumatic  Nose: Nose normal    Mouth/Throat: Oropharynx is clear and moist  No oropharyngeal exudate  Eyes: Pupils are equal, round, and reactive to light  Conjunctivae and EOM are normal  No scleral icterus  Neck: Normal range of motion  Neck supple  No JVD present  No tracheal deviation present  No thyromegaly present  Cardiovascular: Normal rate, regular rhythm, normal heart sounds and intact distal pulses  Exam reveals no gallop and no friction rub  No murmur heard  Pulmonary/Chest: Effort normal  No stridor  No respiratory distress  She has no wheezes  She has no rales  Bibasilar crackles   Abdominal: Soft  Bowel sounds are normal  She exhibits no distension  There is no tenderness  There is no rebound and no guarding  Musculoskeletal: Normal range of motion  She exhibits no edema or deformity  Lymphadenopathy:     She has no cervical adenopathy  Neurological: She is alert  No cranial nerve deficit or sensory deficit  Oriented towards person and time   Skin: Skin is warm  No rash noted  She is not diaphoretic  No erythema  Psychiatric: She has a normal mood and affect  Labs: I have personally reviewed pertinent lab results  Lab Results   Component Value Date    WBC 15 21 (H) 12/08/2019    HGB 13 7 12/08/2019    HCT 44 5 12/08/2019    MCV 79 (L) 12/08/2019     (H) 12/08/2019     Lab Results   Component Value Date    GLUCOSE 136 09/19/2019    CALCIUM 8 9 12/08/2019    K 3 2 (L) 12/08/2019    CO2 26 12/08/2019     12/08/2019    BUN 17 12/08/2019    CREATININE 0 88 12/08/2019     No results found for: IGE  No results found for: ALT, AST, GGT, ALKPHOS, BILITOT      Imaging and other studies: I have personally reviewed pertinent reports     and I have personally reviewed pertinent films in PACS  CT chest-bibasilar linear opacification and ground-glass densities on the right greater than left with evidence of bronchiectasis  Biapical ground-glass in mosaic pattern  Pulmonary function testing:  None on file    EKG, Pathology, and Other Studies: I have personally reviewed pertinent reports  and I have personally reviewed pertinent films in PACS  Cardiac echo on September 19, 2019-left ventricular ejection fraction of 65% with grade 1 diastolic dysfunction  Normal LV and RV function  Mildly dilated left atrium  Mild aortic valve regurg    No evidence of pulmonary hypertension    Britney Galan MD  Pulmonary and Critical Care Fellow- PGY 4  [facility]

## 2019-12-23 ENCOUNTER — TELEPHONE (OUTPATIENT)
Dept: PULMONOLOGY | Facility: CLINIC | Age: 84
End: 2019-12-23

## 2019-12-23 NOTE — TELEPHONE ENCOUNTER
Patients daughter Astrid Simeon returning your call from Friday regarding her mother Fiona Benitez  Please call back at [phone]

## 2020-02-26 DIAGNOSIS — R93.89 ABNORMAL CT OF THE CHEST: Primary | ICD-10-CM

## 2020-02-26 NOTE — PROGRESS NOTES
Called daughter Agusto Ballesteros and discussed options for abnormal CT chest including repeat since it has been almost 3 months vs bronch with BAL  She opted for repeat CT chest at this time  Discussed risks and benefits with repeat imaging vs bronchoscopy  Will place order for CT chest w/o contrast  All questions answered and she verbalized understanding of the plan moving forward

## 2020-04-14 RX ORDER — FLUTICASONE FUROATE AND VILANTEROL TRIFENATATE 100; 25 UG/1; UG/1
POWDER RESPIRATORY (INHALATION)
COMMUNITY
Start: 2020-02-20

## 2020-04-14 RX ORDER — LISINOPRIL 5 MG/1
TABLET ORAL
COMMUNITY
Start: 2020-02-19

## 2020-04-15 ENCOUNTER — DOCUMENTATION (OUTPATIENT)
Dept: PULMONOLOGY | Facility: CLINIC | Age: 85
End: 2020-04-15

## 2020-04-15 ENCOUNTER — TELEMEDICINE (OUTPATIENT)
Dept: PULMONOLOGY | Facility: CLINIC | Age: 85
End: 2020-04-15
Payer: MEDICARE

## 2020-04-15 VITALS
SYSTOLIC BLOOD PRESSURE: 132 MMHG | HEIGHT: 65 IN | DIASTOLIC BLOOD PRESSURE: 76 MMHG | BODY MASS INDEX: 23.32 KG/M2 | WEIGHT: 140 LBS

## 2020-04-15 DIAGNOSIS — R91.8 ABNORMAL CT SCAN OF LUNG: Primary | ICD-10-CM

## 2020-04-15 PROCEDURE — 99213 OFFICE O/P EST LOW 20 MIN: CPT | Performed by: PHYSICIAN ASSISTANT

## 2020-06-18 ENCOUNTER — APPOINTMENT (OUTPATIENT)
Dept: WOUND CARE | Facility: HOSPITAL | Age: 85
End: 2020-06-18
Payer: MEDICARE

## 2020-06-18 PROCEDURE — 97597 DBRDMT OPN WND 1ST 20 CM/<: CPT

## 2020-06-18 PROCEDURE — 11042 DBRDMT SUBQ TIS 1ST 20SQCM/<: CPT

## 2020-06-18 PROCEDURE — 99213 OFFICE O/P EST LOW 20 MIN: CPT

## 2020-07-02 ENCOUNTER — APPOINTMENT (OUTPATIENT)
Dept: WOUND CARE | Facility: HOSPITAL | Age: 85
End: 2020-07-02
Payer: MEDICARE

## 2020-07-02 PROCEDURE — 97597 DBRDMT OPN WND 1ST 20 CM/<: CPT

## 2020-07-02 PROCEDURE — 11042 DBRDMT SUBQ TIS 1ST 20SQCM/<: CPT

## 2020-07-15 ENCOUNTER — HOSPITAL ENCOUNTER (EMERGENCY)
Facility: HOSPITAL | Age: 85
Discharge: HOME | End: 2020-07-15
Attending: EMERGENCY MEDICINE | Admitting: EMERGENCY MEDICINE
Payer: MEDICARE

## 2020-07-15 VITALS
WEIGHT: 140 LBS | BODY MASS INDEX: 23.3 KG/M2 | OXYGEN SATURATION: 94 % | HEART RATE: 77 BPM | SYSTOLIC BLOOD PRESSURE: 130 MMHG | DIASTOLIC BLOOD PRESSURE: 63 MMHG | TEMPERATURE: 98.3 F | RESPIRATION RATE: 18 BRPM

## 2020-07-15 DIAGNOSIS — Z51.89 VISIT FOR WOUND CHECK: ICD-10-CM

## 2020-07-15 DIAGNOSIS — U07.1 REAL TIME REVERSE TRANSCRIPTASE PCR POSITIVE FOR COVID-19 VIRUS: ICD-10-CM

## 2020-07-15 DIAGNOSIS — S31.000A WOUND OF SACRAL REGION, INITIAL ENCOUNTER: Primary | ICD-10-CM

## 2020-07-15 PROCEDURE — 99284 EMERGENCY DEPT VISIT MOD MDM: CPT | Performed by: EMERGENCY MEDICINE

## 2020-07-15 PROCEDURE — 99284 EMERGENCY DEPT VISIT MOD MDM: CPT

## 2020-07-15 NOTE — ED NOTES
Multiple attempts made to [facility]   Lisa Kong The line remains busy    Will continue to call      Radha Skinner RN  07/15/20 4773

## 2020-07-16 NOTE — ED PROVIDER NOTES
History  Chief Complaint   Patient presents with    Wound Check     Sent for evaluation of sacral wound from [facility], pt dx as covid + yesterday      77-year-old female sent from nursing facility for evaluation sacral wound, gradual onset, has been worsening, unclear time of onset, also has associated some a wound on upper back  Patient comfortable, denies pain  Recently tested positive for covid, asymptomatic with no dyspnea  Has been afebrile with no rigors or other systemic symptoms  Is receiving wound care at the facility  After arrival in the emergency department, received call from call from hospice team indicating that patient is actually a hospice patient  Prior to Admission Medications   Prescriptions Last Dose Informant Patient Reported? Taking? BREO ELLIPTA 100-25 MCG/INH inhaler   Yes No   Melatonin 5 MG CAPS   Yes No   Sig: Take 5 mg by mouth   PARoxetine (PAXIL) 10 mg tablet  Spouse/Significant Other Yes No   Sig: Take 10 mg by mouth daily   acetaminophen (TYLENOL) 325 mg tablet   No No   Sig: Take 2 tablets (650 mg total) by mouth every 6 (six) hours as needed for mild pain, headaches or fever   albuterol (2 5 mg/3 mL) 0 083 % nebulizer solution   No No   Sig: Take 1 vial (2 5 mg total) by nebulization every 6 (six) hours as needed for wheezing or shortness of breath   amLODIPine (NORVASC) 2 5 mg tablet  Spouse/Significant Other Yes No   Sig: Take 2 5 mg by mouth daily    amiodarone 200 mg tablet   No No   Sig: Take 1 tablet (200 mg total) by mouth 2 (two) times a day for 14 days, THEN 1 tablet (200 mg total) daily     amitriptyline (ELAVIL) 25 mg tablet   Yes No   Sig: Take 25 mg by mouth daily at bedtime   atorvastatin (LIPITOR) 40 mg tablet   No No   Sig: Take 1 tablet (40 mg total) by mouth every evening   dabigatran etexilate (PRADAXA) 150 mg capsu   No No   Sig: Take 1 capsule (150 mg total) by mouth every 12 (twelve) hours   lisinopril (ZESTRIL) 5 mg tablet   Yes No   metoprolol tartrate (LOPRESSOR) 25 mg tablet   No No   Sig: Take 1 tablet (25 mg total) by mouth 3 (three) times a day   polyethylene glycol (MIRALAX) 17 g packet   No No   Sig: Take 17 g by mouth daily as needed (constipation 1st line)   simvastatin (ZOCOR) 10 mg tablet   Yes No   Sig: Take 10 mg by mouth daily at bedtime      Facility-Administered Medications: None       Past Medical History:   Diagnosis Date    Asthma     Atrial fibrillation (HCC)     Fibromyalgia     GERD (gastroesophageal reflux disease)     H/O emphysema     Hypercholesterolemia        Past Surgical History:   Procedure Laterality Date    CHOLECYSTECTOMY      HYSTERECTOMY      IR STROKE ALERT  9/19/2019       Family History   Problem Relation Age of Onset    Neuropathy Other      I have reviewed and agree with the history as documented  E-Cigarette/Vaping     E-Cigarette/Vaping Substances     Social History     Tobacco Use    Smoking status: Never Smoker    Smokeless tobacco: Never Used   Substance Use Topics    Alcohol use: Not Currently    Drug use: Never        Review of Systems   Unable to perform ROS: Dementia       Physical Exam  ED Triage Vitals [07/15/20 1506]   Temperature Pulse Respirations Blood Pressure SpO2   98 3 °F (36 8 °C) 78 20 104/54 95 %      Temp Source Heart Rate Source Patient Position - Orthostatic VS BP Location FiO2 (%)   Oral Monitor Lying Right arm --      Pain Score       --             Orthostatic Vital Signs  Vitals:    07/15/20 1506 07/15/20 1845   BP: 104/54 130/63   Pulse: 78 77   Patient Position - Orthostatic VS: Lying Lying       Physical Exam   Constitutional: She appears well-developed  No distress  HENT:   Head: Normocephalic  Right Ear: External ear normal    Left Ear: External ear normal    Nose: Nose normal    Mouth/Throat: Oropharynx is clear and moist    Eyes: Conjunctivae and EOM are normal    Neck: No tracheal deviation present     Cardiovascular: Normal rate, normal heart sounds and intact distal pulses  Pulmonary/Chest: Effort normal and breath sounds normal  No stridor  No respiratory distress  She has no wheezes  She has no rales  She exhibits no tenderness  Abdominal: Soft  She exhibits no distension  There is no tenderness  There is no rebound and no guarding  Musculoskeletal: She exhibits no tenderness or deformity  Neurological: She is alert  No cranial nerve deficit or sensory deficit  She exhibits normal muscle tone  Skin: Skin is warm and dry  Capillary refill takes less than 2 seconds  She is not diaphoretic  Approximately 4 cm sacral and upper back ulcers in the midline, some surrounding erythema, no gross purulence, discharge, or induration  Psychiatric: Her behavior is normal    Nursing note and vitals reviewed  ED Medications  Medications - No data to display    Diagnostic Studies  Results Reviewed     None                 No orders to display         Procedures  Procedures      ED Course  ED Course as of Jul 16 1334   Wed Jul 15, 2020   1540 Hospice called to receive update on pt  They will talk to family and call back regarding if pt wishes for further intervention  12 Discussed with daughter regarding goals of care, she states that she and her father wish for patient to continue to receive hospice care, she states that their primary goal is to ensure that patient is comfortable  They do not wish for aggressive interventions or to have patient to the hospital   At this time will plan to discharge patient home  I called [facility] to update them on the wishes of the family  They state that patient will continue to receive all care at the facility  Patient resting comfortably at this time  Will discharge patient back to home nursing facility                                                MDM  Number of Diagnoses or Management Options  Real time reverse transcriptase PCR positive for COVID-19 virus:   Visit for wound check:   Wound of sacral region, initial encounter:   Diagnosis management comments: This is an 80-year-old female who presents for wound evaluation  Patient coming from nursing facility, recently tested positive for COVID-19  Patient has not had any respiratory symptoms, has also been afebrile with no other systemic symptoms  On my exam patient is nontoxic, stable vitals, afebrile, she does have 2 wounds that are in the sacral and upper back regions both are slightly erythematous, no gross purulence  Shortly after evaluation received call from hospice team reported the patient's affect hospice patient, was not informed of this by the facility  I personally called at the daughter who is patient's primary contact, she assist her father in making decisions regarding patient's care  The daughter states that the family's wishes is that patient received care that would make her more comfortable and wished to continue with hospice care  She states that she would not want any aggressive interventions  Patient not in discomfort at this time  No gross evidence of infection and further patient is a hospice patient so will hold on antibiotics  Patient is noted be covid positive, she is not displaying any respiratory symptoms at bedtime and a nursing facility reports they do have a isolation room for her  Patient is to receive wound care visit at nursing facility in 2 days  And agreement with daughters wishes will plan to discharge patient to home  Is reasonable to continue with wound care visits at the facility and without further escalation of care at this time  I personally called the nursing facility to update them on I findings in the emergency department and my discussion with the daughter          Disposition  Final diagnoses:   Visit for wound check   Wound of sacral region, initial encounter   Real time reverse transcriptase PCR positive for COVID-19 virus     Time reflects when diagnosis was documented in both MDM as applicable and the Disposition within this note     Time User Action Codes Description Comment    7/15/2020  4:52 PM Schmeitzel, Charm Lefort Add [Z51 89] Visit for wound check     7/15/2020  4:52 PM Schmeitzel, Charm Lefort Add [Y36 964O] Wound of sacral region, initial encounter     7/15/2020  4:52 PM Schmeitzel, Charm Lefort Modify [Z51 89] Visit for wound check     7/15/2020  4:52 PM Schmeitzel, Charm Lefort Modify [S31 000A] Wound of sacral region, initial encounter     7/15/2020  4:52 PM Schmeitzel, Charm Lefort Add [U07 1] Real time reverse transcriptase PCR positive for COVID-19 virus       ED Disposition     ED Disposition Condition Date/Time Comment    Discharge Stable Wed Jul 15, 2020  4:53 PM Amaris Graf discharge to home/self care  Follow-up Information    None         Discharge Medication List as of 7/15/2020  8:38 PM      CONTINUE these medications which have NOT CHANGED    Details   acetaminophen (TYLENOL) 325 mg tablet Take 2 tablets (650 mg total) by mouth every 6 (six) hours as needed for mild pain, headaches or fever, Starting Wed 9/25/2019, No Print      albuterol (2 5 mg/3 mL) 0 083 % nebulizer solution Take 1 vial (2 5 mg total) by nebulization every 6 (six) hours as needed for wheezing or shortness of breath, Starting Wed 9/25/2019, No Print      amiodarone 200 mg tablet Multiple Dosages:Starting Wed 9/25/2019, Last dose on Tue 10/8/2019, THEN Starting Wed 10/9/2019, Last dose on Thu 11/7/2019Take 1 tablet (200 mg total) by mouth 2 (two) times a day for 14 days, THEN 1 tablet (200 mg total) daily  , No Print      amitriptyline (ELAVIL) 25 mg tablet Take 25 mg by mouth daily at bedtime, Historical Med      amLODIPine (NORVASC) 2 5 mg tablet Take 2 5 mg by mouth daily , Starting Tue 2/19/2019, Historical Med      atorvastatin (LIPITOR) 40 mg tablet Take 1 tablet (40 mg total) by mouth every evening, Starting Wed 9/25/2019, No Print      BREO ELLIPTA 100-25 MCG/INH inhaler Starting Thu 2/20/2020, Historical Med      dabigatran etexilate (PRADAXA) 150 mg capsu Take 1 capsule (150 mg total) by mouth every 12 (twelve) hours, Starting Wed 9/25/2019, No Print      lisinopril (ZESTRIL) 5 mg tablet Starting Wed 2/19/2020, Historical Med      Melatonin 5 MG CAPS Take 5 mg by mouth, Historical Med      metoprolol tartrate (LOPRESSOR) 25 mg tablet Take 1 tablet (25 mg total) by mouth 3 (three) times a day, Starting Wed 9/25/2019, No Print      PARoxetine (PAXIL) 10 mg tablet Take 10 mg by mouth daily, Historical Med      polyethylene glycol (MIRALAX) 17 g packet Take 17 g by mouth daily as needed (constipation 1st line), Starting Wed 9/25/2019, No Print      simvastatin (ZOCOR) 10 mg tablet Take 10 mg by mouth daily at bedtime, Historical Med           No discharge procedures on file  PDMP Review     None           ED Provider  Attending physically available and evaluated Karina Sukhwinder NGUYEN managed the patient along with the ED Attending      Electronically Signed by         Leopold Ota, MD  07/16/20 1354